# Patient Record
Sex: FEMALE | Race: WHITE | NOT HISPANIC OR LATINO | Employment: FULL TIME | URBAN - METROPOLITAN AREA
[De-identification: names, ages, dates, MRNs, and addresses within clinical notes are randomized per-mention and may not be internally consistent; named-entity substitution may affect disease eponyms.]

---

## 2017-01-25 ENCOUNTER — OFFICE VISIT (OUTPATIENT)
Dept: PEDIATRICS | Facility: CLINIC | Age: 52
End: 2017-01-25
Payer: COMMERCIAL

## 2017-01-25 ENCOUNTER — TELEPHONE (OUTPATIENT)
Dept: PEDIATRICS | Facility: CLINIC | Age: 52
End: 2017-01-25

## 2017-01-25 VITALS
TEMPERATURE: 98.6 F | WEIGHT: 249.9 LBS | OXYGEN SATURATION: 97 % | HEIGHT: 72 IN | DIASTOLIC BLOOD PRESSURE: 82 MMHG | HEART RATE: 82 BPM | BODY MASS INDEX: 33.85 KG/M2 | SYSTOLIC BLOOD PRESSURE: 110 MMHG

## 2017-01-25 DIAGNOSIS — J20.9 BRONCHITIS WITH BRONCHOSPASM: Primary | ICD-10-CM

## 2017-01-25 PROCEDURE — 99203 OFFICE O/P NEW LOW 30 MIN: CPT | Performed by: INTERNAL MEDICINE

## 2017-01-25 RX ORDER — ALBUTEROL SULFATE 90 UG/1
2 AEROSOL, METERED RESPIRATORY (INHALATION) EVERY 4 HOURS PRN
Qty: 1 INHALER | Refills: 0 | Status: SHIPPED | OUTPATIENT
Start: 2017-01-25 | End: 2017-03-01

## 2017-01-25 RX ORDER — FAMOTIDINE 20 MG
1 TABLET ORAL 2 TIMES DAILY
COMMUNITY

## 2017-01-25 RX ORDER — AMOXICILLIN 500 MG
3 CAPSULE ORAL DAILY
COMMUNITY

## 2017-01-25 NOTE — TELEPHONE ENCOUNTER
Type of outreach:  talked with patient  Health Maintenance Due   Topic Date Due     TETANUS IMMUNIZATION (SYSTEM ASSIGNED)  01/26/1983     HEPATITIS C SCREENING  01/26/1983     PAP SCREENING Q3 YR (SYSTEM ASSIGNED)  01/26/1986     MAMMO SCREEN Q2 YR (SYSTEM ASSIGNED)  01/26/2005     LIPID SCREEN Q5 YR FEMALE (SYSTEM ASSIGNED)  01/26/2010     COLON CANCER SCREEN (SYSTEM ASSIGNED)  01/26/2015     INFLUENZA VACCINE (SYSTEM ASSIGNED)  09/01/2016     Pap and mammo done 12/2015  Will schedule another appt to complete other HM    Ale Nguyen LPN

## 2017-01-25 NOTE — MR AVS SNAPSHOT
"              After Visit Summary   1/25/2017    Alison Chandler    MRN: [id number]           Patient Information     Date Of Birth          1965        Visit Information        Provider Department      1/25/2017 11:10 AM Nia Arshad MD [facility]        Care Instructions    Try mucinex 600-1200 mg twice daily.  You can try the albuterol inhaler 2 puffs every 4 hours if needed, or 4 times a day until you are feeling better.  Let me know by Monday if you aren't doing better and we can do an antibiotic.  Try to be off work until Monday and take it easy.  Keep your fluid intake up.  If at any point you are getting worse, fevers, short of breath, call or come in right away.    We should meet for a medical physical.        Follow-ups after your visit        Who to contact     If you have questions or need follow up information about today's clinic visit or your schedule please contact [facility] directly at [phone].  Normal or non-critical lab and imaging results will be communicated to you by [employer], letter or phone within 4 business days after the clinic has received the results. If you do not hear from us within 7 days, please contact the clinic through [employer] or phone. If you have a critical or abnormal lab result, we will notify you by phone as soon as possible.  Submit refill requests through [employer] or call your pharmacy and they will forward the refill request to us. Please allow 3 business days for your refill to be completed.          Additional Information About Your Visit        [employer] Information     [employer] lets you send messages to your doctor, view your test results, renew your prescriptions, schedule appointments and more. To sign up, go to [email or web address] . Click on \"Log in\" on the left side of the screen, which will take you to the Welcome page. Then click on \"Sign up Now\" on the right side of the page.     You will be asked to enter the " access code listed below, as well as some personal information. Please follow the directions to create your username and password.     Your access code is: [email or web address]  Expires: 2017 12:19 PM     Your access code will  in 90 days. If you need help or a new code, please call your [facility] clinic or [phone].        Care EveryWhere ID     This is your Care EveryWhere ID. This could be used by other organizations to access your [facility] medical records  [id number]        Your Vitals Were     Pulse Temperature Height BMI (Body Mass Index) Pulse Oximetry       82 98.6  F (37  C) (Oral) 6' (1.829 m) 33.89 kg/m2 97%        Blood Pressure from Last 3 Encounters:   17 110/82    Weight from Last 3 Encounters:   17 249 lb 14.4 oz (113.354 kg)              Today, you had the following     No orders found for display       Primary Care Provider    None Specified       No primary provider on file.        Thank you!     Thank you for choosing [facility]  for your care. Our goal is always to provide you with excellent care. Hearing back from our patients is one way we can continue to improve our services. Please take a few minutes to complete the written survey that you may receive in the mail after your visit with us. Thank you!             Your Updated Medication List - Protect others around you: Learn how to safely use, store and throw away your medicines at www.disposemymeds.org.          This list is accurate as of: 17 12:19 PM.  Always use your most recent med list.                   Brand Name Dispense Instructions for use    CALCIUM 1200 PO      Take 1 capsule by mouth daily       Fish Oil 1200 MG Caps      Take 1 capsule by mouth daily       FLUoxetine 20 MG capsule    PROzac     Take 40 mg by mouth daily       MULTIVITAMIN ADULT PO      Take by mouth daily       Vitamin D (Cholecalciferol) 1000 UNITS Caps      Take 1 tablet by mouth 2 times daily

## 2017-01-25 NOTE — PATIENT INSTRUCTIONS
Try mucinex 600-1200 mg twice daily.  You can try the albuterol inhaler 2 puffs every 4 hours if needed, or 4 times a day until you are feeling better.  Let me know by Monday if you aren't doing better and we can do an antibiotic.  Try to be off work until Monday and take it easy.  Keep your fluid intake up.  If at any point you are getting worse, fevers, short of breath, call or come in right away.    We should meet for a medical physical.

## 2017-01-25 NOTE — PROGRESS NOTES
SUBJECTIVE:                                                    Alison Chandler is a 51 year old female who presents to clinic today for the following health issues:      Acute Illness   Acute illness concerns: cough  Onset: 5-6 days     Fever: no    Chills/Sweats: YES    Headache (location?): YES    Sinus Pressure:YES    Conjunctivitis:  no    Ear Pain: YES- pressure    Rhinorrhea: YES    Congestion: YES    Sore Throat: YES     Cough: YES-productive of yellow sputum    Wheeze: YES    Decreased Appetite: no    Nausea: no    Vomiting: no    Diarrhea:  no    Dysuria/Freq.: no    Fatigue/Achiness: YES    Sick/Strep Exposure: no     Therapies Tried and outcome: ibuprofen and sudafed    Is worried about pneumonia. Feels normally she wouldn't come in for a cold, but coughing up lots of phlegm that is yellow. This is unusual for her.     Started 5 days ago as just feeling run down. Cough started 2 days ago and is worsening steadily. Slept 6 hours last night, but coughed a lot this morning.     Problem list and histories reviewed & adjusted, as indicated.  Additional history: as documented    Problem list, Medication list, Allergies, and Medical/Social/Surgical histories reviewed in [facility] and updated as appropriate.    ROS:  Constitutional, HEENT, cardiovascular, pulmonary, GI,  systems are negative, except as otherwise noted.    OBJECTIVE:                                                    /82 mmHg  Pulse 82  Temp(Src) 98.6  F (37  C) (Oral)  Ht 6' (1.829 m)  Wt 249 lb 14.4 oz (113.354 kg)  BMI 33.89 kg/m2  SpO2 97%  Body mass index is 33.89 kg/(m^2).  GENERAL: healthy, alert and no distress  EYES: Eyes grossly normal to inspection, PERRL and conjunctivae and sclerae normal  HENT: ear canals and TM's normal, nose and mouth without ulcers or lesions  NECK: no adenopathy, no asymmetry, masses, or scars and thyroid normal to palpation  RESP: bilateral expiratory wheeze with good air entry.  CV: regular  rate and rhythm, normal S1 S2, no S3 or S4, no murmur, click or rub, no peripheral edema and peripheral pulses strong  ABDOMEN: soft, nontender, no hepatosplenomegaly, no masses and bowel sounds normal    Diagnostic Test Results:  none      ASSESSMENT/PLAN:                                                      1. Bronchitis with bronchospasm  Often gets significant cough with URI. Discussed antibiotic use. At this point, is most likely viral. Plan albuterol prn. See patient instructions section.  - albuterol (PROAIR HFA/PROVENTIL HFA/VENTOLIN HFA) 108 (90 BASE) MCG/ACT Inhaler; Inhale 2 puffs into the lungs every 4 hours as needed for shortness of breath / dyspnea or wheezing  Dispense: 1 Inhaler; Refill: 0    Patient Instructions   Try mucinex 600-1200 mg twice daily.  You can try the albuterol inhaler 2 puffs every 4 hours if needed, or 4 times a day until you are feeling better.  Let me know by Monday if you aren't doing better and we can do an antibiotic.  Try to be off work until Monday and take it easy.  Keep your fluid intake up.  If at any point you are getting worse, fevers, short of breath, call or come in right away.    We should meet for a medical physical.        Nia Condon MD  [facility]

## 2017-01-25 NOTE — NURSING NOTE
Chief Complaint   Patient presents with     URI       Initial /82 mmHg  Pulse 82  Temp(Src) 98.6  F (37  C) (Oral)  Ht 6' (1.829 m)  Wt 249 lb 14.4 oz (113.354 kg)  BMI 33.89 kg/m2  SpO2 97% Estimated body mass index is 33.89 kg/(m^2) as calculated from the following:    Height as of this encounter: 6' (1.829 m).    Weight as of this encounter: 249 lb 14.4 oz (113.354 kg).  BP completed using cuff size: benita Nguyen LPN

## 2017-01-29 ENCOUNTER — MYC MEDICAL ADVICE (OUTPATIENT)
Dept: PEDIATRICS | Facility: CLINIC | Age: 52
End: 2017-01-29

## 2017-02-10 ENCOUNTER — TRANSFERRED RECORDS (OUTPATIENT)
Dept: HEALTH INFORMATION MANAGEMENT | Facility: CLINIC | Age: 52
End: 2017-02-10

## 2017-02-28 NOTE — PROGRESS NOTES
SUBJECTIVE:     CC: Alison Chandler is an 52 year old woman who presents for preventive health visit.     Physical   Annual:     Getting at least 3 servings of Calcium per day::  Yes    Bi-annual eye exam::  Yes    Dental care twice a year::  Yes    Sleep apnea or symptoms of sleep apnea::  None    Diet::  Low fat/cholesterol, Diabetic and Carbohydrate counting    Frequency of exercise::  None    Taking medications regularly::  Yes    Medication side effects::  None, No muscle aches and No significant flushing    Additional concerns today::  YES        Vascular vein issue. Was going to the vein center at [facility]. Has all of her records and would like a referral for further sclerotherapy.     Today's PHQ-2 Score:   PHQ-2 ( 1999 [employer]) 2/24/2017   Q1: Little interest or pleasure in doing things -   Q2: Feeling down, depressed or hopeless -   PHQ-2 Score -   Little interest or pleasure in doing things Not at all   Feeling down, depressed or hopeless Not at all   PHQ-2 Score 0       Abuse: Current or Past(Physical, Sexual or Emotional)- No  Do you feel safe in your environment - Yes    Social History   Substance Use Topics     Smoking status: Never Smoker     Smokeless tobacco: Not on file     Alcohol use Yes     The patient does not drink >3 drinks per day nor >7 drinks per week.    No results for input(s): CHOL, HDL, LDL, TRIG, CHOLHDLRATIO, NHDL in the last 79911 hours.    Reviewed orders with patient.  Reviewed health maintenance and updated orders accordingly - Yes    Mammo Decision Support:  Patient over age 50, mutual decision to screen reflected in health maintenance.    Pertinent mammograms are reviewed under the imaging tab.  History of abnormal Pap smear: NO - age 30- 65 PAP every 3 years recommended  Last 3 Pap Results: No results found for: PAP    Reviewed and updated as needed this visit by clinical staff  Tobacco  Allergies  Meds         Reviewed and updated as needed this visit by  "Provider            ROS:  C: NEGATIVE for fever, chills, change in weight  I: NEGATIVE for worrisome rashes, moles or lesions  E: NEGATIVE for vision changes or irritation  ENT: NEGATIVE for ear, mouth and throat problems  R: NEGATIVE for significant cough or SOB  B: NEGATIVE for masses, tenderness or discharge  CV: NEGATIVE for chest pain, palpitations or peripheral edema  GI: NEGATIVE for nausea, abdominal pain, heartburn, or change in bowel habits  : NEGATIVE for unusual urinary or vaginal symptoms. Periods are regular.  M: NEGATIVE for significant arthralgias or myalgia  N: NEGATIVE for weakness, dizziness or paresthesias  P: NEGATIVE for changes in mood or affect    Problem list, Medication list, Allergies, and Medical/Social/Surgical histories reviewed in [facility] and updated as appropriate.  OBJECTIVE:     /70  Pulse 64  Temp 98.4  F (36.9  C) (Oral)  Ht 5' 11.65\" (1.82 m)  Wt 253 lb 6.4 oz (114.9 kg)  LMP 02/17/2017  BMI 34.7 kg/m2  EXAM:  GENERAL: healthy, alert and no distress  EYES: Eyes grossly normal to inspection, PERRL and conjunctivae and sclerae normal  HENT: ear canals and TM's normal, nose and mouth without ulcers or lesions  NECK: no adenopathy, no asymmetry, masses, or scars and thyroid normal to palpation  RESP: lungs clear to auscultation - no rales, rhonchi or wheezes  BREAST: normal without masses, tenderness or nipple discharge and no palpable axillary masses or adenopathy  CV: regular rate and rhythm, normal S1 S2, no S3 or S4, no murmur, click or rub, no peripheral edema and peripheral pulses strong  ABDOMEN: soft, nontender, no hepatosplenomegaly, no masses and bowel sounds normal  MS: no gross musculoskeletal defects noted, no edema  SKIN: no suspicious lesions or rashes  NEURO: Normal strength and tone, mentation intact and speech normal  PSYCH: mentation appears normal, affect normal/bright    ASSESSMENT/PLAN:     1. Routine general medical examination at a [facility]" "facility      2. Symptomatic varicose veins of both lower extremities  Referral done  - VASCULAR SURGERY REFERRAL    3. Bone spur of foot    - PHYSICAL THERAPY REFERRAL    4. CARDIOVASCULAR SCREENING; LDL GOAL LESS THAN 160    - **Lipid panel reflex to direct LDL FUTURE 2mo  - Comprehensive metabolic panel  - TSH with free T4 reflex    COUNSELING:  Reviewed preventive health counseling, as reflected in patient instructions         reports that she has never smoked. She does not have any smokeless tobacco history on file.    Estimated body mass index is 34.7 kg/(m^2) as calculated from the following:    Height as of this encounter: 5' 11.65\" (1.82 m).    Weight as of this encounter: 253 lb 6.4 oz (114.9 kg).   Weight management plan: Discussed healthy diet and exercise guidelines and patient will follow up in 12 months in clinic to re-evaluate.    Counseling Resources:  ATP IV Guidelines  Pooled Cohorts Equation Calculator  Breast Cancer Risk Calculator  FRAX Risk Assessment  ICSI Preventive Guidelines  Dietary Guidelines for Americans, 2010  USDA's MyPlate  ASA Prophylaxis  Lung CA Screening    Nia Condon MD  [facility]  "

## 2017-03-01 ENCOUNTER — OFFICE VISIT (OUTPATIENT)
Dept: PEDIATRICS | Facility: CLINIC | Age: 52
End: 2017-03-01
Payer: COMMERCIAL

## 2017-03-01 VITALS
SYSTOLIC BLOOD PRESSURE: 100 MMHG | HEART RATE: 64 BPM | TEMPERATURE: 98.4 F | HEIGHT: 72 IN | DIASTOLIC BLOOD PRESSURE: 70 MMHG | WEIGHT: 253.4 LBS | BODY MASS INDEX: 34.32 KG/M2

## 2017-03-01 DIAGNOSIS — I83.893 SYMPTOMATIC VARICOSE VEINS OF BOTH LOWER EXTREMITIES: ICD-10-CM

## 2017-03-01 DIAGNOSIS — Z13.6 CARDIOVASCULAR SCREENING; LDL GOAL LESS THAN 160: ICD-10-CM

## 2017-03-01 DIAGNOSIS — M77.50 BONE SPUR OF FOOT: ICD-10-CM

## 2017-03-01 DIAGNOSIS — Z00.00 ROUTINE GENERAL MEDICAL EXAMINATION AT A HEALTH CARE FACILITY: Primary | ICD-10-CM

## 2017-03-01 LAB
ALBUMIN SERPL-MCNC: 3.9 G/DL (ref 3.4–5)
ALP SERPL-CCNC: 58 U/L (ref 40–150)
ALT SERPL W P-5'-P-CCNC: 36 U/L (ref 0–50)
ANION GAP SERPL CALCULATED.3IONS-SCNC: 6 MMOL/L (ref 3–14)
AST SERPL W P-5'-P-CCNC: 17 U/L (ref 0–45)
BILIRUB SERPL-MCNC: 0.5 MG/DL (ref 0.2–1.3)
BUN SERPL-MCNC: 16 MG/DL (ref 7–30)
CALCIUM SERPL-MCNC: 8.6 MG/DL (ref 8.5–10.1)
CHLORIDE SERPL-SCNC: 106 MMOL/L (ref 94–109)
CHOLEST SERPL-MCNC: 155 MG/DL
CO2 SERPL-SCNC: 30 MMOL/L (ref 20–32)
CREAT SERPL-MCNC: 0.75 MG/DL (ref 0.52–1.04)
GFR SERPL CREATININE-BSD FRML MDRD: 81 ML/MIN/1.7M2
GLUCOSE SERPL-MCNC: 99 MG/DL (ref 70–99)
HDLC SERPL-MCNC: 57 MG/DL
LDLC SERPL CALC-MCNC: 79 MG/DL
NONHDLC SERPL-MCNC: 98 MG/DL
POTASSIUM SERPL-SCNC: 4.1 MMOL/L (ref 3.4–5.3)
PROT SERPL-MCNC: 7.1 G/DL (ref 6.8–8.8)
SODIUM SERPL-SCNC: 142 MMOL/L (ref 133–144)
TRIGL SERPL-MCNC: 94 MG/DL
TSH SERPL DL<=0.005 MIU/L-ACNC: 2 MU/L (ref 0.4–4)

## 2017-03-01 PROCEDURE — 80061 LIPID PANEL: CPT | Performed by: INTERNAL MEDICINE

## 2017-03-01 PROCEDURE — 84443 ASSAY THYROID STIM HORMONE: CPT | Performed by: INTERNAL MEDICINE

## 2017-03-01 PROCEDURE — 99396 PREV VISIT EST AGE 40-64: CPT | Performed by: INTERNAL MEDICINE

## 2017-03-01 PROCEDURE — 80053 COMPREHEN METABOLIC PANEL: CPT | Performed by: INTERNAL MEDICINE

## 2017-03-01 PROCEDURE — 36415 COLL VENOUS BLD VENIPUNCTURE: CPT | Performed by: INTERNAL MEDICINE

## 2017-03-01 NOTE — PATIENT INSTRUCTIONS
Preventive Health Recommendations  Female Ages 50 - 64    Yearly exam: See your health care provider every year in order to  o Review health changes.   o Discuss preventive care.    o Review your medicines if your doctor has prescribed any.      Get a Pap test every three years (unless you have an abnormal result and your provider advises testing more often).    If you get Pap tests with HPV test, you only need to test every 5 years, unless you have an abnormal result.     You do not need a Pap test if your uterus was removed (hysterectomy) and you have not had cancer.    You should be tested each year for STDs (sexually transmitted diseases) if you're at risk.     Have a mammogram every 1 to 2 years.    Have a colonoscopy at age 50, or have a yearly FIT test (stool test). These exams screen for colon cancer.      Have a cholesterol test every 5 years, or more often if advised.    Have a diabetes test (fasting glucose) every three years. If you are at risk for diabetes, you should have this test more often.     If you are at risk for osteoporosis (brittle bone disease), think about having a bone density scan (DEXA).    Shots: Get a flu shot each year. Get a tetanus shot every 10 years.    Nutrition:     Eat at least 5 servings of fruits and vegetables each day.    Eat whole-grain bread, whole-wheat pasta and brown rice instead of white grains and rice.    Talk to your provider about Calcium and Vitamin D.     Lifestyle    Exercise at least 150 minutes a week (30 minutes a day, 5 days a week). This will help you control your weight and prevent disease.    Limit alcohol to one drink per day.    No smoking.     Wear sunscreen to prevent skin cancer.     See your dentist every six months for an exam and cleaning.    See your eye doctor every 1 to 2 years.    Check with your insurance to see where you would like to go.    Put some time into thinking about exercise.  Call [facility] [phone] physical  therapy department to set up an appointment for pool therapy.  Come see me im 3-6 months if you feel like you are not moving forward with weight loss goals.

## 2017-03-01 NOTE — MR AVS SNAPSHOT
After Visit Summary   3/1/2017    Alison Chandler    MRN: [id number]           Patient Information     Date Of Birth          1965        Visit Information        Provider Department      3/1/2017 8:10 AM Nia Arshad MD [facility]        Today's Diagnoses     Routine general medical examination at a health care facility    -  1    Symptomatic varicose veins of both lower extremities        Bone spur of foot        CARDIOVASCULAR SCREENING; LDL GOAL LESS THAN 160          Care Instructions      Preventive Health Recommendations  Female Ages 50 - 64    Yearly exam: See your health care provider every year in order to  o Review health changes.   o Discuss preventive care.    o Review your medicines if your doctor has prescribed any.      Get a Pap test every three years (unless you have an abnormal result and your provider advises testing more often).    If you get Pap tests with HPV test, you only need to test every 5 years, unless you have an abnormal result.     You do not need a Pap test if your uterus was removed (hysterectomy) and you have not had cancer.    You should be tested each year for STDs (sexually transmitted diseases) if you're at risk.     Have a mammogram every 1 to 2 years.    Have a colonoscopy at age 50, or have a yearly FIT test (stool test). These exams screen for colon cancer.      Have a cholesterol test every 5 years, or more often if advised.    Have a diabetes test (fasting glucose) every three years. If you are at risk for diabetes, you should have this test more often.     If you are at risk for osteoporosis (brittle bone disease), think about having a bone density scan (DEXA).    Shots: Get a flu shot each year. Get a tetanus shot every 10 years.    Nutrition:     Eat at least 5 servings of fruits and vegetables each day.    Eat whole-grain bread, whole-wheat pasta and brown rice instead of white grains and rice.    Talk to your provider about  Calcium and Vitamin D.     Lifestyle    Exercise at least 150 minutes a week (30 minutes a day, 5 days a week). This will help you control your weight and prevent disease.    Limit alcohol to one drink per day.    No smoking.     Wear sunscreen to prevent skin cancer.     See your dentist every six months for an exam and cleaning.    See your eye doctor every 1 to 2 years.    Check with your insurance to see where you would like to go.    Put some time into thinking about exercise.  Call [facility] [phone] physical therapy department to set up an appointment for pool therapy.  Come see me im 3-6 months if you feel like you are not moving forward with weight loss goals.          Follow-ups after your visit        Additional Services     PHYSICAL THERAPY REFERRAL       *This therapy referral will be filtered to a centralized scheduling office at [facility] and the patient will receive a call to schedule an appointment at a [place] location most convenient for them. *     [facility] provides Physical Therapy evaluation and treatment and many specialty services across the [place] system.  If requesting a specialty program, please choose from the list below.    If you have not heard from the scheduling office within 2 business days, please call [phone] for all locations, with the exception of [place], please call [phone].  Treatment: Evaluation & Treatment  Special Instructions/Modalities:   Special Programs: Aquatic Therapy ([place], [place] and [place] locations only)    Please be aware that coverage of these services is subject to the terms and limitations of your health insurance plan.  Call member services at your health plan with any benefit or coverage questions.      **Note to Provider:  If you are referring outside of [place] for the therapy appointment, please list the name of the location in the  special instructions  above, print the  referral and give to the patient to schedule the appointment.            VASCULAR SURGERY REFERRAL       Your provider has referred you to: [facility] [place], [place]: [facility] - [place] - [phone] or Vascular  Services - Varicose Veins & None - Please Order Appropriate Testing   [email or web address]    Please be aware that coverage of these services is subject to the terms and limitations of your health insurance plan.  Call member services at your health plan with any benefit or coverage questions.      Please bring the following with you to your appointment:    (1) Any X-Rays, CTs or MRIs which have been performed.  Contact the facility where they were done to arrange for  prior to your scheduled appointment.    (2) List of current medications   (3) This referral request   (4) Any documents/labs given to you for this referral                  Who to contact     If you have questions or need follow up information about today's clinic visit or your schedule please contact [facility] directly at [phone].  Normal or non-critical lab and imaging results will be communicated to you by MyChart, letter or phone within 4 business days after the clinic has received the results. If you do not hear from us within 7 days, please contact the clinic through [address] or phone. If you have a critical or abnormal lab result, we will notify you by phone as soon as possible.  Submit refill requests through [employer] or call your pharmacy and they will forward the refill request to us. Please allow 3 business days for your refill to be completed.          Additional Information About Your Visit        [employer] Information     [employer] gives you secure access to your electronic health record. If you see a primary care provider, you can also send messages to your care team and make appointments. If you have questions, please call your primary care clinic.  If you do not have a primary care  "provider, please call [phone] and they will assist you.        Care EveryWhere ID     This is your Care EveryWhere ID. This could be used by other organizations to access your [place] medical records  [id number]        Your Vitals Were     Pulse Temperature Height Last Period BMI (Body Mass Index)       64 98.4  F (36.9  C) (Oral) 5' 11.65\" (1.82 m) 02/17/2017 34.7 kg/m2        Blood Pressure from Last 3 Encounters:   03/01/17 100/70   01/25/17 110/82    Weight from Last 3 Encounters:   03/01/17 253 lb 6.4 oz (114.9 kg)   01/25/17 249 lb 14.4 oz (113.4 kg)              We Performed the Following     **Lipid panel reflex to direct LDL FUTURE 2mo     Comprehensive metabolic panel     PHYSICAL THERAPY REFERRAL     TSH with free T4 reflex     VASCULAR SURGERY REFERRAL          Today's Medication Changes          These changes are accurate as of: 3/1/17  9:13 AM.  If you have any questions, ask your nurse or doctor.               Stop taking these medicines if you haven't already. Please contact your care team if you have questions.     albuterol 108 (90 BASE) MCG/ACT Inhaler   Commonly known as:  PROAIR HFA/PROVENTIL HFA/VENTOLIN HFA   Stopped by:  Nia Arshad MD                    Primary Care Provider Office Phone # Fax #    Nia Arshad [phone]       [address] [place] [address]        Thank you!     Thank you for choosing [facility]  for your care. Our goal is always to provide you with excellent care. Hearing back from our patients is one way we can continue to improve our services. Please take a few minutes to complete the written survey that you may receive in the mail after your visit with us. Thank you!             Your Updated Medication List - Protect others around you: Learn how to safely use, store and throw away your medicines at www.disposemymeds.org.          This list is accurate as of: 3/1/17  9:13 AM.  Always " use your most recent med list.                   Brand Name Dispense Instructions for use    Fish Oil 1200 MG Caps      Take 1 capsule by mouth daily       FLUoxetine 20 MG capsule    PROzac     Take 40 mg by mouth daily       MULTIVITAMIN ADULT PO      Take by mouth daily       UNABLE TO FIND      MEDICATION NAME: calcium 100 mg daily       Vitamin D (Cholecalciferol) 1000 UNITS Caps      Take 1 tablet by mouth 2 times daily

## 2017-03-01 NOTE — NURSING NOTE
"Chief Complaint   Patient presents with     Physical       Initial /70  Pulse 64  Temp 98.4  F (36.9  C) (Oral)  Ht 5' 11.65\" (1.82 m)  Wt 253 lb 6.4 oz (114.9 kg)  LMP 02/17/2017  BMI 34.7 kg/m2 Estimated body mass index is 34.7 kg/(m^2) as calculated from the following:    Height as of this encounter: 5' 11.65\" (1.82 m).    Weight as of this encounter: 253 lb 6.4 oz (114.9 kg).  Medication Reconciliation: complete   Ale Nguyen LPN    "

## 2017-04-07 ENCOUNTER — APPOINTMENT (OUTPATIENT)
Dept: VASCULAR SURGERY | Facility: CLINIC | Age: 52
End: 2017-04-07
Payer: COMMERCIAL

## 2017-04-07 PROCEDURE — 99204 OFFICE O/P NEW MOD 45 MIN: CPT | Performed by: SURGERY

## 2017-04-21 ENCOUNTER — OFFICE VISIT (OUTPATIENT)
Dept: VASCULAR SURGERY | Facility: CLINIC | Age: 52
End: 2017-04-21
Payer: COMMERCIAL

## 2017-04-21 ENCOUNTER — APPOINTMENT (OUTPATIENT)
Dept: VASCULAR SURGERY | Facility: CLINIC | Age: 52
End: 2017-04-21
Payer: COMMERCIAL

## 2017-04-21 PROCEDURE — 99212 OFFICE O/P EST SF 10 MIN: CPT | Performed by: SURGERY

## 2017-04-21 PROCEDURE — 93970 EXTREMITY STUDY: CPT | Performed by: SURGERY

## 2017-08-07 ENCOUNTER — TELEPHONE (OUTPATIENT)
Dept: PEDIATRICS | Facility: CLINIC | Age: 52
End: 2017-08-07

## 2017-08-07 NOTE — TELEPHONE ENCOUNTER
8/7/2017      Patient state that she has completed Colon & Pap and does not need to do them. She had submit an transfer form to have that info sent over to [place], but it seems like it never came through.             Outreach ,  Mitchel Guerrero

## 2017-09-06 ENCOUNTER — MYC MEDICAL ADVICE (OUTPATIENT)
Dept: PEDIATRICS | Facility: CLINIC | Age: 52
End: 2017-09-06

## 2017-09-06 DIAGNOSIS — L98.9 FACIAL LESION: Primary | ICD-10-CM

## 2017-09-06 NOTE — TELEPHONE ENCOUNTER
Call to patient to see if she has a preference in a dermatologist.  Unable to reach. My-chart sent to patient asking if she has a referral preference.  Ilsa Shultz RN  Message handled by Nurse Triage.      .

## 2017-10-13 ENCOUNTER — OFFICE VISIT (OUTPATIENT)
Dept: DERMATOLOGY | Facility: CLINIC | Age: 52
End: 2017-10-13
Payer: COMMERCIAL

## 2017-10-13 VITALS — HEART RATE: 68 BPM | OXYGEN SATURATION: 97 % | SYSTOLIC BLOOD PRESSURE: 123 MMHG | DIASTOLIC BLOOD PRESSURE: 79 MMHG

## 2017-10-13 DIAGNOSIS — D18.00 ANGIOMA: ICD-10-CM

## 2017-10-13 DIAGNOSIS — D22.9 NEVUS: Primary | ICD-10-CM

## 2017-10-13 DIAGNOSIS — L82.1 SEBORRHEIC KERATOSIS: ICD-10-CM

## 2017-10-13 DIAGNOSIS — L81.4 LENTIGO: ICD-10-CM

## 2017-10-13 PROCEDURE — 99203 OFFICE O/P NEW LOW 30 MIN: CPT | Performed by: PHYSICIAN ASSISTANT
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
## 2017-10-13 NOTE — MR AVS SNAPSHOT
After Visit Summary   10/13/2017    Alison Chandler    MRN: [id number]           Patient Information     Date Of Birth          1965        Visit Information        Provider Department      10/13/2017 8:45 AM Daina Quijano PA-C [facility]        Today's Diagnoses     Nevus    -  1    Lentigo        Angioma        Seborrheic keratosis           Follow-ups after your visit        Who to contact     If you have questions or need follow up information about today's clinic visit or your schedule please contact [facility] directly at [phone].  Normal or non-critical lab and imaging results will be communicated to you by MyChart, letter or phone within 4 business days after the clinic has received the results. If you do not hear from us within 7 days, please contact the clinic through [employer] or phone. If you have a critical or abnormal lab result, we will notify you by phone as soon as possible.  Submit refill requests through [employer] or call your pharmacy and they will forward the refill request to us. Please allow 3 business days for your refill to be completed.          Additional Information About Your Visit        MyChart Information     [employer] gives you secure access to your electronic health record. If you see a primary care provider, you can also send messages to your care team and make appointments. If you have questions, please call your primary care clinic.  If you do not have a primary care provider, please call [phone] and they will assist you.        Care EveryWhere ID     This is your Care EveryWhere ID. This could be used by other organizations to access your [place] medical records  [id number]        Your Vitals Were     Pulse Pulse Oximetry                68 97%           Blood Pressure from Last 3 Encounters:   10/13/17 123/79   03/01/17 100/70   01/25/17 110/82    Weight from Last 3 Encounters:   03/01/17  114.9 kg (253 lb 6.4 oz)   01/25/17 113.4 kg (249 lb 14.4 oz)              Today, you had the following     No orders found for display       Primary Care Provider Office Phone # Fax #    Nia Arshad [phone] [address] [place] [address]        Equal Access to Services     : Hadii aad ku hadasho Soomaali, waaxda luqadaha, qaybta kaalmada adeegyada, waxay idiin hayaan adeeg kharash la'aan . So [facility] [phone].    ATENCIÓN: Si habla español, tiene a frias disposición servicios gratuitos de asistencia lingüística. Llame al [phone].    We comply with applicable federal civil rights laws and [place] laws. We do not discriminate on the basis of race, color, national origin, age, disability, sex, sexual orientation, or gender identity.            Thank you!     Thank you for choosing [facility]  for your care. Our goal is always to provide you with excellent care. Hearing back from our patients is one way we can continue to improve our services. Please take a few minutes to complete the written survey that you may receive in the mail after your visit with us. Thank you!             Your Updated Medication List - Protect others around you: Learn how to safely use, store and throw away your medicines at www.disposemymeds.org.          This list is accurate as of: 10/13/17  9:16 AM.  Always use your most recent med list.                   Brand Name Dispense Instructions for use Diagnosis    fish Oil 1200 MG capsule      Take 1 capsule by mouth daily        FLUoxetine 20 MG capsule    PROzac     Take 40 mg by mouth daily        MULTIVITAMIN ADULT PO      Take by mouth daily        UNABLE TO FIND      MEDICATION NAME: calcium 100 mg daily        Vitamin D (Cholecalciferol) 1000 UNITS Caps      Take 1 tablet by mouth 2 times daily

## 2017-10-13 NOTE — NURSING NOTE
"Chief Complaint   Patient presents with     Skin Check       Initial /79  Pulse 68  SpO2 97% Estimated body mass index is 34.7 kg/(m^2) as calculated from the following:    Height as of 3/1/17: 1.82 m (5' 11.65\").    Weight as of 3/1/17: 114.9 kg (253 lb 6.4 oz).      Patient prefers to be contacted via at Home.   Okay to leave detailed message: Yes  Patient uses MyChart: Yes    Yuliya PHILLIPS LPN    "

## 2018-05-06 ASSESSMENT — ENCOUNTER SYMPTOMS
MYALGIAS: 0
CHILLS: 0
PARESTHESIAS: 0
ARTHRALGIAS: 0
WEAKNESS: 0
HEMATURIA: 0
HEMATOCHEZIA: 0
FEVER: 0
EYE PAIN: 0
HEADACHES: 0
HEARTBURN: 0
CONSTIPATION: 0
NAUSEA: 0
ABDOMINAL PAIN: 0
JOINT SWELLING: 0
DYSURIA: 0
COUGH: 0
NERVOUS/ANXIOUS: 0
DIZZINESS: 0
FREQUENCY: 0
SHORTNESS OF BREATH: 0
SORE THROAT: 0
PALPITATIONS: 0
DIARRHEA: 0

## 2018-05-07 ENCOUNTER — OFFICE VISIT (OUTPATIENT)
Dept: PEDIATRICS | Facility: CLINIC | Age: 53
End: 2018-05-07
Payer: COMMERCIAL

## 2018-05-07 VITALS
OXYGEN SATURATION: 98 % | WEIGHT: 245.9 LBS | BODY MASS INDEX: 34.43 KG/M2 | HEART RATE: 64 BPM | TEMPERATURE: 98.7 F | HEIGHT: 71 IN | SYSTOLIC BLOOD PRESSURE: 118 MMHG | DIASTOLIC BLOOD PRESSURE: 76 MMHG

## 2018-05-07 DIAGNOSIS — Z00.00 ENCOUNTER FOR ROUTINE ADULT HEALTH EXAMINATION WITHOUT ABNORMAL FINDINGS: Primary | ICD-10-CM

## 2018-05-07 DIAGNOSIS — Z11.59 NEED FOR HEPATITIS C SCREENING TEST: ICD-10-CM

## 2018-05-07 DIAGNOSIS — F33.41 RECURRENT MAJOR DEPRESSIVE DISORDER, IN PARTIAL REMISSION (H): ICD-10-CM

## 2018-05-07 PROBLEM — F33.42 RECURRENT MAJOR DEPRESSIVE DISORDER, IN FULL REMISSION (H): Status: ACTIVE | Noted: 2018-05-07

## 2018-05-07 LAB — HBA1C MFR BLD: 5.6 % (ref 0–5.6)

## 2018-05-07 PROCEDURE — 83036 HEMOGLOBIN GLYCOSYLATED A1C: CPT | Performed by: INTERNAL MEDICINE

## 2018-05-07 PROCEDURE — 99396 PREV VISIT EST AGE 40-64: CPT | Performed by: INTERNAL MEDICINE

## 2018-05-07 PROCEDURE — 36415 COLL VENOUS BLD VENIPUNCTURE: CPT | Performed by: INTERNAL MEDICINE

## 2018-05-07 PROCEDURE — 86803 HEPATITIS C AB TEST: CPT | Performed by: INTERNAL MEDICINE

## 2018-05-07 RX ORDER — FLUOXETINE 40 MG/1
40 CAPSULE ORAL DAILY
Qty: 90 CAPSULE | Refills: 1 | Status: SHIPPED | OUTPATIENT
Start: 2018-05-07 | End: 2018-11-06 | Stop reason: DRUGHIGH

## 2018-05-07 ASSESSMENT — ANXIETY QUESTIONNAIRES
7. FEELING AFRAID AS IF SOMETHING AWFUL MIGHT HAPPEN: NOT AT ALL
GAD7 TOTAL SCORE: 0
6. BECOMING EASILY ANNOYED OR IRRITABLE: NOT AT ALL
3. WORRYING TOO MUCH ABOUT DIFFERENT THINGS: NOT AT ALL
1. FEELING NERVOUS, ANXIOUS, OR ON EDGE: NOT AT ALL
IF YOU CHECKED OFF ANY PROBLEMS ON THIS QUESTIONNAIRE, HOW DIFFICULT HAVE THESE PROBLEMS MADE IT FOR YOU TO DO YOUR WORK, TAKE CARE OF THINGS AT HOME, OR GET ALONG WITH OTHER PEOPLE: NOT DIFFICULT AT ALL
2. NOT BEING ABLE TO STOP OR CONTROL WORRYING: NOT AT ALL
5. BEING SO RESTLESS THAT IT IS HARD TO SIT STILL: NOT AT ALL

## 2018-05-07 ASSESSMENT — ENCOUNTER SYMPTOMS
CONSTIPATION: 0
FEVER: 0
PARESTHESIAS: 0
NERVOUS/ANXIOUS: 0
WEAKNESS: 0
SHORTNESS OF BREATH: 0
MYALGIAS: 0
DIARRHEA: 0
ARTHRALGIAS: 0
HEMATURIA: 0
DYSURIA: 0
COUGH: 0
HEADACHES: 0
HEMATOCHEZIA: 0
ABDOMINAL PAIN: 0
EYE PAIN: 0
SORE THROAT: 0
HEARTBURN: 0
DIZZINESS: 0
JOINT SWELLING: 0
FREQUENCY: 0
NAUSEA: 0
PALPITATIONS: 0
CHILLS: 0

## 2018-05-07 ASSESSMENT — PATIENT HEALTH QUESTIONNAIRE - PHQ9: 5. POOR APPETITE OR OVEREATING: NOT AT ALL

## 2018-05-07 NOTE — PROGRESS NOTES
SUBJECTIVE:   CC: Alison Chandler is an 53 year old woman who presents for preventive health visit.     Physical   Annual:     Getting at least 3 servings of Calcium per day::  Yes    Bi-annual eye exam::  Yes    Dental care twice a year::  Yes    Sleep apnea or symptoms of sleep apnea::  None    Diet::  Other    Frequency of exercise::  None    Taking medications regularly::  Yes    Medication side effects::  None    Additional concerns today::  No                Depression Followup    Status since last visit: Stable , on fluoxetine since 2014    See PHQ-9 for current symptoms.  Other associated symptoms: None    Complicating factors:   Significant life event:  No   Current substance abuse:  None  Anxiety or Panic symptoms:  No    No flowsheet data found.  In the past two weeks have you had thoughts of suicide or self-harm?  No.    Do you have concerns about your personal safety or the safety of others?   No  PHQ-9  English  PHQ-9   Any Language  Suicide Assessment Five-step Evaluation and Treatment (SAFE-T)    Today's PHQ-2 Score:   PHQ-2 ( 1999 [employer]) 5/6/2018   Q1: Little interest or pleasure in doing things 0   Q2: Feeling down, depressed or hopeless 0   PHQ-2 Score 0   Q1: Little interest or pleasure in doing things Not at all   Q2: Feeling down, depressed or hopeless Not at all   PHQ-2 Score 0       Abuse: Current or Past(Physical, Sexual or Emotional)- No  Do you feel safe in your environment - Yes    Social History   Substance Use Topics     Smoking status: Never Smoker     Smokeless tobacco: Never Used     Alcohol use Yes     Alcohol Use 5/6/2018   If you drink alcohol do you typically have greater than 3 drinks per day OR greater than 7 drinks per week? No       Reviewed orders with patient.  Reviewed health maintenance and updated orders accordingly - Yes  BP Readings from Last 3 Encounters:   05/07/18 118/76   10/13/17 123/79   03/01/17 100/70    Wt Readings from Last 3 Encounters:   05/07/18  245 lb 14.4 oz (111.5 kg)   03/01/17 253 lb 6.4 oz (114.9 kg)   01/25/17 249 lb 14.4 oz (113.4 kg)                  Patient Active Problem List   Diagnosis     Recurrent major depressive disorder, in partial remission (H)     History reviewed. No pertinent surgical history.    Social History   Substance Use Topics     Smoking status: Never Smoker     Smokeless tobacco: Never Used     Alcohol use Yes     Family History   Problem Relation Age of Onset     DIABETES Mother 50     type 2     Hypertension Mother      Hyperlipidemia Father            Patient over age 50, mutual decision to screen reflected in health maintenance.    Pertinent mammograms are reviewed under the imaging tab.  History of abnormal Pap smear: NO - age 30- 65 PAP every 3 years recommended  Last 3 Pap Results: No results found for: PAP    Reviewed and updated as needed this visit by clinical staff  Tobacco  Allergies  Meds  Med Hx  Surg Hx  Fam Hx  Soc Hx        Reviewed and updated as needed this visit by Provider          Review of Systems   Constitutional: Negative for chills and fever.   HENT: Negative for congestion, ear pain, hearing loss and sore throat.    Eyes: Negative for pain and visual disturbance.   Respiratory: Negative for cough and shortness of breath.    Cardiovascular: Negative for chest pain, palpitations and peripheral edema.   Gastrointestinal: Negative for abdominal pain, constipation, diarrhea, heartburn, hematochezia and nausea.   Genitourinary: Negative for dysuria, frequency, genital sores, hematuria, pelvic pain, urgency, vaginal bleeding and vaginal discharge.   Musculoskeletal: Negative for arthralgias, joint swelling and myalgias.   Skin: Negative for rash.   Neurological: Negative for dizziness, weakness, headaches and paresthesias.   Psychiatric/Behavioral: Negative for mood changes. The patient is not nervous/anxious.         OBJECTIVE:   /76 (Cuff Size: Adult Regular)  Pulse 64  Temp 98.7  F (37.1  " C) (Oral)  Ht 5' 11\" (1.803 m)  Wt 245 lb 14.4 oz (111.5 kg)  LMP 01/18/2018  SpO2 98%  BMI 34.3 kg/m2  Physical Exam  GENERAL: healthy, alert and no distress  EYES: Eyes grossly normal to inspection, PERRL and conjunctivae and sclerae normal  HENT: ear canals and TM's normal, nose and mouth without ulcers or lesions  NECK: no adenopathy, no asymmetry, masses, or scars and thyroid normal to palpation  RESP: lungs clear to auscultation - no rales, rhonchi or wheezes  CV: regular rate and rhythm, normal S1 S2, no S3 or S4, no murmur, click or rub, no peripheral edema and peripheral pulses strong  ABDOMEN: soft, nontender, no hepatosplenomegaly, no masses and bowel sounds normal  MS: no gross musculoskeletal defects noted, no edema  SKIN: no suspicious lesions or rashes  NEURO: Normal strength and tone, mentation intact and speech normal  PSYCH: mentation appears normal, affect normal/bright    ASSESSMENT/PLAN:   1. Encounter for routine adult health examination without abnormal findings  Sees obgyn  - Hemoglobin A1c    2. Recurrent major depressive disorder, in partial remission (H)  Doing well.   - FLUoxetine (PROZAC) 40 MG capsule; Take 1 capsule (40 mg) by mouth daily  Dispense: 90 capsule; Refill: 1    3. Need for hepatitis C screening test    - Hepatitis C Screen Reflex to HCV RNA Quant and Genotype    COUNSELING:  Reviewed preventive health counseling, as reflected in patient instructions         reports that she has never smoked. She has never used smokeless tobacco.    Estimated body mass index is 34.3 kg/(m^2) as calculated from the following:    Height as of this encounter: 5' 11\" (1.803 m).    Weight as of this encounter: 245 lb 14.4 oz (111.5 kg).   Weight management plan: Discussed healthy diet and exercise guidelines and patient will follow up in 12 months in clinic to re-evaluate.    Counseling Resources:  ATP IV Guidelines  Pooled Cohorts Equation Calculator  Breast Cancer Risk Calculator  FRAX " Risk Assessment  ICSI Preventive Guidelines  Dietary Guidelines for Americans, 2010  USDA's MyPlate  ASA Prophylaxis  Lung CA Screening    Nia Arshad MD  [facility]

## 2018-05-07 NOTE — PATIENT INSTRUCTIONS
Preventive Health Recommendations  Female Ages 50 - 64    Yearly exam: See your health care provider every year in order to  o Review health changes.   o Discuss preventive care.    o Review your medicines if your doctor has prescribed any.      Get a Pap test every three years (unless you have an abnormal result and your provider advises testing more often).    If you get Pap tests with HPV test, you only need to test every 5 years, unless you have an abnormal result.     You do not need a Pap test if your uterus was removed (hysterectomy) and you have not had cancer.    You should be tested each year for STDs (sexually transmitted diseases) if you're at risk.     Have a mammogram every 1 to 2 years.    Have a colonoscopy at age 50, or have a yearly FIT test (stool test). These exams screen for colon cancer.      Have a cholesterol test every 5 years, or more often if advised.    Have a diabetes test (fasting glucose) every three years. If you are at risk for diabetes, you should have this test more often.     If you are at risk for osteoporosis (brittle bone disease), think about having a bone density scan (DEXA).    Shots: Get a flu shot each year. Get a tetanus shot every 10 years.    Nutrition:     Eat at least 5 servings of fruits and vegetables each day.    Eat whole-grain bread, whole-wheat pasta and brown rice instead of white grains and rice.    Talk to your provider about Calcium and Vitamin D.     Lifestyle    Exercise at least 150 minutes a week (30 minutes a day, 5 days a week). This will help you control your weight and prevent disease.    Limit alcohol to one drink per day.    No smoking.     Wear sunscreen to prevent skin cancer.     See your dentist every six months for an exam and cleaning.    See your eye doctor every 1 to 2 years.    Be as fit as you can at your current weight.  Try not to gain any more weight.  If you can, try to lose weight.    Don't go to bed at night without knowing what  you are going to do for exercise the next day. (or eat the next day)    If you can't make the best choice, make a better choice.

## 2018-05-08 LAB — HCV AB SERPL QL IA: NONREACTIVE

## 2018-05-08 ASSESSMENT — PATIENT HEALTH QUESTIONNAIRE - PHQ9: SUM OF ALL RESPONSES TO PHQ QUESTIONS 1-9: 1

## 2018-05-08 ASSESSMENT — ANXIETY QUESTIONNAIRES: GAD7 TOTAL SCORE: 0

## 2018-11-06 ENCOUNTER — OFFICE VISIT (OUTPATIENT)
Dept: PEDIATRICS | Facility: CLINIC | Age: 53
End: 2018-11-06
Payer: COMMERCIAL

## 2018-11-06 VITALS
WEIGHT: 253.4 LBS | SYSTOLIC BLOOD PRESSURE: 100 MMHG | HEART RATE: 64 BPM | TEMPERATURE: 98.4 F | BODY MASS INDEX: 35.48 KG/M2 | HEIGHT: 71 IN | OXYGEN SATURATION: 95 % | DIASTOLIC BLOOD PRESSURE: 68 MMHG

## 2018-11-06 DIAGNOSIS — F41.9 ANXIETY: ICD-10-CM

## 2018-11-06 DIAGNOSIS — F33.41 RECURRENT MAJOR DEPRESSIVE DISORDER, IN PARTIAL REMISSION (H): Primary | ICD-10-CM

## 2018-11-06 PROCEDURE — 99214 OFFICE O/P EST MOD 30 MIN: CPT | Performed by: INTERNAL MEDICINE

## 2018-11-06 ASSESSMENT — ANXIETY QUESTIONNAIRES
1. FEELING NERVOUS, ANXIOUS, OR ON EDGE: SEVERAL DAYS
2. NOT BEING ABLE TO STOP OR CONTROL WORRYING: NOT AT ALL
GAD7 TOTAL SCORE: 2
IF YOU CHECKED OFF ANY PROBLEMS ON THIS QUESTIONNAIRE, HOW DIFFICULT HAVE THESE PROBLEMS MADE IT FOR YOU TO DO YOUR WORK, TAKE CARE OF THINGS AT HOME, OR GET ALONG WITH OTHER PEOPLE: NOT DIFFICULT AT ALL
7. FEELING AFRAID AS IF SOMETHING AWFUL MIGHT HAPPEN: NOT AT ALL
5. BEING SO RESTLESS THAT IT IS HARD TO SIT STILL: NOT AT ALL
6. BECOMING EASILY ANNOYED OR IRRITABLE: NOT AT ALL
3. WORRYING TOO MUCH ABOUT DIFFERENT THINGS: NOT AT ALL

## 2018-11-06 ASSESSMENT — PATIENT HEALTH QUESTIONNAIRE - PHQ9
5. POOR APPETITE OR OVEREATING: SEVERAL DAYS
SUM OF ALL RESPONSES TO PHQ QUESTIONS 1-9: 0

## 2018-11-06 NOTE — PROGRESS NOTES
SUBJECTIVE:   Alison Chandler is a 53 year old female who presents to clinic today for the following health issues:      Depression and Anxiety Follow-Up    Status since last visit: Improved with depression, anxiety and panic attacks worsened    Other associated symptoms:None    Complicating factors:     Significant life event: No     Current substance abuse: None    Had a severe anxiety episode in August this year. Not sure where it came from. Was very jealous and anxious and knew there was no reason for it. Since then, notices that anxiety is there. Doesn't have sad or depressed feelings, just feels more wishful thinking that things were different. Job is changing and doesn't have a replacement job yet. Feels like she is starting to lean toward panic attacks.     Last period was February.     PHQ 5/7/2018   PHQ-9 Total Score 1   Q9: Suicide Ideation Not at all     ROBI-7 SCORE 5/7/2018   Total Score 0       PHQ-9  English  PHQ-9   Any Language  ROBI-7  Suicide Assessment Five-step Evaluation and Treatment (SAFE-T)    Amount of exercise or physical activity: None    Problems taking medications regularly: No    Medication side effects: none    Diet: regular (no restrictions)    Problem list and histories reviewed & adjusted, as indicated.  Additional history: as documented    Patient Active Problem List   Diagnosis     Recurrent major depressive disorder, in partial remission (H)     History reviewed. No pertinent surgical history.    Social History   Substance Use Topics     Smoking status: Never Smoker     Smokeless tobacco: Never Used     Alcohol use Yes     Family History   Problem Relation Age of Onset     Diabetes Mother 50     type 2     Hypertension Mother      Hyperlipidemia Father            Reviewed and updated as needed this visit by clinical staff       Reviewed and updated as needed this visit by Provider         ROS:  Constitutional, HEENT, cardiovascular, pulmonary, gi and gu systems are  "negative, except as otherwise noted.    OBJECTIVE:     /68 (Cuff Size: Adult Large)  Pulse 64  Temp 98.4  F (36.9  C) (Oral)  Ht 5' 11\" (1.803 m)  Wt 253 lb 6.4 oz (114.9 kg)  LMP 01/18/2018  SpO2 95%  BMI 35.34 kg/m2  Body mass index is 35.34 kg/(m^2).  GENERAL: healthy, alert and no distress  PSYCH: mentation appears normal, affect normal/bright, judgement and insight intact and appearance well groomed    Diagnostic Test Results:  none     ASSESSMENT/PLAN:     1. Recurrent major depressive disorder, in partial remission (H)  Depression overall in good control, but increased anxiety symptoms.  - FLUoxetine (PROZAC) 20 MG capsule; Take 3 capsules (60 mg) by mouth daily  Dispense: 90 capsule; Refill: 1    2. 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 Discussed options. At this point, will increase fluoxetine and add therapy. Encouraged exercise. Discussed option to do prn propranolol or other, but she declines.  - MENTAL HEALTH REFERRAL  - Adult; Outpatient Treatment; Individual/Couples/Family/Group Therapy/Health Psychology; [facility]: [facility] [phone]; We will contact you to schedule the appointment or please call with any questions    Patient Instructions   Consider seeing Jerry Myles, a therapist at our clinic. You can call for an appointment - [phone].      Jv Mandel PsyD  [facility]  [address] [place] [id number] [phone]    Increase fluoxetine to 60 mg daily.    Look into other types of exercise you might like.    I spent 25 minutes with this patient face-to-face, of which 50% or greater was spent in counseling and coordination of care with regards to anxiety and depression. Please see plan above.    Nia Arshad MD  [facility]  "

## 2018-11-06 NOTE — LETTER
My Depression Action Plan  Name: Alison Chandler   Date of Birth 1965  Date: 11/6/2018    My doctor: Nia Arshad   My clinic: [address]  [address]  [facility] [address] [phone]          GREEN    ZONE   Good Control    What it looks like:     Things are going generally well. You have normal up s and down s. You may even feel depressed from time to time, but bad moods usually last less than a day.   What you need to do:  1. Continue to care for yourself (see self care plan)  2. Check your depression survival kit and update it as needed  3. Follow your physician s recommendations including any medication.  4. Do not stop taking medication unless you consult with your physician first.           YELLOW         ZONE Getting Worse    What it looks like:     Depression is starting to interfere with your life.     It may be hard to get out of bed; you may be starting to isolate yourself from others.    Symptoms of depression are starting to last most all day and this has happened for several days.     You may have suicidal thoughts but they are not constant.   What you need to do:     1. Call your care team, your response to treatment will improve if you keep your care team informed of your progress. Yellow periods are signs an adjustment may need to be made.     2. Continue your self-care, even if you have to fake it!    3. Talk to someone in your support network    4. Open up your depression survival kit           RED    ZONE Medical Alert - Get Help    What it looks like:     Depression is seriously interfering with your life.     You may experience these or other symptoms: You can t get out of bed most days, can t work or engage in other necessary activities, you have trouble taking care of basic hygiene, or basic responsibilities, thoughts of suicide or death that will not go away, self-injurious behavior.     What you need to do:  1. Call  your care team and request a same-day appointment. If they are not available (weekends or after hours) call your local crisis line, emergency room or 911.            Depression Self Care Plan / Survival Kit    Self-Care for Depression  Here s the deal. Your body and mind are really not as separate as most people think.  What you do and think affects how you feel and how you feel influences what you do and think. This means if you do things that people who feel good do, it will help you feel better.  Sometimes this is all it takes.  There is also a place for medication and therapy depending on how severe your depression is, so be sure to consult with your medical provider and/ or Behavioral Health Consultant if your symptoms are worsening or not improving.     In order to better manage my stress, I will:    Exercise  Get some form of exercise, every day. This will help reduce pain and release endorphins, the  feel good  chemicals in your brain. This is almost as good as taking antidepressants!  This is not the same as joining a gym and then never going! (they count on that by the way ) It can be as simple as just going for a walk or doing some gardening, anything that will get you moving.      Hygiene   Maintain good hygiene (Get out of bed in the morning, Make your bed, Brush your teeth, Take a shower, and Get dressed like you were going to work, even if you are unemployed).  If your clothes don't fit try to get ones that do.    Diet  I will strive to eat foods that are good for me, drink plenty of water, and avoid excessive sugar, caffeine, alcohol, and other mood-altering substances.  Some foods that are helpful in depression are: complex carbohydrates, B vitamins, flaxseed, fish or fish oil, fresh fruits and vegetables.    Psychotherapy  I agree to participate in Individual Therapy (if recommended).    Medication  If prescribed medications, I agree to take them.  Missing doses can result in serious side effects.   I understand that drinking alcohol, or other illicit drug use, may cause potential side effects.  I will not stop my medication abruptly without first discussing it with my provider.    Staying Connected With Others  I will stay in touch with my friends, family members, and my primary care provider/team.    Use your imagination  Be creative.  We all have a creative side; it doesn t matter if it s oil painting, sand castles, or mud pies! This will also kick up the endorphins.    Witness Beauty  (AKA stop and smell the roses) Take a look outside, even in mid-winter. Notice colors, textures. Watch the squirrels and birds.     Service to others  Be of service to others.  There is always someone else in need.  By helping others we can  get out of ourselves  and remember the really important things.  This also provides opportunities for practicing all the other parts of the program.    Humor  Laugh and be silly!  Adjust your TV habits for less news and crime-drama and more comedy.    Control your stress  Try breathing deep, massage therapy, biofeedback, and meditation. Find time to relax each day.     My support system    Clinic Contact:  Phone number:    Contact 1:  Phone number:    Contact 2:  Phone number:    Faith/:  Phone number:    Therapist:  Phone number:    [facility] crisis center:    Phone number:    Other community support:  Phone number:

## 2018-11-06 NOTE — MR AVS SNAPSHOT
After Visit Summary   11/6/2018    Alison Chandler    MRN: [id number]           Patient Information     Date Of Birth          1965        Visit Information        Provider Department      11/6/2018 8:30 AM [facility]ia [facility]ad [facility]        Today's Diagnoses     Recurrent major depressive disorder, in partial remission (H)    -  1    Anxiety          Care Instructions    Consider seeing Jerry Myles, a therapist at our clinic. You can call for an appointment - [phone].      Jv Mandel PsyD  [facility]  [address] [place] [address] [address]  [place] [address]  [phone]    Increase fluoxetine to 60 mg daily.    Look into other types of exercise you might like.          Follow-ups after your visit        Additional Services     MENTAL HEALTH REFERRAL  - Adult; Outpatient Treatment; Individual/Couples/Family/Group Therapy/Health Psychology; [facility]: [facility] [phone]; We will contact you to schedule the appointment or please call with any questions       All scheduling is subject to the client's specific insurance plan & benefits, provider/location availability, and provider clinical specialities.  Please arrive 15 minutes early for your first appointment and bring your completed paperwork.    Please be aware that coverage of these services is subject to the terms and limitations of your health insurance plan.  Call member services at your health plan with any benefit or coverage questions.                            Who to contact     If you have questions or need follow up information about today's clinic visit or your schedule please contact [facility] directly at [phone].  Normal or non-critical lab and imaging results will be communicated to you by MyChart, letter or phone within 4 business days after the clinic has received the results. If you do not hear from us within 7 days, please  "contact the clinic through [employer] or phone. If you have a critical or abnormal lab result, we will notify you by phone as soon as possible.  Submit refill requests through [employer] or call your pharmacy and they will forward the refill request to us. Please allow 3 business days for your refill to be completed.          Additional Information About Your Visit        [employer] Information     [employer] gives you secure access to your electronic health record. If you see a primary care provider, you can also send messages to your care team and make appointments. If you have questions, please call your primary care clinic.  If you do not have a primary care provider, please call [phone] and they will assist you.        Care EveryWhere ID     This is your Care EveryWhere ID. This could be used by other organizations to access your [place] medical records  [id number]        Your Vitals Were     Pulse Temperature Height Last Period Pulse Oximetry BMI (Body Mass Index)    64 98.4  F (36.9  C) (Oral) 5' 11\" (1.803 m) 01/18/2018 95% 35.34 kg/m2       Blood Pressure from Last 3 Encounters:   11/06/18 100/68   05/07/18 118/76   10/13/17 123/79    Weight from Last 3 Encounters:   11/06/18 253 lb 6.4 oz (114.9 kg)   05/07/18 245 lb 14.4 oz (111.5 kg)   03/01/17 253 lb 6.4 oz (114.9 kg)              We Performed the Following     MENTAL HEALTH REFERRAL  - Adult; Outpatient Treatment; Individual/Couples/Family/Group Therapy/Health Psychology; [facility]: [facility] [phone]; We will contact you to schedule the appointment or please call with any questions          Today's Medication Changes          These changes are accurate as of 11/6/18  9:35 AM.  If you have any questions, ask your nurse or doctor.               These medicines have changed or have updated prescriptions.        Dose/Directions    * FLUoxetine 40 MG capsule   Commonly known as:  PROzac   This may have changed:  Another medication with the " same name was added. Make sure you understand how and when to take each.   Used for:  Recurrent major depressive disorder, in partial remission (H)   Changed by:  Nia Arshad MD        Dose:  40 mg   Take 1 capsule (40 mg) by mouth daily   Quantity:  90 capsule   Refills:  1       * FLUoxetine 20 MG capsule   Commonly known as:  PROzac   This may have changed:  You were already taking a medication with the same name, and this prescription was added. Make sure you understand how and when to take each.   Used for:  Recurrent major depressive disorder, in partial remission (H)   Changed by:  Nia Arshad MD        Dose:  60 mg   Take 3 capsules (60 mg) by mouth daily   Quantity:  90 capsule   Refills:  1       * Notice:  This list has 2 medication(s) that are the same as other medications prescribed for you. Read the directions carefully, and ask your doctor or other care provider to review them with you.         Where to get your medicines      These medications were sent to [facility] [address] [place] [address] [address] [place] [id number] [facility] [place] [address]     Phone:  [phone]     FLUoxetine 20 MG capsule                Primary Care Provider Office Phone # Fax #    Nia Arshad [phone] [address] [place] [address]        Equal Access to Services     [facility] JARETT : Hadii neha ku hadasho Soomaali, waaxda luqadaha, qaybta kaalmada adeegyada, marcella tuttle. So [facility] [phone].    ATENCIÓN: Si habla español, tiene a frias disposición servicios gratuitos de asistencia lingüística. Llame al [phone].    We comply with applicable federal civil rights laws and [place] laws. We do not discriminate on the basis of race, color, national origin, age, disability, sex, sexual orientation, or gender identity.            Thank you!     Thank you for choosing [facility]  for your care. Our goal is always to provide you with excellent care. Hearing back from our patients is one way we can continue to improve our services. Please take a few minutes to complete the written survey that you may receive in the mail after your visit with us. Thank you!             Your Updated Medication List - Protect others around you: Learn how to safely use, store and throw away your medicines at www.disposemymeds.org.          This list is accurate as of 11/6/18  9:35 AM.  Always use your most recent med list.                   Brand Name Dispense Instructions for use Diagnosis    fish Oil 1200 MG capsule      Take 1 capsule by mouth daily        * FLUoxetine 40 MG capsule    PROzac    90 capsule    Take 1 capsule (40 mg) by mouth daily    Recurrent major depressive disorder, in partial remission (H)       * FLUoxetine 20 MG capsule    PROzac    90 capsule    Take 3 capsules (60 mg) by mouth daily    Recurrent major depressive disorder, in partial remission (H)       MULTIVITAMIN ADULT PO      Take by mouth daily        UNABLE TO FIND      MEDICATION NAME: calcium 100 mg daily        Vitamin D (Cholecalciferol) 1000 units Caps      Take 1 tablet by mouth 2 times daily        * Notice:  This list has 2 medication(s) that are the same as other medications prescribed for you. Read the directions carefully, and ask your doctor or other care provider to review them with you.

## 2018-11-07 ASSESSMENT — ANXIETY QUESTIONNAIRES: GAD7 TOTAL SCORE: 2

## 2018-11-15 PROBLEM — F41.9 ANXIETY: Status: ACTIVE | Noted: 2018-11-15

## 2018-12-10 ENCOUNTER — VIRTUAL VISIT (OUTPATIENT)
Dept: PEDIATRICS | Facility: CLINIC | Age: 53
End: 2018-12-10
Payer: COMMERCIAL

## 2018-12-10 DIAGNOSIS — F33.41 RECURRENT MAJOR DEPRESSIVE DISORDER, IN PARTIAL REMISSION (H): Primary | ICD-10-CM

## 2018-12-10 PROCEDURE — 99441 ZZC PHYSICIAN TELEPHONE EVALUATION 5-10 MIN: CPT | Performed by: INTERNAL MEDICINE

## 2018-12-10 RX ORDER — FLUOXETINE 40 MG/1
40 CAPSULE ORAL DAILY
Qty: 90 CAPSULE | Refills: 1 | Status: SHIPPED | OUTPATIENT
Start: 2018-12-10 | End: 2019-05-28 | Stop reason: DRUGHIGH

## 2018-12-10 ASSESSMENT — PATIENT HEALTH QUESTIONNAIRE - PHQ9: SUM OF ALL RESPONSES TO PHQ QUESTIONS 1-9: 0

## 2018-12-10 NOTE — PROGRESS NOTES
"Alison Chandler is a 53 year old female who is being evaluated via a telephone visit.      The patient has been notified of following (by ROSE Nguyen LPN       \"We have found that certain health care needs can be provided without the need for a physical exam.  This service lets us provide the care you need with a short phone conversation.  If a prescription is necessary we can send it directly to your pharmacy.  If lab work is needed we can place an order for that and you can then stop by our lab to have the test done at a later time.    This telephone visit will be conducted via 3 way call with the you (the patient) , the physician/provider, and a me all on the line at the same time.  This allows your physician/provider to have the phone conversation with you while I will be taking notes for your medical record.  We will have full access to your [place] medical record during this entire phone call.    Since this is like an office visit,  will bill your insurance company for this service.  Please check with your medical insurance if this type of telephone/virtual is covered . You may be responsible for the cost of this service if insurance coverage is denied.  The typical cost is $30 (10min), $59(11-20min) and $85 (21-30min)     If during the course of the call the physician/provider feels a telephone visit is not appropriate, you will not be charged for this service\"    Consent has been obtained for this service by care team member: yes.  See the scanned image in the medical record.    S: The history as provided by the patient to the provider during this 3 way call include:    Feels well. No symptoms of depression despite her contract job ending. New contract won't start until January. Has been exercising doing paddleboard yoga at the [facility] and loves it. Wondering about decreasing her dose to 40 mg on fluoxetine.    Pertinent parts of the the patient's medical history reviewed " and confirmed by the provider included :      Total time of call between patient and provider was 9 minutes     PHQ-9 SCORE 5/7/2018 11/6/2018 12/10/2018   PHQ-9 Total Score 1 0 0         Nia Arshad M.D. (MD signature)  ===================================================    I have reviewed the note as documented above.  This accurately captures the substance of my conversation with the patient,    Additional provider notes:    Assessment/Plan:  1. Recurrent major depressive disorder, in partial remission (H)  Doing well. Plan decrease fluoxetine. She will let me know if symptoms worsen.   - FLUoxetine (PROZAC) 40 MG capsule; Take 1 capsule (40 mg) by mouth daily  Dispense: 90 capsule; Refill: 1

## 2019-01-16 DIAGNOSIS — Z12.31 ENCOUNTER FOR SCREENING MAMMOGRAM FOR BREAST CANCER: Primary | ICD-10-CM

## 2019-01-18 ENCOUNTER — OFFICE VISIT (OUTPATIENT)
Dept: PSYCHOLOGY | Facility: CLINIC | Age: 54
End: 2019-01-18
Payer: COMMERCIAL

## 2019-01-18 DIAGNOSIS — F43.23 ADJUSTMENT DISORDER WITH MIXED ANXIETY AND DEPRESSED MOOD: Primary | ICD-10-CM

## 2019-01-18 PROCEDURE — 90791 PSYCH DIAGNOSTIC EVALUATION: CPT | Performed by: SOCIAL WORKER

## 2019-01-18 ASSESSMENT — ANXIETY QUESTIONNAIRES
6. BECOMING EASILY ANNOYED OR IRRITABLE: NOT AT ALL
3. WORRYING TOO MUCH ABOUT DIFFERENT THINGS: SEVERAL DAYS
IF YOU CHECKED OFF ANY PROBLEMS ON THIS QUESTIONNAIRE, HOW DIFFICULT HAVE THESE PROBLEMS MADE IT FOR YOU TO DO YOUR WORK, TAKE CARE OF THINGS AT HOME, OR GET ALONG WITH OTHER PEOPLE: NOT DIFFICULT AT ALL
1. FEELING NERVOUS, ANXIOUS, OR ON EDGE: SEVERAL DAYS
2. NOT BEING ABLE TO STOP OR CONTROL WORRYING: NOT AT ALL
5. BEING SO RESTLESS THAT IT IS HARD TO SIT STILL: NOT AT ALL
7. FEELING AFRAID AS IF SOMETHING AWFUL MIGHT HAPPEN: NOT AT ALL
GAD7 TOTAL SCORE: 2

## 2019-01-18 ASSESSMENT — PATIENT HEALTH QUESTIONNAIRE - PHQ9
5. POOR APPETITE OR OVEREATING: NOT AT ALL
SUM OF ALL RESPONSES TO PHQ QUESTIONS 1-9: 3
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
## 2019-01-19 ASSESSMENT — ANXIETY QUESTIONNAIRES: GAD7 TOTAL SCORE: 2

## 2019-01-21 ENCOUNTER — ANCILLARY PROCEDURE (OUTPATIENT)
Dept: MAMMOGRAPHY | Facility: CLINIC | Age: 54
End: 2019-01-21
Payer: COMMERCIAL

## 2019-01-21 DIAGNOSIS — Z12.31 ENCOUNTER FOR SCREENING MAMMOGRAM FOR BREAST CANCER: ICD-10-CM

## 2019-01-21 PROCEDURE — 77067 SCR MAMMO BI INCL CAD: CPT | Mod: TC

## 2019-01-29 ENCOUNTER — OFFICE VISIT (OUTPATIENT)
Dept: PSYCHOLOGY | Facility: CLINIC | Age: 54
End: 2019-01-29
Payer: COMMERCIAL

## 2019-01-29 DIAGNOSIS — F43.23 ADJUSTMENT DISORDER WITH MIXED ANXIETY AND DEPRESSED MOOD: Primary | ICD-10-CM

## 2019-01-29 PROCEDURE — 90834 PSYTX W PT 45 MINUTES: CPT | Performed by: SOCIAL WORKER

## 2019-01-29 ASSESSMENT — ANXIETY QUESTIONNAIRES
GAD7 TOTAL SCORE: 0
2. NOT BEING ABLE TO STOP OR CONTROL WORRYING: NOT AT ALL
1. FEELING NERVOUS, ANXIOUS, OR ON EDGE: NOT AT ALL
7. FEELING AFRAID AS IF SOMETHING AWFUL MIGHT HAPPEN: NOT AT ALL
6. BECOMING EASILY ANNOYED OR IRRITABLE: NOT AT ALL
5. BEING SO RESTLESS THAT IT IS HARD TO SIT STILL: NOT AT ALL
IF YOU CHECKED OFF ANY PROBLEMS ON THIS QUESTIONNAIRE, HOW DIFFICULT HAVE THESE PROBLEMS MADE IT FOR YOU TO DO YOUR WORK, TAKE CARE OF THINGS AT HOME, OR GET ALONG WITH OTHER PEOPLE: NOT DIFFICULT AT ALL
3. WORRYING TOO MUCH ABOUT DIFFERENT THINGS: NOT AT ALL

## 2019-01-29 ASSESSMENT — PATIENT HEALTH QUESTIONNAIRE - PHQ9
SUM OF ALL RESPONSES TO PHQ QUESTIONS 1-9: 0
5. POOR APPETITE OR OVEREATING: NOT AT ALL

## 2019-01-29 NOTE — PROGRESS NOTES
"                                           Progress Note    Client Name: Alison Chandler  Date: 1/29/19         Service Type: Individual     Session Start Time: 8:00am Session End Time: 8:50am      Session Length: 50 minutes     Session #: 2     Attendees: Client attended alone    Treatment Plan Last Reviewed: Not completed  PHQ-9 / ROBI-7 : Reviewed, pt's score on both assessments decreased     DATA      Progress Since Last Session (Related to Symptoms / Goals / Homework):   Symptoms: Improving Pt states she has been engaging in more tasks and responsibilities over the past week. She also enjoyed herself at several family gatherings.    Homework: homework was not assigned last session      Episode of Care Goals: Minimal progress - ACTION (Actively working towards change); Intervened by reinforcing change plan / affirming steps taken     Current / Ongoing Stressors and Concerns: Pt reports reflecting over the past week how important trust is to her in relationships. She values the amount of trust and openess she has with her son and acknowledges that she did not have the level of trust with her father. She continues to educate herself on her son's polyamorous lifestyle and wants to be open and accepting of the \"new generation.\" She worries about her son's emotional safety as he has been bullied in the past. She shared that she had [place] in [place] which informs her life outlook, including often questioning others intentions. PTSD was ruled out as a diagnosis. We also discussed a period in the client's life where her son did not communicate with her for one year. She explained that she in despair and was grieving the loss of not talking with her son.      Treatment Objective(s) Addressed in This Session:   Participate in 3 activities to improve mood. Pt was able to identify several activities to improve her mood including engaging in tasks related to her.     Intervention:   CBT: Discussed pt's " "core belief of \"I'm a failure.\" Pt states that she \"evaluates everything she did wrong as a parent.\" Helped the pt assess what she did right as a parent. Pt states that as a child, her father rarely complimented her of her achievements and instead pointed out her flaws. Pt discussed an incident last week where her son made an almost perfect meal. Instead of enjoying the meal, she was distracted with her urge to tell her son what was wrong with the meal. She mentioned that her son does not know the reason she is in therapy and she believes he may think it is in part because of him. In session, she developed a self compassionate statement that she would tell him if he asks. She will say, \"therapy is to help me accept myself for who I am.\" She reports it is difficultly for her to accept compliments about herself but \"she is getting better at it.\"         ASSESSMENT: Current Emotional / Mental Status (status of significant symptoms):   Risk status (Self / Other harm or suicidal ideation)   Client denies current fears or concerns for personal safety.   Client denies current or recent suicidal ideation or behaviors.   Client denies current or recent homicidal ideation or behaviors.   Client denies current or recent self injurious behavior or ideation.   Client denies other safety concerns.   Client Client reports there has been no change in risk factors since their last session.     Client Client reports there has been no change in protective factors since their last session.     A safety and risk management plan has not been developed at this time, however client was given the after-hours number / 911 should there be a change in any of these risk factors.     Appearance:   Appropriate    Eye Contact:   Good    Psychomotor Behavior: Normal    Attitude:   Cooperative    Orientation:   All   Speech    Rate / Production: Normal     Volume:  Normal    Mood:    Normal   Affect:    Appropriate  Worrisome    Thought " Content:  Clear    Thought Form:  Coherent  Logical    Insight:    Fair      Medication Review:   No changes to current psychiatric medication(s)     Medication Compliance:   Yes     Changes in Health Issues:   None reported     Chemical Use Review:   Substance Use: Chemical use reviewed, no active concerns identified      Tobacco Use: No current tobacco use.       Collateral Reports Completed:   Not Applicable    PLAN: (Client Tasks / Therapist Tasks / Other) Pt will start to identify her perspective as herself and her perspective as a parent.                            ANTONI Quinones, LGSW  January 29, 2019  Note reviewed and clinical supervision by ANTONI Acosta [facility] 2/14/2019

## 2019-01-30 ASSESSMENT — ANXIETY QUESTIONNAIRES: GAD7 TOTAL SCORE: 0

## 2019-02-15 ENCOUNTER — OFFICE VISIT (OUTPATIENT)
Dept: PSYCHOLOGY | Facility: CLINIC | Age: 54
End: 2019-02-15
Payer: COMMERCIAL

## 2019-02-15 DIAGNOSIS — F43.23 ADJUSTMENT DISORDER WITH MIXED ANXIETY AND DEPRESSED MOOD: Primary | ICD-10-CM

## 2019-02-15 PROCEDURE — 90834 PSYTX W PT 45 MINUTES: CPT | Performed by: SOCIAL WORKER

## 2019-02-15 ASSESSMENT — PATIENT HEALTH QUESTIONNAIRE - PHQ9
5. POOR APPETITE OR OVEREATING: NOT AT ALL
SUM OF ALL RESPONSES TO PHQ QUESTIONS 1-9: 0

## 2019-02-15 ASSESSMENT — ANXIETY QUESTIONNAIRES
1. FEELING NERVOUS, ANXIOUS, OR ON EDGE: NOT AT ALL
2. NOT BEING ABLE TO STOP OR CONTROL WORRYING: NOT AT ALL
5. BEING SO RESTLESS THAT IT IS HARD TO SIT STILL: NOT AT ALL
GAD7 TOTAL SCORE: 0
3. WORRYING TOO MUCH ABOUT DIFFERENT THINGS: NOT AT ALL
7. FEELING AFRAID AS IF SOMETHING AWFUL MIGHT HAPPEN: NOT AT ALL
6. BECOMING EASILY ANNOYED OR IRRITABLE: NOT AT ALL

## 2019-02-15 NOTE — Clinical Note
Jose J afternoon Zhane,Can you please review and suggestion changes to this note and treatment plan? Thanks!Bradley
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
## 2019-02-16 ASSESSMENT — ANXIETY QUESTIONNAIRES: GAD7 TOTAL SCORE: 0

## 2019-02-22 ENCOUNTER — OFFICE VISIT (OUTPATIENT)
Dept: PSYCHOLOGY | Facility: CLINIC | Age: 54
End: 2019-02-22
Payer: COMMERCIAL

## 2019-02-22 DIAGNOSIS — F43.23 ADJUSTMENT DISORDER WITH MIXED ANXIETY AND DEPRESSED MOOD: Primary | ICD-10-CM

## 2019-02-22 PROCEDURE — 90834 PSYTX W PT 45 MINUTES: CPT | Performed by: SOCIAL WORKER
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
## 2019-02-22 NOTE — Clinical Note
Good morning Zhane,Can you please review and recommend changes/ suggestions to this note. Thanks!Bradley

## 2019-02-24 ASSESSMENT — ANXIETY QUESTIONNAIRES
1. FEELING NERVOUS, ANXIOUS, OR ON EDGE: NOT AT ALL
2. NOT BEING ABLE TO STOP OR CONTROL WORRYING: NOT AT ALL
3. WORRYING TOO MUCH ABOUT DIFFERENT THINGS: NOT AT ALL
6. BECOMING EASILY ANNOYED OR IRRITABLE: NOT AT ALL
7. FEELING AFRAID AS IF SOMETHING AWFUL MIGHT HAPPEN: NOT AT ALL
GAD7 TOTAL SCORE: 0
5. BEING SO RESTLESS THAT IT IS HARD TO SIT STILL: NOT AT ALL

## 2019-02-24 ASSESSMENT — PATIENT HEALTH QUESTIONNAIRE - PHQ9
5. POOR APPETITE OR OVEREATING: NOT AT ALL
SUM OF ALL RESPONSES TO PHQ QUESTIONS 1-9: 1

## 2019-02-25 ASSESSMENT — ANXIETY QUESTIONNAIRES: GAD7 TOTAL SCORE: 0

## 2019-03-04 ENCOUNTER — OFFICE VISIT (OUTPATIENT)
Dept: PSYCHOLOGY | Facility: CLINIC | Age: 54
End: 2019-03-04
Payer: COMMERCIAL

## 2019-03-04 DIAGNOSIS — F43.23 ADJUSTMENT DISORDER WITH MIXED ANXIETY AND DEPRESSED MOOD: Primary | ICD-10-CM

## 2019-03-04 PROCEDURE — 90832 PSYTX W PT 30 MINUTES: CPT | Performed by: SOCIAL WORKER
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
## 2019-05-26 ASSESSMENT — ENCOUNTER SYMPTOMS
HEMATURIA: 0
JOINT SWELLING: 0
HEARTBURN: 0
CHILLS: 0
BREAST MASS: 0
DIZZINESS: 0
FEVER: 0
PARESTHESIAS: 0
WEAKNESS: 0
SHORTNESS OF BREATH: 0
NERVOUS/ANXIOUS: 0
HEADACHES: 0
DIARRHEA: 0
SORE THROAT: 0
PALPITATIONS: 0
DYSURIA: 0
CONSTIPATION: 0
ARTHRALGIAS: 0
ABDOMINAL PAIN: 0
NAUSEA: 0
EYE PAIN: 0
MYALGIAS: 0
COUGH: 0
FREQUENCY: 0
HEMATOCHEZIA: 0

## 2019-05-28 ENCOUNTER — OFFICE VISIT (OUTPATIENT)
Dept: PEDIATRICS | Facility: CLINIC | Age: 54
End: 2019-05-28
Payer: COMMERCIAL

## 2019-05-28 VITALS
HEART RATE: 59 BPM | BODY MASS INDEX: 34.8 KG/M2 | TEMPERATURE: 97.6 F | DIASTOLIC BLOOD PRESSURE: 74 MMHG | SYSTOLIC BLOOD PRESSURE: 114 MMHG | HEIGHT: 71 IN | WEIGHT: 248.6 LBS | OXYGEN SATURATION: 98 %

## 2019-05-28 DIAGNOSIS — Z12.4 CERVICAL CANCER SCREENING: ICD-10-CM

## 2019-05-28 DIAGNOSIS — Z13.6 CARDIOVASCULAR SCREENING; LDL GOAL LESS THAN 160: ICD-10-CM

## 2019-05-28 DIAGNOSIS — F33.41 RECURRENT MAJOR DEPRESSIVE DISORDER, IN PARTIAL REMISSION (H): ICD-10-CM

## 2019-05-28 DIAGNOSIS — Z00.00 ROUTINE GENERAL MEDICAL EXAMINATION AT A HEALTH CARE FACILITY: Primary | ICD-10-CM

## 2019-05-28 LAB
CHOLEST SERPL-MCNC: 153 MG/DL
GLUCOSE SERPL-MCNC: 97 MG/DL (ref 70–99)
HBA1C MFR BLD: 5.6 % (ref 0–5.6)
HDLC SERPL-MCNC: 61 MG/DL
LDLC SERPL CALC-MCNC: 72 MG/DL
NONHDLC SERPL-MCNC: 92 MG/DL
TRIGL SERPL-MCNC: 98 MG/DL

## 2019-05-28 PROCEDURE — 82947 ASSAY GLUCOSE BLOOD QUANT: CPT | Performed by: INTERNAL MEDICINE

## 2019-05-28 PROCEDURE — 87624 HPV HI-RISK TYP POOLED RSLT: CPT | Performed by: INTERNAL MEDICINE

## 2019-05-28 PROCEDURE — 87389 HIV-1 AG W/HIV-1&-2 AB AG IA: CPT | Performed by: INTERNAL MEDICINE

## 2019-05-28 PROCEDURE — 83036 HEMOGLOBIN GLYCOSYLATED A1C: CPT | Performed by: INTERNAL MEDICINE

## 2019-05-28 PROCEDURE — G0145 SCR C/V CYTO,THINLAYER,RESCR: HCPCS | Performed by: INTERNAL MEDICINE

## 2019-05-28 PROCEDURE — 99396 PREV VISIT EST AGE 40-64: CPT | Performed by: INTERNAL MEDICINE

## 2019-05-28 PROCEDURE — 80061 LIPID PANEL: CPT | Performed by: INTERNAL MEDICINE

## 2019-05-28 PROCEDURE — 36415 COLL VENOUS BLD VENIPUNCTURE: CPT | Performed by: INTERNAL MEDICINE

## 2019-05-28 PROCEDURE — 99213 OFFICE O/P EST LOW 20 MIN: CPT | Mod: 25 | Performed by: INTERNAL MEDICINE

## 2019-05-28 ASSESSMENT — ENCOUNTER SYMPTOMS
NAUSEA: 0
HEMATOCHEZIA: 0
DIZZINESS: 0
EYE PAIN: 0
ARTHRALGIAS: 0
WEAKNESS: 0
PARESTHESIAS: 0
FREQUENCY: 0
ABDOMINAL PAIN: 0
HEADACHES: 0
SHORTNESS OF BREATH: 0
SORE THROAT: 0
BREAST MASS: 0
JOINT SWELLING: 0
COUGH: 0
MYALGIAS: 0
NERVOUS/ANXIOUS: 0
DYSURIA: 0
HEMATURIA: 0
CONSTIPATION: 0
DIARRHEA: 0
HEARTBURN: 0
CHILLS: 0
FEVER: 0
PALPITATIONS: 0

## 2019-05-28 ASSESSMENT — PATIENT HEALTH QUESTIONNAIRE - PHQ9: SUM OF ALL RESPONSES TO PHQ QUESTIONS 1-9: 0

## 2019-05-28 ASSESSMENT — MIFFLIN-ST. JEOR: SCORE: 1827.89

## 2019-05-28 NOTE — PATIENT INSTRUCTIONS

## 2019-05-28 NOTE — PROGRESS NOTES
SUBJECTIVE:   CC: Alison Chandler is an 54 year old woman who presents for preventive health visit.     Healthy Habits:     Getting at least 3 servings of Calcium per day:  Yes    Bi-annual eye exam:  Yes    Dental care twice a year:  Yes    Sleep apnea or symptoms of sleep apnea:  None    Diet:  Regular (no restrictions)    Frequency of exercise:  1 day/week    Duration of exercise:  15-30 minutes    Taking medications regularly:  Yes    Medication side effects:  Not applicable    PHQ-2 Total Score: 0    Additional concerns today:  No      Depression Followup    How are you doing with your depression since your last visit? No change    Are you having other symptoms that might be associated with depression? No    Have you had a significant life event?  No     Are you feeling anxious or having panic attacks?   Yes:  maybe    Do you have any concerns with your use of alcohol or other drugs? No     Contract ended in November and didn't get rehired until March and still did OK with mood. No anxiety, knew it would come through, but just had to wait. She is wondering if she can decrease her fluoxetine dose. Did see a therapist and it helped a lot.    Social History     Tobacco Use     Smoking status: Never Smoker     Smokeless tobacco: Never Used   Substance Use Topics     Alcohol use: Yes     Frequency: 2-4 times a month     Drinks per session: 1 or 2     Binge frequency: Never     Drug use: No     PHQ 1/29/2019 2/15/2019 2/24/2019   PHQ-9 Total Score 0 0 1   Q9: Thoughts of better off dead/self-harm past 2 weeks Not at all Not at all Not at all     ROBI-7 SCORE 1/29/2019 2/15/2019 2/24/2019   Total Score 0 0 0       Today's PHQ-2 Score:   PHQ-2 ( 1999 Pfizer) 5/26/2019   Q1: Little interest or pleasure in doing things 0   Q2: Feeling down, depressed or hopeless 0   PHQ-2 Score 0   Q1: Little interest or pleasure in doing things Not at all   Q2: Feeling down, depressed or hopeless Not at all   PHQ-2 Score 0        Abuse: Current or Past(Physical, Sexual or Emotional)- No  Do you feel safe in your environment? Yes    Social History     Tobacco Use     Smoking status: Never Smoker     Smokeless tobacco: Never Used   Substance Use Topics     Alcohol use: Yes     Frequency: 2-4 times a month     Drinks per session: 1 or 2     Binge frequency: Never         Alcohol Use 5/26/2019   Prescreen: >3 drinks/day or >7 drinks/week? No   Prescreen: >3 drinks/day or >7 drinks/week? -       Reviewed orders with patient.  Reviewed health maintenance and updated orders accordingly - Yes  Lab work is in process  Labs reviewed in EPIC  BP Readings from Last 3 Encounters:   05/28/19 114/74   11/06/18 100/68   05/07/18 118/76    Wt Readings from Last 3 Encounters:   05/28/19 112.8 kg (248 lb 9.6 oz)   11/06/18 114.9 kg (253 lb 6.4 oz)   05/07/18 111.5 kg (245 lb 14.4 oz)           Mammogram Screening: Patient over age 50, mutual decision to screen reflected in health maintenance.    Pertinent mammograms are reviewed under the imaging tab.  History of abnormal Pap smear: NO - age 30- 65 PAP every 3 years recommended  Last 3 Pap Results: No results found for: PAP     Reviewed and updated as needed this visit by clinical staff  Tobacco  Allergies  Med Hx  Surg Hx  Fam Hx  Soc Hx        Reviewed and updated as needed this visit by Provider            Review of Systems   Constitutional: Negative for chills and fever.   HENT: Negative for congestion, ear pain, hearing loss and sore throat.    Eyes: Negative for pain and visual disturbance.   Respiratory: Negative for cough and shortness of breath.    Cardiovascular: Negative for chest pain, palpitations and peripheral edema.   Gastrointestinal: Negative for abdominal pain, constipation, diarrhea, heartburn, hematochezia and nausea.   Breasts:  Negative for tenderness, breast mass and discharge.   Genitourinary: Negative for dysuria, frequency, genital sores, hematuria, pelvic pain, urgency,  "vaginal bleeding and vaginal discharge.   Musculoskeletal: Negative for arthralgias, joint swelling and myalgias.   Skin: Negative for rash.   Neurological: Negative for dizziness, weakness, headaches and paresthesias.   Psychiatric/Behavioral: Negative for mood changes. The patient is not nervous/anxious.           OBJECTIVE:   /74 (BP Location: Right arm, Patient Position: Sitting, Cuff Size: Adult Large)   Pulse 59   Temp 97.6  F (36.4  C) (Tympanic)   Ht 1.81 m (5' 11.26\")   Wt 112.8 kg (248 lb 9.6 oz)   LMP 01/18/2018   SpO2 98%   BMI 34.42 kg/m    Physical Exam  GENERAL: healthy, alert and no distress  EYES: Eyes grossly normal to inspection, PERRL and conjunctivae and sclerae normal  HENT: ear canals and TM's normal, nose and mouth without ulcers or lesions  NECK: no adenopathy, no asymmetry, masses, or scars and thyroid normal to palpation  RESP: lungs clear to auscultation - no rales, rhonchi or wheezes  BREAST: normal without masses, tenderness or nipple discharge and no palpable axillary masses or adenopathy  CV: regular rate and rhythm, normal S1 S2, no S3 or S4, no murmur, click or rub, no peripheral edema and peripheral pulses strong  ABDOMEN: soft, nontender, no hepatosplenomegaly, no masses and bowel sounds normal   (female): normal female external genitalia, normal urethral meatus, vaginal mucosa pink, moist, well rugated, and normal cervix/adnexa/uterus without masses or discharge  MS: no gross musculoskeletal defects noted, no edema  SKIN: no suspicious lesions or rashes  NEURO: Normal strength and tone, mentation intact and speech normal  PSYCH: mentation appears normal, affect normal/bright    Diagnostic Test Results:  Labs reviewed in Epic    ASSESSMENT/PLAN:   1. Routine general medical examination at a health care facility    - HIV Antigen Antibody Combo    2. Recurrent major depressive disorder, in partial remission (H)  Trial on lower dose fluoxetine. She will let me know if " "symptoms increase.   - FLUoxetine (PROZAC) 20 MG capsule; Take 1 capsule (20 mg) by mouth daily  Dispense: 90 capsule; Refill: 1    3. CARDIOVASCULAR SCREENING; LDL GOAL LESS THAN 160    - Lipid panel reflex to direct LDL Fasting  - Hemoglobin A1c  - Glucose    COUNSELING:  Reviewed preventive health counseling, as reflected in patient instructions    Estimated body mass index is 34.42 kg/m  as calculated from the following:    Height as of this encounter: 1.81 m (5' 11.26\").    Weight as of this encounter: 112.8 kg (248 lb 9.6 oz).    Weight management plan: Discussed healthy diet and exercise guidelines     reports that she has never smoked. She has never used smokeless tobacco.      Counseling Resources:  ATP IV Guidelines  Pooled Cohorts Equation Calculator  Breast Cancer Risk Calculator  FRAX Risk Assessment  ICSI Preventive Guidelines  Dietary Guidelines for Americans, 2010  USDA's MyPlate  ASA Prophylaxis  Lung CA Screening    Nia Arshad MD  [facility]  "

## 2019-05-29 LAB — HIV 1+2 AB+HIV1 P24 AG SERPL QL IA: NONREACTIVE

## 2019-05-30 LAB
COPATH REPORT: NORMAL
PAP: NORMAL

## 2019-05-31 LAB
FINAL DIAGNOSIS: NORMAL
HPV HR 12 DNA CVX QL NAA+PROBE: NEGATIVE
HPV16 DNA SPEC QL NAA+PROBE: NEGATIVE
HPV18 DNA SPEC QL NAA+PROBE: NEGATIVE
SPECIMEN DESCRIPTION: NORMAL
SPECIMEN SOURCE CVX/VAG CYTO: NORMAL

## 2019-06-10 ENCOUNTER — TELEPHONE (OUTPATIENT)
Dept: PEDIATRICS | Facility: CLINIC | Age: 54
End: 2019-06-10

## 2019-06-10 DIAGNOSIS — D22.9 CHANGE IN MOLE: Primary | ICD-10-CM

## 2019-06-10 NOTE — TELEPHONE ENCOUNTER
Patient called requesting a referral for dermatology. Patient was advised to check w/ insurance to see if she needs one, patient would like a referral placed just in case. Patient said she was seen in [facility] for dermatology. Patient has a mole on the back of her left arm that was changing texture. Please call [phone] with questions, okay to leave .

## 2019-07-17 ENCOUNTER — VIRTUAL VISIT (OUTPATIENT)
Dept: PEDIATRICS | Facility: CLINIC | Age: 54
End: 2019-07-17
Payer: COMMERCIAL

## 2019-07-17 DIAGNOSIS — F41.9 ANXIETY: ICD-10-CM

## 2019-07-17 DIAGNOSIS — F33.41 RECURRENT MAJOR DEPRESSIVE DISORDER, IN PARTIAL REMISSION (H): Primary | ICD-10-CM

## 2019-07-17 PROCEDURE — 99442 ZZC PHYSICIAN TELEPHONE EVALUATION 11-20 MIN: CPT | Performed by: INTERNAL MEDICINE

## 2019-07-17 RX ORDER — FLUOXETINE 40 MG/1
40 CAPSULE ORAL DAILY
Qty: 90 CAPSULE | Refills: 3 | Status: SHIPPED | OUTPATIENT
Start: 2019-07-17 | End: 2020-09-11

## 2019-07-17 NOTE — PROGRESS NOTES
"Alison Chandler is a 54 year old female who is being evaluated via a telephone visit.      The patient has been notified of following (by ROSE Campo     \"We have found that certain health care needs can be provided without the need for a physical exam.  This service lets us provide the care you need with a short phone conversation.  If a prescription is necessary we can send it directly to your pharmacy.  If lab work is needed we can place an order for that and you can then stop by our lab to have the test done at a later time.    This telephone visit will be conducted via 3 way call with the you (the patient) , the physician/provider, and a me all on the line at the same time.  This allows your physician/provider to have the phone conversation with you while I will be taking notes for your medical record.  We will have full access to your [facility] medical record during this entire phone call.    Since this is like an office visit,  will bill your insurance company for this service.  Please check with your medical insurance if this type of telephone/virtual is covered . You may be responsible for the cost of this service if insurance coverage is denied.  The typical cost is $30 (10min), $59(11-20min) and $85 (21-30min)     If during the course of the call the physician/provider feels a telephone visit is not appropriate, you will not be charged for this service\"    Consent has been obtained for this service by care team member: yes.  See the scanned image in the medical record.    S: The history as provided by the patient to the provider during this 3 way call include     Pertinent parts of the the patient's medical history reviewed and confirmed by the provider included :      Total time of call between patient and provider was 13 minutes     Nia Arshad M.D.   (MD signature)  ===================================================    I have reviewed the note as documented above.  This accurately captures " the substance of my conversation with the patient,    Additional provider notes: since decreasing her fluoxetine to 20 mg. Now has started to have active nightmares. These happened when she was a kid as well. These are a bit distressing to her. To her, seems like this is a sign of less control. This was happening before when her depression was worse. Also notes more self-doubt creeping in for no reason, when she is doing things well. Thinks she is catching this anxiety early.    Assessment/Plan:  1. Recurrent major depressive disorder, in partial remission (H)  Increase dose again to 40 mg. Discussed option to switch to wellbutrin, but since her primary symptoms now is more anxiety, and she didn't have weight gain with fluoxetine increase in dose before, is reasonable to stick with the fluoxetine.     2. Anxiety  As above.

## 2019-07-31 ENCOUNTER — TRANSFERRED RECORDS (OUTPATIENT)
Dept: HEALTH INFORMATION MANAGEMENT | Facility: CLINIC | Age: 54
End: 2019-07-31

## 2019-07-31 ENCOUNTER — OFFICE VISIT (OUTPATIENT)
Dept: DERMATOLOGY | Facility: CLINIC | Age: 54
End: 2019-07-31
Payer: COMMERCIAL

## 2019-07-31 VITALS — DIASTOLIC BLOOD PRESSURE: 78 MMHG | HEART RATE: 61 BPM | SYSTOLIC BLOOD PRESSURE: 129 MMHG

## 2019-07-31 DIAGNOSIS — L57.0 ACTINIC KERATOSES: ICD-10-CM

## 2019-07-31 DIAGNOSIS — D48.5 NEOPLASM OF UNCERTAIN BEHAVIOR OF SKIN: ICD-10-CM

## 2019-07-31 DIAGNOSIS — D18.01 CHERRY ANGIOMA: Primary | ICD-10-CM

## 2019-07-31 PROCEDURE — 17000 DESTRUCT PREMALG LESION: CPT | Mod: 59 | Performed by: PHYSICIAN ASSISTANT

## 2019-07-31 PROCEDURE — 99213 OFFICE O/P EST LOW 20 MIN: CPT | Mod: 25 | Performed by: PHYSICIAN ASSISTANT

## 2019-07-31 PROCEDURE — 11102 TANGNTL BX SKIN SINGLE LES: CPT | Performed by: PHYSICIAN ASSISTANT

## 2019-07-31 PROCEDURE — 88305 TISSUE EXAM BY PATHOLOGIST: CPT | Mod: TC | Performed by: PHYSICIAN ASSISTANT

## 2019-07-31 NOTE — LETTER
7/31/2019         RE: Alison Chandler  [address] [facility] [address]        Dear Colleague,    Thank you for referring your patient, Alison Chandler, to the [facility]. Please see a copy of my visit note below.    HPI:  Alison Chandler is a 54 year old female patient here today for growth on left arm .  Patient states this has been present for a while.  Patient reports the following symptoms: growing .  Patient reports the following previous treatments: none.  Patient reports the following modifying factors: none.  Associated symptoms: none.  Patient has no other skin complaints today.  Remainder of the HPI, Meds, PMH, Allergies, FH, and SH was reviewed in chart.    Pertinent Hx:   No personal or family history of skin cancer    History reviewed. No pertinent past medical history.    History reviewed. No pertinent surgical history.     Family History   Problem Relation Age of Onset     Diabetes Mother 50        type 2     Hypertension Mother      Hyperlipidemia Father        Social History     Socioeconomic History     Marital status:      Spouse name: Not on file     Number of children: 1     Years of education: Not on file     Highest education level: Bachelor's degree (e.g., BA, AB, BS)   Occupational History     Occupation:      Employer: OTHER     Comment: [employer]   Social Needs     Financial resource strain: Not hard at all     Food insecurity:     Worry: Never true     Inability: Never true     Transportation needs:     Medical: No     Non-medical: No   Tobacco Use     Smoking status: Never Smoker     Smokeless tobacco: Never Used   Substance and Sexual Activity     Alcohol use: Yes     Frequency: 2-4 times a month     Drinks per session: 1 or 2     Binge frequency: Never     Drug use: No     Sexual activity: Yes   Lifestyle     Physical activity:     Days per week: 1 day     Minutes per session: 30 min      Stress: Only a little   Relationships     Social connections:     Talks on phone: More than three times a week     Gets together: Twice a week     Attends Spiritism service: Never     Active member of club or organization: No     Attends meetings of clubs or organizations: Patient refused     Relationship status:      Intimate partner violence:     Fear of current or ex partner: Not on file     Emotionally abused: Not on file     Physically abused: Not on file     Forced sexual activity: Not on file   Other Topics Concern     Parent/sibling w/ CABG, MI or angioplasty before 65F 55M? Not Asked   Social History Narrative     Not on file       Outpatient Encounter Medications as of 7/31/2019   Medication Sig Dispense Refill     FLUoxetine (PROZAC) 40 MG capsule Take 1 capsule (40 mg) by mouth daily 90 capsule 3     Multiple Vitamins-Minerals (MULTIVITAMIN ADULT PO) Take by mouth daily       Omega-3 Fatty Acids (FISH OIL) 1200 MG CAPS Take 1 capsule by mouth daily       UNABLE TO FIND MEDICATION NAME: calcium 1000 mg daily        Vitamin D, Cholecalciferol, 1000 UNITS CAPS Take 1 tablet by mouth 2 times daily       FLUoxetine (PROZAC) 20 MG capsule Take 1 capsule (20 mg) by mouth daily (Patient not taking: Reported on 7/31/2019) 90 capsule 1     No facility-administered encounter medications on file as of 7/31/2019.        Review Of Systems:  Skin: As above  Eyes: negative  Ears/Nose/Throat: negative  Respiratory: No shortness of breath, dyspnea on exertion, cough, or hemoptysis  Cardiovascular: negative  Gastrointestinal: negative  Genitourinary: negative  Musculoskeletal: negative  Neurologic: negative  Psychiatric: negative  Hematologic/Lymphatic/Immunologic: negative  Endocrine: negative      Objective:     /78   Pulse 61   LMP 01/18/2018   Eyes: Conjunctivae/lids: Normal   ENT: Lips:  Normal  MSK: Normal  Cardiovascular: Peripheral edema none  Pulm: Breathing Normal  Neuro/Psych: Orientation:  Normal; Mood/Affect: Normal, NAD, WDWN  Pt accompanied by: self  Following areas examined: face, left neck, left forearm, distal arm  Lepe skin type:ii   Findings:  Red smooth well-defined macules and papule on left lateral neck  Medium brown irregularly shaped macule with dark brown papule on left posterior arm 0.5cm  Pink scaly papule on right forearm x 1  Assessment and Plan:  1) Neoplasm of uncertain behavior on left posterior arm 0.5cm  ISK rule out MM  TANGENTIAL BIOPSY:  After consent, anesthesia with LEC and prep, tangential biopsy performed.  No complications and routine wound care.  May grow back and will get a scar. Based on lesion type may need to completely remove lesion. Patient will be notified in 7-10 days of results. Wound care directions given.    2) ak vs isk x 1 on right forearm  Disc possible etiologies  LN2: Treated with LN2 for 5s for 1-2 cycles. Warned risks of blistering, pain, pigment change, scarring, and incomplete resolution.  Advised patient to return if lesions do not completely resolve within 2-3 months.  Wound care sheet given.    3) cherry angiomas    Treatment of these lesions would be purely cosmetic and not medically neccessary.  Lesion may recur and/or may not completely resolve. May need additional treatment.  Different removal options including excision, cryotherapy, cautery and /or laser.      Signs and Symptoms of non-melanoma skin cancer and ABCDEs of melanoma reviewed with patient. Patient encouraged to perform monthly self skin exams and educated on how to perform them. UV precautions reviewed with patient. Patient was asked about new or changing moles/lesions on body.   Wear a sunscreen with at least SPF 30 on your face, ears, neck and V of the chest daily. Wear sunscreen on other areas of the body if those areas are exposed to the sun throughout the day. Sunscreens can contain physical and/or chemical blockers. Physical blockers are less likely to clog pores,  these include zinc oxide and titanium dioxide. Reapply every two hour and after swimming. Sunscreen examples include Neutrogena, CeraVe, Blue Lizard, Elta MD and many others.          Follow up in 2 months      Again, thank you for allowing me to participate in the care of your patient.        Sincerely,        Cate Gregg PA-C

## 2019-07-31 NOTE — PROGRESS NOTES
HPI:  Alison Chandler is a 54 year old female patient here today for growth on left arm .  Patient states this has been present for a while.  Patient reports the following symptoms: growing .  Patient reports the following previous treatments: none.  Patient reports the following modifying factors: none.  Associated symptoms: none.  Patient has no other skin complaints today.  Remainder of the HPI, Meds, PMH, Allergies, FH, and SH was reviewed in chart.    Pertinent Hx:   No personal or family history of skin cancer    History reviewed. No pertinent past medical history.    History reviewed. No pertinent surgical history.     Family History   Problem Relation Age of Onset     Diabetes Mother 50        type 2     Hypertension Mother      Hyperlipidemia Father        Social History     Socioeconomic History     Marital status:      Spouse name: Not on file     Number of children: 1     Years of education: Not on file     Highest education level: Bachelor's degree (e.g., BA, AB, BS)   Occupational History     Occupation:      Employer: OTHER     Comment: [employer]   Social Needs     Financial resource strain: Not hard at all     Food insecurity:     Worry: Never true     Inability: Never true     Transportation needs:     Medical: No     Non-medical: No   Tobacco Use     Smoking status: Never Smoker     Smokeless tobacco: Never Used   Substance and Sexual Activity     Alcohol use: Yes     Frequency: 2-4 times a month     Drinks per session: 1 or 2     Binge frequency: Never     Drug use: No     Sexual activity: Yes   Lifestyle     Physical activity:     Days per week: 1 day     Minutes per session: 30 min     Stress: Only a little   Relationships     Social connections:     Talks on phone: More than three times a week     Gets together: Twice a week     Attends Episcopalian service: Never     Active member of club or organization: No     Attends meetings of clubs or  organizations: Patient refused     Relationship status:      Intimate partner violence:     Fear of current or ex partner: Not on file     Emotionally abused: Not on file     Physically abused: Not on file     Forced sexual activity: Not on file   Other Topics Concern     Parent/sibling w/ CABG, MI or angioplasty before 65F 55M? Not Asked   Social History Narrative     Not on file       Outpatient Encounter Medications as of 7/31/2019   Medication Sig Dispense Refill     FLUoxetine (PROZAC) 40 MG capsule Take 1 capsule (40 mg) by mouth daily 90 capsule 3     Multiple Vitamins-Minerals (MULTIVITAMIN ADULT PO) Take by mouth daily       Omega-3 Fatty Acids (FISH OIL) 1200 MG CAPS Take 1 capsule by mouth daily       UNABLE TO FIND MEDICATION NAME: calcium 1000 mg daily        Vitamin D, Cholecalciferol, 1000 UNITS CAPS Take 1 tablet by mouth 2 times daily       FLUoxetine (PROZAC) 20 MG capsule Take 1 capsule (20 mg) by mouth daily (Patient not taking: Reported on 7/31/2019) 90 capsule 1     No facility-administered encounter medications on file as of 7/31/2019.        Review Of Systems:  Skin: As above  Eyes: negative  Ears/Nose/Throat: negative  Respiratory: No shortness of breath, dyspnea on exertion, cough, or hemoptysis  Cardiovascular: negative  Gastrointestinal: negative  Genitourinary: negative  Musculoskeletal: negative  Neurologic: negative  Psychiatric: negative  Hematologic/Lymphatic/Immunologic: negative  Endocrine: negative      Objective:     /78   Pulse 61   LMP 01/18/2018   Eyes: Conjunctivae/lids: Normal   ENT: Lips:  Normal  MSK: Normal  Cardiovascular: Peripheral edema none  Pulm: Breathing Normal  Neuro/Psych: Orientation: Normal; Mood/Affect: Normal, NAD, WDWN  Pt accompanied by: self  Following areas examined: face, left neck, left forearm, distal arm  Lepe skin type:ii   Findings:  Red smooth well-defined macules and papule on left lateral neck  Medium brown irregularly  shaped macule with dark brown papule on left posterior arm 0.5cm  Pink scaly papule on right forearm x 1  Assessment and Plan:  1) Neoplasm of uncertain behavior on left posterior arm 0.5cm  ISK rule out MM  TANGENTIAL BIOPSY:  After consent, anesthesia with LEC and prep, tangential biopsy performed.  No complications and routine wound care.  May grow back and will get a scar. Based on lesion type may need to completely remove lesion. Patient will be notified in 7-10 days of results. Wound care directions given.    2) ak vs isk x 1 on right forearm  Disc possible etiologies  LN2: Treated with LN2 for 5s for 1-2 cycles. Warned risks of blistering, pain, pigment change, scarring, and incomplete resolution.  Advised patient to return if lesions do not completely resolve within 2-3 months.  Wound care sheet given.    3) cherry angiomas    Treatment of these lesions would be purely cosmetic and not medically neccessary.  Lesion may recur and/or may not completely resolve. May need additional treatment.  Different removal options including excision, cryotherapy, cautery and /or laser.      Signs and Symptoms of non-melanoma skin cancer and ABCDEs of melanoma reviewed with patient. Patient encouraged to perform monthly self skin exams and educated on how to perform them. UV precautions reviewed with patient. Patient was asked about new or changing moles/lesions on body.   Wear a sunscreen with at least SPF 30 on your face, ears, neck and V of the chest daily. Wear sunscreen on other areas of the body if those areas are exposed to the sun throughout the day. Sunscreens can contain physical and/or chemical blockers. Physical blockers are less likely to clog pores, these include zinc oxide and titanium dioxide. Reapply every two hour and after swimming. Sunscreen examples include Neutrogena, CeraVe, Blue Lizard, Elta MD and many others.          Follow up in 2 months

## 2019-07-31 NOTE — PATIENT INSTRUCTIONS
Wound Care Instructions     FOR SUPERFICIAL WOUNDS     [facility] [phone]    [facility] [phone]          AFTER 24 HOURS YOU SHOULD REMOVE THE BANDAGE AND BEGIN DAILY DRESSING CHANGES AS FOLLOWS:     1) Remove Dressing.     2) Clean and dry the area with tap water using a Q-tip or sterile gauze pad.     3) Apply Vaseline, Aquaphor, Polysporin ointment or Bacitracin ointment over entire wound.  Do NOT use Neosporin ointment.     4) Cover the wound with a band-aid, or a sterile non-stick gauze pad and micropore paper tape      REPEAT THESE INSTRUCTIONS AT LEAST ONCE A DAY UNTIL THE WOUND HAS COMPLETELY HEALED.    It is an old wives tale that a wound heals better when it is exposed to air and allowed to dry out. The wound will heal faster with a better cosmetic result if it is kept moist with ointment and covered with a bandage.    **Do not let the wound dry out.**      Supplies Needed:      *Cotton tipped applicators (Q-tips)    *Polysporin Ointment or Bacitracin Ointment (NOT NEOSPORIN)    *Band-aids or non-stick gauze pads and micropore paper tape.      PATIENT INFORMATION:    During the healing process you will notice a number of changes. All wounds develop a small halo of redness surrounding the wound.  This means healing is occurring. Severe itching with extensive redness usually indicates sensitivity to the ointment or bandage tape used to dress the wound.  You should call our office if this develops.      Swelling  and/or discoloration around your surgical site is common, particularly when performed around the eye.    All wounds normally drain.  The larger the wound the more drainage there will be.  After 7-10 days, you will notice the wound beginning to shrink and new skin will begin to grow.  The wound is healed when you can see skin has formed over the entire area.  A healed wound has a healthy, shiny look to the surface and is red to dark pink in color to  normalize.  Wounds may take approximately 4-6 weeks to heal.  Larger wounds may take 6-8 weeks.  After the wound is healed you may discontinue dressing changes.    You may experience a sensation of tightness as your wound heals. This is normal and will gradually subside.    Your healed wound may be sensitive to temperature changes. This sensitivity improves with time, but if you re having a lot of discomfort, try to avoid temperature extremes.    Patients frequently experience itching after their wound appears to have healed because of the continue healing under the skin.  Plain Vaseline will help relieve the itching.        POSSIBLE COMPLICATIONS    BLEEDIN. Leave the bandage in place.  2. Use tightly rolled up gauze or a cloth to apply direct pressure over the bandage for 30  minutes.  3. Reapply pressure for an additional 30 minutes if necessary  4. Use additional gauze and tape to maintain pressure once the bleeding has stopped.      WOUND CARE INSTRUCTIONS  FOR CRYOSURGERY        This area treated with liquid nitrogen will form a blister. You do not need to bandage the area until after the blister forms and breaks (which may be a few days).  When the blister breaks, begin daily dressing changes as follows:    1) Clean and dry the area with tap water using clean Q-tip or sterile gauze pad.    2) Apply Polysporin ointment or Bacitracin ointment over entire wound.  Do NOT use Neosporin ointment.    3) Cover the wound with a band-aid or sterile non-stick gauze pad and micropore paper tape.      REPEAT THESE INSTRUCTIONS AT LEAST ONCE A DAY UNTIL THE WOUND HAS COMPLETELY HEALED.        It is an old wives tale that a wound heals better when it is exposed to air and allowed to dry out. The wound will heal faster with a better cosmetic result if it is kept moist with ointment and covered with a bandage.  Do not let the wound dry out.      Supplies Needed:     *Cotton tipped applicators (Q-tips)   *Polysporin  ointment or Bacitracin ointment (NOT NEOSPORIN)   *Band-aids, or non stick gauze pads and micropore paper tape    PATIENT INFORMATION    During the healing process you will notice a number of changes. All wounds develop a small halo of redness surrounding the wound.  This means healing is occurring. Severe itching with extensive redness usually indicates sensitivity to the ointment or bandage tape used to dress the wound.  You should call our office if this develops.      Swelling and/or discoloration around your surgical site is common, particularly when performed around the eye.    All wounds normally drain.  The larger the wound the more drainage there will be.  After 7-10 days, you will notice the wound beginning to shrink and new skin will begin to grow.  The wound is healed when you can see skin has formed over the entire area.  A healed wound has a healthy, shiny look to the surface and is red to dark pink in color to normalize.  Wounds may take approximately 4-6 weeks to heal.  Larger wounds may take 6-8 weeks.  After the wound is healed you may discontinue dressing changes.    You may experience a sensation of tightness as your wound heals. This is normal and will gradually subside.    Your healed wound may be sensitive to temperature changes. This sensitivity improves with time, but if you re having a lot of discomfort, try to avoid temperature extremes.    Patients frequently experience itching after their wound appears to have healed because of the continue healing under the skin.  Plain Vaseline will help relieve the itching.             Proper skin care from [facility]:    -Eliminate harsh soaps as they strip the natural oils from the skin, often resulting in dry itchy skin ( i.e. Dial, Zest, Guerline Spring)  -Use mild soaps such as Cetaphil or Dove Sensitive Skin in the shower. You do not need to use soap on arms, legs, and trunk every time you shower unless visibly soiled.   -Avoid hot or  cold showers.  -After showering, lightly dry off and apply moisturizing within 2-3 minutes. This will help trap moisture in the skin.   -Aggressive use of a moisturizer at least 1-2 times a day to the entire body (including -Vanicream, Cetaphil, Aquaphor or Cerave) and moisturize hands after every washing.  -We recommend using moisturizers that come in a tub that needs to be scooped out, not a pump. This has more of an oil base. It will hold moisture in your skin much better than a water base moisturizer. The above recommended are non-pore clogging.      Wear a sunscreen with at least SPF 30 on your face, ears, neck and V of the chest daily. Wear sunscreen on other areas of the body if those areas are exposed to the sun throughout the day. Sunscreens can contain physical and/or chemical blockers. Physical blockers are less likely to clog pores, these include zinc oxide and titanium dioxide. Reapply every two hour and after swimming. Sunscreen examples include Neutrogena, CeraVe, Blue Lizard, Elta MD and many others.    UV radiation  UVA radiation remains constant throughout the day and throughout the year. It is a longer wavelength than UVB and therefore penetrates deeper into the skin leading to immediate and delayed tanning, photoaging, and skin cancer. 70-80% of UVA and UVB radiation occurs between the hours of 10am-2pm.  UVB radiation  UVB radiation causes the most harmful effects and is more significant during the summer months. However, snow and ice can reflect UVB radiation leading to skin damage during the winter months as well. UVB radiation is responsible for tanning, burning, inflammation, delayed erythema (pinkness), pigmentation (brown spots), and skin cancer.

## 2019-08-02 LAB — COPATH REPORT: NORMAL

## 2019-08-05 ENCOUNTER — TELEPHONE (OUTPATIENT)
Dept: DERMATOLOGY | Facility: CLINIC | Age: 54
End: 2019-08-05

## 2019-08-05 NOTE — TELEPHONE ENCOUNTER
----- Message from Cate Gregg PA-C sent at 8/5/2019  8:51 AM CDT -----  Shave, left posterior arm:   - Malignant melanoma     Spoke to pt and gave results  Please schedule MM mohs

## 2019-08-05 NOTE — TELEPHONE ENCOUNTER
Called and spoke to patient. Patient notified of Dx by provider- malignant melanoma. Scheduled following day mohs appointment in [place]. Educated patient on mohs procedure/process. Patient voiced understanding.    Marin RN-BSN  [facility]  [phone]

## 2019-08-06 ENCOUNTER — OFFICE VISIT (OUTPATIENT)
Dept: DERMATOLOGY | Facility: CLINIC | Age: 54
End: 2019-08-06
Payer: COMMERCIAL

## 2019-08-06 VITALS — OXYGEN SATURATION: 96 % | SYSTOLIC BLOOD PRESSURE: 135 MMHG | HEART RATE: 72 BPM | DIASTOLIC BLOOD PRESSURE: 83 MMHG

## 2019-08-06 DIAGNOSIS — D03.62 MELANOMA IN SITU OF LEFT UPPER ARM (H): Primary | ICD-10-CM

## 2019-08-06 PROCEDURE — 13121 CMPLX RPR S/A/L 2.6-7.5 CM: CPT | Performed by: DERMATOLOGY

## 2019-08-06 PROCEDURE — 88342 IMHCHEM/IMCYTCHM 1ST ANTB: CPT | Mod: 59 | Performed by: DERMATOLOGY

## 2019-08-06 PROCEDURE — 17313 MOHS 1 STAGE T/A/L: CPT | Performed by: DERMATOLOGY

## 2019-08-06 PROCEDURE — 88341 IMHCHEM/IMCYTCHM EA ADD ANTB: CPT | Performed by: DERMATOLOGY

## 2019-08-06 NOTE — PROGRESS NOTES
Alison Chandler is a 54 year old year old female patient here today for evaluation and managment of melanoma 0.,7mm non uklcerate don left post arm.   .  Patient states this has been present for one year.  Patient reports the following symptoms:  chanbging.  Patient reports the following previous treatments none.  Patient reports the following modifying factors none.  Associated symptoms: none.  Patient has no other skin complaints today.  Remainder of the HPI, Meds, PMH, Allergies, FH, and SH was reviewed in chart.      Past Medical History:   Diagnosis Date     Malignant melanoma (H)        History reviewed. No pertinent surgical history.     Family History   Problem Relation Age of Onset     Diabetes Mother 50        type 2     Hypertension Mother      Hyperlipidemia Father      Melanoma No family hx of        Social History     Socioeconomic History     Marital status:      Spouse name: Not on file     Number of children: 1     Years of education: Not on file     Highest education level: Bachelor's degree (e.g., BA, AB, BS)   Occupational History     Occupation:      Employer: OTHER     Comment: [employer]   Social Needs     Financial resource strain: Not hard at all     Food insecurity:     Worry: Never true     Inability: Never true     Transportation needs:     Medical: No     Non-medical: No   Tobacco Use     Smoking status: Never Smoker     Smokeless tobacco: Never Used   Substance and Sexual Activity     Alcohol use: Yes     Frequency: 2-4 times a month     Drinks per session: 1 or 2     Binge frequency: Never     Drug use: No     Sexual activity: Yes   Lifestyle     Physical activity:     Days per week: 1 day     Minutes per session: 30 min     Stress: Only a little   Relationships     Social connections:     Talks on phone: More than three times a week     Gets together: Twice a week     Attends Jain service: Never     Active member of club or organization:  No     Attends meetings of clubs or organizations: Patient refused     Relationship status:      Intimate partner violence:     Fear of current or ex partner: Not on file     Emotionally abused: Not on file     Physically abused: Not on file     Forced sexual activity: Not on file   Other Topics Concern     Parent/sibling w/ CABG, MI or angioplasty before 65F 55M? Not Asked   Social History Narrative     Not on file       Outpatient Encounter Medications as of 8/6/2019   Medication Sig Dispense Refill     FLUoxetine (PROZAC) 40 MG capsule Take 1 capsule (40 mg) by mouth daily 90 capsule 3     Multiple Vitamins-Minerals (MULTIVITAMIN ADULT PO) Take by mouth daily       Omega-3 Fatty Acids (FISH OIL) 1200 MG CAPS Take 1 capsule by mouth daily       UNABLE TO FIND MEDICATION NAME: calcium 1000 mg daily        Vitamin D, Cholecalciferol, 1000 UNITS CAPS Take 1 tablet by mouth 2 times daily       FLUoxetine (PROZAC) 20 MG capsule Take 1 capsule (20 mg) by mouth daily (Patient not taking: Reported on 7/31/2019) 90 capsule 1     No facility-administered encounter medications on file as of 8/6/2019.              Review Of Systems  Skin: As above  Eyes: negative  Ears/Nose/Throat: negative  Respiratory: No shortness of breath, dyspnea on exertion, cough, or hemoptysis  Cardiovascular: negative  Gastrointestinal: negative  Genitourinary: negative  Musculoskeletal: negative  Neurologic: negative  Psychiatric: negative  Hematologic/Lymphatic/Immunologic: negative  Endocrine: negative      O:   NAD, WDWN, Alert & Oriented, Mood & Affect wnl, Vitals stable   Here today with usband    /83   Pulse 72   LMP 01/18/2018   SpO2 96%    General appearance normal   Vitals stable   Alert, oriented and in no acute distress      Following lymph nodes palpated: Occipital, Cervical, Supraclavicular , axilla no lad     L post arm 1cm ulcerated bx site      Eyes: Conjunctivae/lids:Normal     ENT: Lips, buccal mucosa, tongue:  normal    MSK:Normal    Cardiovascular: peripheral edema none    Pulm: Breathing Normal    Lymph Nodes: No Head and Neck Lymphadenopathy     Neuro/Psych: Orientation:Normal; Mood/Affect:Normal      A/P:  1. L post arm melanoma 0.7mm  [place] discussed with patient   [place] sent out today  MELANOMA DISCUSSED WITH PATIENT:  I discussed the specifics of tumor, prognosis, metachronous melanoma, self exam, and genetics with the patient. I explained the need for monthly skin exams including and taught the patient how to do this. Patient was asked about new or changing moles . I discussed with patient signs and symptoms that could arise in the setting of recurrent locoregional or metastatic disease. In addition, the need to undergo every 4 month dermatologic full skin survey and evaluation given that patients with a diagnosis of melanoma are at risk of recurrence (local and distant) and of subsequent de kenney melanoma.  . I reviewed treatment options, including a discussion of wide excision (the gold standard) versus Mohs surgery with MART-1 immunostains.     Note: MART-1 (Melanoma Antigen Recognized by T-cells) antibody immunostaining was used during Mohs surgery as per standard protocol, in addition to routine processing of all specimens with hematoxylin and eosin. The peripheral margins/edges were processed with the MART-1 stain (4 specimens total). The center was examined with hematoxylin & eosin and MART-1 immunostains. The patient was informed of the procedure and its risk/benefits during the consent for the procedure.    One or more of the reagents used in immunohistochemical testing in this case may not have been cleared or approved by the U.S. Food and Drug Administration (FDA). The FDA has determined that such clearance or approval is not necessary. These tests are used for clinical purposes. They should not be regarded as investigational or for research. These reagents  performance characteristics have been  determined by [facility]. This laboratory is certified under the Clinical Laboratory Improvement Amendments of 1988 (CLIA-88) as qualified to perform high complexity clinical laboratory testing.    After [facility] discussed with patient, decision for Mohs surgery was made. Indication for Mohs was aggressive histology. Patient confirmed the site with Dr. Swartz. After anesthesia with LEC, the tumor was excised using standard Mohs technique in 1 stages(s).  MART 1 stains were performed on 4 specimens. CLEAR MARGINS OBTAINED and Final defect size was 1.7 x 2.1 cm.     REPAIR COMPLEX: Because of the tightness of the surrounding skin and Because of the size and full thickness nature of the defect, a complex closure was planned. After LEC anesthesia and prep, Burow's triangles were excised in the relaxed skin tension lines. The wound edges were widely undermined by dissection in the subcutaneous plane until adequate tissue mobility was obtained. Hemostasis was obtained. The wound edges were closed in a layered fashion using Vicryl and Fast Absorbing Plain Gut sutures. Postoperative length was 4.8 cm.   EBL minimal; complications none; wound care routine.  The patient was discharged in good condition and will return in one week for wound evaluation.  BENIGN LESIONS DISCUSSED WITH PATIENT:  I discussed the specifics of tumor, prognosis, and genetics of benign lesions.  I explained that treatment of these lesions would be purely cosmetic and not medically neccessary.  I discussed with patient different removal options including excision, cautery and /or laser.      Nature and genetics of benign skin lesions dicussed with patient.  Signs and Symptoms of skin cancer discussed with patient.  ABCDEs of melanoma reviewed with patient.  Patient encouraged to perform monthly skin exams.  UV precautions reviewed with patient.  Skin care regimen reviewed with patient: Eliminate harsh soaps, i.e. Dial, zest, irsih  spring; Mild soaps such as Cetaphil or Dove sensitive skin, avoid hot or cold showers, aggressive use of emollients including vanicream, cetaphil or cerave discussed with patient.    Risks of non-melanoma skin cancer discussed with patient   Return to clinic 4 months

## 2019-08-06 NOTE — PATIENT INSTRUCTIONS
Sutured Wound Care     [facility]: [phone]    [facility]: [phone]          ? No strenuous activity for 48 hours. Resume moderate activity in 48 hours. No heavy exercising until you are seen for follow up in one week.     ? Take Tylenol as needed for discomfort.                         ? Do not drink alcoholic beverages for 48 hours.     ? Keep the pressure bandage in place for 24 hours. If the bandage becomes blood tinged or loose, reinforce it with gauze and tape.        (Refer to the reverse side of this page for management of bleeding).    ? Remove pressure bandage in 24 hours     ? Leave the flat bandage in place until your follow up appointment.    ? Keep the bandage dry. Wash around it carefully.    ? If the tape becomes soiled or starts to come off, reinforce it with additional paper tape.    ? Do not smoke for 3 weeks; smoking is detrimental to wound healing.    ? It is normal to have swelling and bruising around the surgical site. The bruising will fade in approximately 10-14 days. Elevate the area to reduce swelling.    ? Numbness, itchiness and sensitivity to temperature changes can occur after surgery and may take up to 18 months to normalize.      POSSIBLE COMPLICATIONS    BLEEDIN. Leave the bandage in place.  2. Use tightly rolled up gauze or a cloth to apply direct pressure over the bandage for 20   minutes.  3. Reapply pressure for an additional 20 minutes if necessary  4. Call the office or go to the nearest emergency room if pressure fails to stop the bleeding.  5. Use additional gauze and tape to maintain pressure once the bleeding has stopped.        PAIN:    1. Post operative pain should slowly get better, never worse.  2. A severe increase in pain may indicate a problem. Call the office if this occurs.    In case of emergency phone:Dr Swartz [phone]

## 2019-08-06 NOTE — PROGRESS NOTES
Surgical Office Location :   [facility]  [address] [place] [address]

## 2019-08-06 NOTE — NURSING NOTE
"Initial /83   Pulse 72   LMP 01/18/2018   SpO2 96%  Estimated body mass index is 34.42 kg/m  as calculated from the following:    Height as of 5/28/19: 1.81 m (5' 11.26\").    Weight as of 5/28/19: 112.8 kg (248 lb 9.6 oz). .    Janette Echeverria LPN    "

## 2019-08-06 NOTE — LETTER
8/6/2019         RE: Alison Chandler  [address] [facility] [address]        Dear Colleague,    Thank you for referring your patient, Alison Chandler, to the [facility]. Please see a copy of my visit note below.    Surgical Office Location :   [facility]  [address] [place] [address]      Alison Chandler is a 54 year old year old female patient here today for evaluation and managment of melanoma 0.,7mm non uklcerate don left post arm.   .  Patient states this has been present for one year.  Patient reports the following symptoms:  chanbging.  Patient reports the following previous treatments none.  Patient reports the following modifying factors none.  Associated symptoms: none.  Patient has no other skin complaints today.  Remainder of the HPI, Meds, PMH, Allergies, FH, and SH was reviewed in chart.      Past Medical History:   Diagnosis Date     Malignant melanoma (H)        History reviewed. No pertinent surgical history.     Family History   Problem Relation Age of Onset     Diabetes Mother 50        type 2     Hypertension Mother      Hyperlipidemia Father      Melanoma No family hx of        Social History     Socioeconomic History     Marital status:      Spouse name: Not on file     Number of children: 1     Years of education: Not on file     Highest education level: Bachelor's degree (e.g., BA, AB, BS)   Occupational History     Occupation:      Employer: OTHER     Comment: [employer]   Social Needs     Financial resource strain: Not hard at all     Food insecurity:     Worry: Never true     Inability: Never true     Transportation needs:     Medical: No     Non-medical: No   Tobacco Use     Smoking status: Never Smoker     Smokeless tobacco: Never Used   Substance and Sexual Activity     Alcohol use: Yes     Frequency: 2-4 times a month     Drinks per session: 1 or 2     Binge frequency:  Never     Drug use: No     Sexual activity: Yes   Lifestyle     Physical activity:     Days per week: 1 day     Minutes per session: 30 min     Stress: Only a little   Relationships     Social connections:     Talks on phone: More than three times a week     Gets together: Twice a week     Attends Sabianist service: Never     Active member of club or organization: No     Attends meetings of clubs or organizations: Patient refused     Relationship status:      Intimate partner violence:     Fear of current or ex partner: Not on file     Emotionally abused: Not on file     Physically abused: Not on file     Forced sexual activity: Not on file   Other Topics Concern     Parent/sibling w/ CABG, MI or angioplasty before 65F 55M? Not Asked   Social History Narrative     Not on file       Outpatient Encounter Medications as of 8/6/2019   Medication Sig Dispense Refill     FLUoxetine (PROZAC) 40 MG capsule Take 1 capsule (40 mg) by mouth daily 90 capsule 3     Multiple Vitamins-Minerals (MULTIVITAMIN ADULT PO) Take by mouth daily       Omega-3 Fatty Acids (FISH OIL) 1200 MG CAPS Take 1 capsule by mouth daily       UNABLE TO FIND MEDICATION NAME: calcium 1000 mg daily        Vitamin D, Cholecalciferol, 1000 UNITS CAPS Take 1 tablet by mouth 2 times daily       FLUoxetine (PROZAC) 20 MG capsule Take 1 capsule (20 mg) by mouth daily (Patient not taking: Reported on 7/31/2019) 90 capsule 1     No facility-administered encounter medications on file as of 8/6/2019.              Review Of Systems  Skin: As above  Eyes: negative  Ears/Nose/Throat: negative  Respiratory: No shortness of breath, dyspnea on exertion, cough, or hemoptysis  Cardiovascular: negative  Gastrointestinal: negative  Genitourinary: negative  Musculoskeletal: negative  Neurologic: negative  Psychiatric: negative  Hematologic/Lymphatic/Immunologic: negative  Endocrine: negative      O:   NAD, WDWN, Alert & Oriented, Mood & Affect wnl, Vitals stable   Here  today with usband    /83   Pulse 72   LMP 01/18/2018   SpO2 96%    General appearance normal   Vitals stable   Alert, oriented and in no acute distress      Following lymph nodes palpated: Occipital, Cervical, Supraclavicular , axilla no lad     L post arm 1cm ulcerated bx site      Eyes: Conjunctivae/lids:Normal     ENT: Lips, buccal mucosa, tongue: normal    MSK:Normal    Cardiovascular: peripheral edema none    Pulm: Breathing Normal    Lymph Nodes: No Head and Neck Lymphadenopathy     Neuro/Psych: Orientation:Normal; Mood/Affect:Normal      A/P:  1. L post arm melanoma 0.7mm  [place] discussed with patient   [place] sent out today  MELANOMA DISCUSSED WITH PATIENT:  I discussed the specifics of tumor, prognosis, metachronous melanoma, self exam, and genetics with the patient. I explained the need for monthly skin exams including and taught the patient how to do this. Patient was asked about new or changing moles . I discussed with patient signs and symptoms that could arise in the setting of recurrent locoregional or metastatic disease. In addition, the need to undergo every 4 month dermatologic full skin survey and evaluation given that patients with a diagnosis of melanoma are at risk of recurrence (local and distant) and of subsequent de kenney melanoma.  . I reviewed treatment options, including a discussion of wide excision (the gold standard) versus Mohs surgery with MART-1 immunostains.     Note: MART-1 (Melanoma Antigen Recognized by T-cells) antibody immunostaining was used during Mohs surgery as per standard protocol, in addition to routine processing of all specimens with hematoxylin and eosin. The peripheral margins/edges were processed with the MART-1 stain (4 specimens total). The center was examined with hematoxylin & eosin and MART-1 immunostains. The patient was informed of the procedure and its risk/benefits during the consent for the procedure.    One or more of the reagents used in  immunohistochemical testing in this case may not have been cleared or approved by the U.S. Food and Drug Administration (FDA). The FDA has determined that such clearance or approval is not necessary. These tests are used for clinical purposes. They should not be regarded as investigational or for research. These reagents  performance characteristics have been determined by [facility]. This laboratory is certified under the Clinical Laboratory Improvement Amendments of 1988 (CLIA-88) as qualified to perform high complexity clinical laboratory testing.    [facility], decision for Mohs surgery was made. Indication for Mohs was aggressive histology. Patient confirmed the site with Dr. Swartz. After anesthesia with LEC, the tumor was excised using standard Mohs technique in 1 stages(s).  MART 1 stains were performed on 4 specimens. CLEAR MARGINS OBTAINED and Final defect size was 1.7 x 2.1 cm.     REPAIR COMPLEX: Because of the tightness of the surrounding skin and Because of the size and full thickness nature of the defect, a complex closure was planned. After LEC anesthesia and prep, Burow's triangles were excised in the relaxed skin tension lines. The wound edges were widely undermined by dissection in the subcutaneous plane until adequate tissue mobility was obtained. Hemostasis was obtained. The wound edges were closed in a layered fashion using Vicryl and Fast Absorbing Plain Gut sutures. Postoperative length was 4.8 cm.   EBL minimal; complications none; wound care routine.  The patient was discharged in good condition and will return in one week for wound evaluation.  BENIGN LESIONS DISCUSSED WITH PATIENT:  I discussed the specifics of tumor, prognosis, and genetics of benign lesions.  I explained that treatment of these lesions would be purely cosmetic and not medically neccessary.  I discussed with patient different removal options including excision, cautery and /or  laser.      Nature and genetics of benign skin lesions dicussed with patient.  Signs and Symptoms of skin cancer discussed with patient.  ABCDEs of melanoma reviewed with patient.  Patient encouraged to perform monthly skin exams.  UV precautions reviewed with patient.  Skin care regimen reviewed with patient: Eliminate harsh soaps, i.e. Dial, zest, irsih spring; Mild soaps such as Cetaphil or Dove sensitive skin, avoid hot or cold showers, aggressive use of emollients including vanicream, cetaphil or cerave discussed with patient.    Risks of non-melanoma skin cancer discussed with patient   Return to clinic 4 months      Again, thank you for allowing me to participate in the care of your patient.        Sincerely,        Jaleel Swartz MD

## 2019-08-14 ENCOUNTER — ALLIED HEALTH/NURSE VISIT (OUTPATIENT)
Dept: DERMATOLOGY | Facility: CLINIC | Age: 54
End: 2019-08-14
Payer: COMMERCIAL

## 2019-08-14 DIAGNOSIS — Z48.01 ENCOUNTER FOR CHANGE OR REMOVAL OF SURGICAL WOUND DRESSING: Primary | ICD-10-CM

## 2019-08-14 PROCEDURE — 99207 ZZC NO CHARGE NURSE ONLY: CPT

## 2019-08-14 NOTE — NURSING NOTE
Pt returned to clinic for post surgery 1 week follow up bandage change. Pt has no complaints, denies pain. Bandage removed from left posterior arm, area cleansed with normal saline. Site is healing and wound edges approximating well. Reapplied new steri strips and paper tape.    Advised to watch for signs/sx of infection; spreading redness, drainage, odor, fever. Call or report promptly to clinic. Pt given written instructions and informed to rtc as needed. Patient verbalized understanding.     LYNDSEY Funes-BSN-PHN  [facility]  [phone]

## 2019-08-14 NOTE — PATIENT INSTRUCTIONS

## 2019-08-15 LAB — LAB SCANNED RESULT: NORMAL

## 2019-08-19 ENCOUNTER — TELEPHONE (OUTPATIENT)
Dept: DERMATOLOGY | Facility: CLINIC | Age: 54
End: 2019-08-19

## 2019-08-19 NOTE — TELEPHONE ENCOUNTER
----- Message from Jaleel Swartz MD sent at 8/19/2019  7:21 AM CDT -----  Your melanoma test showed that your melanoma was a low risk Class 1 score in which the 5-year metastatic free survival rate was determined to be 97%.

## 2019-08-20 LAB — LAB SCANNED RESULT: NORMAL

## 2019-08-23 ENCOUNTER — TELEPHONE (OUTPATIENT)
Dept: DERMATOLOGY | Facility: CLINIC | Age: 54
End: 2019-08-23

## 2019-08-23 NOTE — TELEPHONE ENCOUNTER
Reason for Call:  Form, our goal is to have forms completed with 72 hours, however, some forms may require a visit or additional information.    Type of letter, form or note:  medical    Who is the form from?: Insurance comp    Where did the form come from: form was faxed in    What clinic location was the form placed at?: [facility]    Where the form was placed: Given to MA/RN    What number is listed as a contact on the form?: [phone]       Additional comments: Fax information to: [phone]    Call taken on 8/23/2019 at 1:43 PM by Denise Behrendt

## 2019-09-05 ENCOUNTER — OFFICE VISIT (OUTPATIENT)
Dept: DERMATOLOGY | Facility: CLINIC | Age: 54
End: 2019-09-05
Payer: COMMERCIAL

## 2019-09-05 VITALS — SYSTOLIC BLOOD PRESSURE: 129 MMHG | HEIGHT: 72 IN | DIASTOLIC BLOOD PRESSURE: 82 MMHG | BODY MASS INDEX: 33.72 KG/M2

## 2019-09-05 DIAGNOSIS — L81.4 LENTIGO: ICD-10-CM

## 2019-09-05 DIAGNOSIS — D18.01 ANGIOMA OF SKIN: ICD-10-CM

## 2019-09-05 DIAGNOSIS — L82.1 SEBORRHEIC KERATOSIS: ICD-10-CM

## 2019-09-05 DIAGNOSIS — Z85.820 HISTORY OF MELANOMA: Primary | ICD-10-CM

## 2019-09-05 DIAGNOSIS — D23.9 DERMAL NEVUS: ICD-10-CM

## 2019-09-05 PROCEDURE — 99213 OFFICE O/P EST LOW 20 MIN: CPT | Performed by: DERMATOLOGY

## 2019-09-05 NOTE — LETTER
9/5/2019         RE: Alison Chandler  [address] [facility] [address]        Dear Colleague,    Thank you for referring your patient, Alison Chandler, to the [facility]. Please see a copy of my visit note below.    Alison Chandler is a 54 year old year old female patient here today for hx of 0.7mm melanoma on left arm.  She notes spot on rbeast and left arm and r calf.   .  Patient states this has been present for ?.  Patient reports the following symptoms:  Brown .  Patient reports the following previous treatments none.  Patient reports the following modifying factors none.  Associated symptoms: none.  Patient has no other skin complaints today.  Remainder of the HPI, Meds, PMH, Allergies, FH, and SH was reviewed in chart.      Past Medical History:   Diagnosis Date     Malignant melanoma (H)        No past surgical history on file.     Family History   Problem Relation Age of Onset     Diabetes Mother 50        type 2     Hypertension Mother      Hyperlipidemia Father      Melanoma No family hx of        Social History     Socioeconomic History     Marital status:      Spouse name: Not on file     Number of children: 1     Years of education: Not on file     Highest education level: Bachelor's degree (e.g., BA, AB, BS)   Occupational History     Occupation:      Employer: OTHER     Comment: [employer]   Social Needs     Financial resource strain: Not hard at all     Food insecurity:     Worry: Never true     Inability: Never true     Transportation needs:     Medical: No     Non-medical: No   Tobacco Use     Smoking status: Never Smoker     Smokeless tobacco: Never Used   Substance and Sexual Activity     Alcohol use: Yes     Frequency: 2-4 times a month     Drinks per session: 1 or 2     Binge frequency: Never     Drug use: No     Sexual activity: Yes   Lifestyle     Physical activity:     Days per week: 1 day      Minutes per session: 30 min     Stress: Only a little   Relationships     Social connections:     Talks on phone: More than three times a week     Gets together: Twice a week     Attends Uatsdin service: Never     Active member of club or organization: No     Attends meetings of clubs or organizations: Patient refused     Relationship status:      Intimate partner violence:     Fear of current or ex partner: Not on file     Emotionally abused: Not on file     Physically abused: Not on file     Forced sexual activity: Not on file   Other Topics Concern     Parent/sibling w/ CABG, MI or angioplasty before 65F 55M? Not Asked   Social History Narrative     Not on file       Outpatient Encounter Medications as of 9/5/2019   Medication Sig Dispense Refill     FLUoxetine (PROZAC) 20 MG capsule Take 1 capsule (20 mg) by mouth daily (Patient not taking: Reported on 7/31/2019) 90 capsule 1     FLUoxetine (PROZAC) 40 MG capsule Take 1 capsule (40 mg) by mouth daily 90 capsule 3     Multiple Vitamins-Minerals (MULTIVITAMIN ADULT PO) Take by mouth daily       Omega-3 Fatty Acids (FISH OIL) 1200 MG CAPS Take 1 capsule by mouth daily       UNABLE TO FIND MEDICATION NAME: calcium 1000 mg daily        Vitamin D, Cholecalciferol, 1000 UNITS CAPS Take 1 tablet by mouth 2 times daily       No facility-administered encounter medications on file as of 9/5/2019.              Review Of Systems  Skin: As above  Eyes: negative  Ears/Nose/Throat: negative  Respiratory: No shortness of breath, dyspnea on exertion, cough, or hemoptysis  Cardiovascular: negative  Gastrointestinal: negative  Genitourinary: negative  Musculoskeletal: negative  Neurologic: negative  Psychiatric: negative  Hematologic/Lymphatic/Immunologic: negative  Endocrine: negative      O:   NAD, WDWN, Alert & Oriented, Mood & Affect wnl, Vitals stable   Here today alone   /82   Ht 1.829 m (6')   LMP 01/18/2018   BMI 33.72 kg/m      General appearance  normal   Vitals stable   Alert, oriented and in no acute distress      Following lymph nodes palpated: Occipital, Cervical, Supraclavicular , axilla no lad   L arm well healed   Breast, r calf, l arm stuck on papule      Stuck on papules and brown macules on trunk and ext   Red papules on trunk  Flesh colored papules on trunk     The remainder of the full exam was unremarkable; the following areas were examined:  conjunctiva/lids, oral mucosa, neck, peripheral vascular system, abdomen, lymph nodes, digits/nails, eccrine and apocrine glands, scalp/hair, face, neck, chest, abdomen, buttocks, back, RUE, LUE, RLE, LLE       Eyes: Conjunctivae/lids:Normal     ENT: Lips, buccal mucosa, tongue: normal    MSK:Normal    Cardiovascular: peripheral edema none    Pulm: Breathing Normal    Lymph Nodes: No Head and Neck Lymphadenopathy     Neuro/Psych: Orientation:Normal; Mood/Affect:Normal      A/P:  1. Seborrheic keratosis, lentigo, angioma, dermal nevus, hx of melanoma  MELANOMA DISCUSSED WITH PATIENT:  I discussed the specifics of tumor, prognosis, metachronous melanoma, self exam, and genetics with the patient. I explained the need for monthly skin exams including and taught the patient how to do this. Patient was asked about new or changing moles . I discussed with patient signs and symptoms that could arise in the setting of recurrent locoregional or metastatic disease. In addition, the need to undergo every 4 month dermatologic full skin survey and evaluation given that patients with a diagnosis of melanoma are at risk of recurrence (local and distant) and of subsequent de kenney melanoma.      BENIGN LESIONS DISCUSSED WITH PATIENT:  I discussed the specifics of tumor, prognosis, and genetics of benign lesions.  I explained that treatment of these lesions would be purely cosmetic and not medically neccessary.  I discussed with patient different removal options including excision, cautery and /or laser.      Nature and  genetics of benign skin lesions dicussed with patient.  Signs and Symptoms of skin cancer discussed with patient.  ABCDEs of melanoma reviewed with patient.  Patient encouraged to perform monthly skin exams.  UV precautions reviewed with patient.  Skin care regimen reviewed with patient: Eliminate harsh soaps, i.e. Dial, zest, irsih spring; Mild soaps such as Cetaphil or Dove sensitive skin, avoid hot or cold showers, aggressive use of emollients including vanicream, cetaphil or cerave discussed with patient.    Risks of non-melanoma skin cancer discussed with patient   Return to clinic 4 months      Again, thank you for allowing me to participate in the care of your patient.        Sincerely,        Jaleel Swartz MD

## 2019-09-05 NOTE — PROGRESS NOTES
Alison Chandler is a 54 year old year old female patient here today for hx of 0.7mm melanoma on left arm.  She notes spot on rbeast and left arm and r calf.   .  Patient states this has been present for ?.  Patient reports the following symptoms:  Brown .  Patient reports the following previous treatments none.  Patient reports the following modifying factors none.  Associated symptoms: none.  Patient has no other skin complaints today.  Remainder of the HPI, Meds, PMH, Allergies, FH, and SH was reviewed in chart.      Past Medical History:   Diagnosis Date     Malignant melanoma (H)        No past surgical history on file.     Family History   Problem Relation Age of Onset     Diabetes Mother 50        type 2     Hypertension Mother      Hyperlipidemia Father      Melanoma No family hx of        Social History     Socioeconomic History     Marital status:      Spouse name: Not on file     Number of children: 1     Years of education: Not on file     Highest education level: Bachelor's degree (e.g., BA, AB, BS)   Occupational History     Occupation:      Employer: OTHER     Comment: [employer]   Social Needs     Financial resource strain: Not hard at all     Food insecurity:     Worry: Never true     Inability: Never true     Transportation needs:     Medical: No     Non-medical: No   Tobacco Use     Smoking status: Never Smoker     Smokeless tobacco: Never Used   Substance and Sexual Activity     Alcohol use: Yes     Frequency: 2-4 times a month     Drinks per session: 1 or 2     Binge frequency: Never     Drug use: No     Sexual activity: Yes   Lifestyle     Physical activity:     Days per week: 1 day     Minutes per session: 30 min     Stress: Only a little   Relationships     Social connections:     Talks on phone: More than three times a week     Gets together: Twice a week     Attends Hoahaoism service: Never     Active member of club or organization: No     Attends  meetings of clubs or organizations: Patient refused     Relationship status:      Intimate partner violence:     Fear of current or ex partner: Not on file     Emotionally abused: Not on file     Physically abused: Not on file     Forced sexual activity: Not on file   Other Topics Concern     Parent/sibling w/ CABG, MI or angioplasty before 65F 55M? Not Asked   Social History Narrative     Not on file       Outpatient Encounter Medications as of 9/5/2019   Medication Sig Dispense Refill     FLUoxetine (PROZAC) 20 MG capsule Take 1 capsule (20 mg) by mouth daily (Patient not taking: Reported on 7/31/2019) 90 capsule 1     FLUoxetine (PROZAC) 40 MG capsule Take 1 capsule (40 mg) by mouth daily 90 capsule 3     Multiple Vitamins-Minerals (MULTIVITAMIN ADULT PO) Take by mouth daily       Omega-3 Fatty Acids (FISH OIL) 1200 MG CAPS Take 1 capsule by mouth daily       UNABLE TO FIND MEDICATION NAME: calcium 1000 mg daily        Vitamin D, Cholecalciferol, 1000 UNITS CAPS Take 1 tablet by mouth 2 times daily       No facility-administered encounter medications on file as of 9/5/2019.              Review Of Systems  Skin: As above  Eyes: negative  Ears/Nose/Throat: negative  Respiratory: No shortness of breath, dyspnea on exertion, cough, or hemoptysis  Cardiovascular: negative  Gastrointestinal: negative  Genitourinary: negative  Musculoskeletal: negative  Neurologic: negative  Psychiatric: negative  Hematologic/Lymphatic/Immunologic: negative  Endocrine: negative      O:   NAD, WDWN, Alert & Oriented, Mood & Affect wnl, Vitals stable   Here today alone   /82   Ht 1.829 m (6')   LMP 01/18/2018   BMI 33.72 kg/m     General appearance normal   Vitals stable   Alert, oriented and in no acute distress      Following lymph nodes palpated: Occipital, Cervical, Supraclavicular , axilla no lad   L arm well healed   Breast, r calf, l arm stuck on papule      Stuck on papules and brown macules on trunk and ext   Red  papules on trunk  Flesh colored papules on trunk     The remainder of the full exam was unremarkable; the following areas were examined:  conjunctiva/lids, oral mucosa, neck, peripheral vascular system, abdomen, lymph nodes, digits/nails, eccrine and apocrine glands, scalp/hair, face, neck, chest, abdomen, buttocks, back, RUE, LUE, RLE, LLE       Eyes: Conjunctivae/lids:Normal     ENT: Lips, buccal mucosa, tongue: normal    MSK:Normal    Cardiovascular: peripheral edema none    Pulm: Breathing Normal    Lymph Nodes: No Head and Neck Lymphadenopathy     Neuro/Psych: Orientation:Normal; Mood/Affect:Normal      A/P:  1. Seborrheic keratosis, lentigo, angioma, dermal nevus, hx of melanoma  MELANOMA DISCUSSED WITH PATIENT:  I discussed the specifics of tumor, prognosis, metachronous melanoma, self exam, and genetics with the patient. I explained the need for monthly skin exams including and taught the patient how to do this. Patient was asked about new or changing moles . I discussed with patient signs and symptoms that could arise in the setting of recurrent locoregional or metastatic disease. In addition, the need to undergo every 4 month dermatologic full skin survey and evaluation given that patients with a diagnosis of melanoma are at risk of recurrence (local and distant) and of subsequent de kenney melanoma.      BENIGN LESIONS DISCUSSED WITH PATIENT:  I discussed the specifics of tumor, prognosis, and genetics of benign lesions.  I explained that treatment of these lesions would be purely cosmetic and not medically neccessary.  I discussed with patient different removal options including excision, cautery and /or laser.      Nature and genetics of benign skin lesions dicussed with patient.  Signs and Symptoms of skin cancer discussed with patient.  ABCDEs of melanoma reviewed with patient.  Patient encouraged to perform monthly skin exams.  UV precautions reviewed with patient.  Skin care regimen reviewed with  patient: Eliminate harsh soaps, i.e. Dial, zest, irsih spring; Mild soaps such as Cetaphil or Dove sensitive skin, avoid hot or cold showers, aggressive use of emollients including vanicream, cetaphil or cerave discussed with patient.    Risks of non-melanoma skin cancer discussed with patient   Return to clinic 4 months

## 2019-09-05 NOTE — NURSING NOTE
Initial /82   Ht 1.829 m (6')   LMP 01/18/2018   BMI 33.72 kg/m   Estimated body mass index is 33.72 kg/m  as calculated from the following:    Height as of this encounter: 1.829 m (6').    Weight as of 5/28/19: 112.8 kg (248 lb 9.6 oz). .

## 2020-01-08 ENCOUNTER — OFFICE VISIT (OUTPATIENT)
Dept: DERMATOLOGY | Facility: CLINIC | Age: 55
End: 2020-01-08
Payer: COMMERCIAL

## 2020-01-08 VITALS — DIASTOLIC BLOOD PRESSURE: 72 MMHG | SYSTOLIC BLOOD PRESSURE: 120 MMHG

## 2020-01-08 DIAGNOSIS — D18.01 CHERRY ANGIOMA: ICD-10-CM

## 2020-01-08 DIAGNOSIS — Z85.820 HISTORY OF MELANOMA: Primary | ICD-10-CM

## 2020-01-08 DIAGNOSIS — D48.5 NEOPLASM OF UNCERTAIN BEHAVIOR OF SKIN: ICD-10-CM

## 2020-01-08 DIAGNOSIS — L82.1 SEBORRHEIC KERATOSES: ICD-10-CM

## 2020-01-08 DIAGNOSIS — L81.4 LENTIGINES: ICD-10-CM

## 2020-01-08 DIAGNOSIS — D22.9 MULTIPLE BENIGN NEVI: ICD-10-CM

## 2020-01-08 PROCEDURE — 11103 TANGNTL BX SKIN EA SEP/ADDL: CPT | Performed by: PHYSICIAN ASSISTANT

## 2020-01-08 PROCEDURE — 11102 TANGNTL BX SKIN SINGLE LES: CPT | Performed by: PHYSICIAN ASSISTANT

## 2020-01-08 PROCEDURE — 99214 OFFICE O/P EST MOD 30 MIN: CPT | Mod: 25 | Performed by: PHYSICIAN ASSISTANT

## 2020-01-08 PROCEDURE — 88305 TISSUE EXAM BY PATHOLOGIST: CPT | Mod: TC | Performed by: PHYSICIAN ASSISTANT

## 2020-01-08 NOTE — PATIENT INSTRUCTIONS
Wound Care Instructions     FOR SUPERFICIAL WOUNDS     [facility] [phone]    [facility] [phone]          AFTER 24 HOURS YOU SHOULD REMOVE THE BANDAGE AND BEGIN DAILY DRESSING CHANGES AS FOLLOWS:     1) Remove Dressing.     2) Clean and dry the area with tap water using a Q-tip or sterile gauze pad.     3) Apply Vaseline, Aquaphor, Polysporin ointment or Bacitracin ointment over entire wound.  Do NOT use Neosporin ointment.     4) Cover the wound with a band-aid, or a sterile non-stick gauze pad and micropore paper tape      REPEAT THESE INSTRUCTIONS AT LEAST ONCE A DAY UNTIL THE WOUND HAS COMPLETELY HEALED.    It is an old wives tale that a wound heals better when it is exposed to air and allowed to dry out. The wound will heal faster with a better cosmetic result if it is kept moist with ointment and covered with a bandage.    **Do not let the wound dry out.**      Supplies Needed:      *Cotton tipped applicators (Q-tips)    *Polysporin Ointment or Bacitracin Ointment (NOT NEOSPORIN)    *Band-aids or non-stick gauze pads and micropore paper tape.      PATIENT INFORMATION:    During the healing process you will notice a number of changes. All wounds develop a small halo of redness surrounding the wound.  This means healing is occurring. Severe itching with extensive redness usually indicates sensitivity to the ointment or bandage tape used to dress the wound.  You should call our office if this develops.      Swelling  and/or discoloration around your surgical site is common, particularly when performed around the eye.    All wounds normally drain.  The larger the wound the more drainage there will be.  After 7-10 days, you will notice the wound beginning to shrink and new skin will begin to grow.  The wound is healed when you can see skin has formed over the entire area.  A healed wound has a healthy, shiny look to the surface and is red to dark pink in color to  normalize.  Wounds may take approximately 4-6 weeks to heal.  Larger wounds may take 6-8 weeks.  After the wound is healed you may discontinue dressing changes.    You may experience a sensation of tightness as your wound heals. This is normal and will gradually subside.    Your healed wound may be sensitive to temperature changes. This sensitivity improves with time, but if you re having a lot of discomfort, try to avoid temperature extremes.    Patients frequently experience itching after their wound appears to have healed because of the continue healing under the skin.  Plain Vaseline will help relieve the itching.        POSSIBLE COMPLICATIONS    BLEEDIN. Leave the bandage in place.  2. Use tightly rolled up gauze or a cloth to apply direct pressure over the bandage for 30  minutes.  3. Reapply pressure for an additional 30 minutes if necessary  4. Use additional gauze and tape to maintain pressure once the bleeding has stopped.        Proper skin care from [facility]:    -Eliminate harsh soaps as they strip the natural oils from the skin, often resulting in dry itchy skin ( i.e. Dial, Zest, [place] Spring)  -Use mild soaps such as Cetaphil or Dove Sensitive Skin in the shower. You do not need to use soap on arms, legs, and trunk every time you shower unless visibly soiled.   -Avoid hot or cold showers.  -After showering, lightly dry off and apply moisturizing within 2-3 minutes. This will help trap moisture in the skin.   -Aggressive use of a moisturizer at least 1-2 times a day to the entire body (including -Vanicream, Cetaphil, Aquaphor or Cerave) and moisturize hands after every washing.  -We recommend using moisturizers that come in a tub that needs to be scooped out, not a pump. This has more of an oil base. It will hold moisture in your skin much better than a water base moisturizer. The above recommended are non-pore clogging.      Wear a sunscreen with at least SPF 30 on your face, ears,  neck and V of the chest daily. Wear sunscreen on other areas of the body if those areas are exposed to the sun throughout the day. Sunscreens can contain physical and/or chemical blockers. Physical blockers are less likely to clog pores, these include zinc oxide and titanium dioxide. Reapply every two hour and after swimming. Sunscreen examples include Neutrogena, CeraVe, Blue Lizard, Elta MD and many others.    UV radiation  UVA radiation remains constant throughout the day and throughout the year. It is a longer wavelength than UVB and therefore penetrates deeper into the skin leading to immediate and delayed tanning, photoaging, and skin cancer. 70-80% of UVA and UVB radiation occurs between the hours of 10am-2pm.  UVB radiation  UVB radiation causes the most harmful effects and is more significant during the summer months. However, snow and ice can reflect UVB radiation leading to skin damage during the winter months as well. UVB radiation is responsible for tanning, burning, inflammation, delayed erythema (pinkness), pigmentation (brown spots), and skin cancer.

## 2020-01-08 NOTE — LETTER
1/8/2020         RE: Alison Chandler  [address] [facility] [address]        Dear Colleague,    Thank you for referring your patient, Alison Chandler, to the [facility]. Please see a copy of my visit note below.    HPI:  Alison Chandler is a 54 year old female patient here today for hx of MM. Has a spot on right leg .  Patient states this has been present for a while.  Patient reports the following symptoms: scaly .  Patient reports the following previous treatments: none.  Patient reports the following modifying factors: none.  Associated symptoms: none.  Patient has no other skin complaints today.  Remainder of the HPI, Meds, PMH, Allergies, FH, and SH was reviewed in chart.    Pertinent Hx:   hx of 0.7mm melanoma on left posterior arm tx by mohs 8/6/19  Past Medical History:   Diagnosis Date     Malignant melanoma (H)        History reviewed. No pertinent surgical history.     Family History   Problem Relation Age of Onset     Diabetes Mother 50        type 2     Hypertension Mother      Hyperlipidemia Father      Melanoma No family hx of        Social History     Socioeconomic History     Marital status:      Spouse name: Not on file     Number of children: 1     Years of education: Not on file     Highest education level: Bachelor's degree (e.g., BA, AB, BS)   Occupational History     Occupation:      Employer: OTHER     Comment: [employer]   Social Needs     Financial resource strain: Not hard at all     Food insecurity:     Worry: Never true     Inability: Never true     Transportation needs:     Medical: No     Non-medical: No   Tobacco Use     Smoking status: Never Smoker     Smokeless tobacco: Never Used   Substance and Sexual Activity     Alcohol use: Yes     Frequency: 2-4 times a month     Drinks per session: 1 or 2     Binge frequency: Never     Drug use: No     Sexual activity: Yes   Lifestyle      Physical activity:     Days per week: 1 day     Minutes per session: 30 min     Stress: Only a little   Relationships     Social connections:     Talks on phone: More than three times a week     Gets together: Twice a week     Attends Temple service: Never     Active member of club or organization: No     Attends meetings of clubs or organizations: Patient refused     Relationship status:      Intimate partner violence:     Fear of current or ex partner: Not on file     Emotionally abused: Not on file     Physically abused: Not on file     Forced sexual activity: Not on file   Other Topics Concern     Parent/sibling w/ CABG, MI or angioplasty before 65F 55M? Not Asked   Social History Narrative     Not on file       Outpatient Encounter Medications as of 1/8/2020   Medication Sig Dispense Refill     FLUoxetine (PROZAC) 40 MG capsule Take 1 capsule (40 mg) by mouth daily 90 capsule 3     Multiple Vitamins-Minerals (MULTIVITAMIN ADULT PO) Take by mouth daily       Omega-3 Fatty Acids (FISH OIL) 1200 MG CAPS Take 1 capsule by mouth daily       UNABLE TO FIND MEDICATION NAME: calcium 1000 mg daily        Vitamin D, Cholecalciferol, 1000 UNITS CAPS Take 1 tablet by mouth 2 times daily       FLUoxetine (PROZAC) 20 MG capsule Take 1 capsule (20 mg) by mouth daily (Patient not taking: Reported on 7/31/2019) 90 capsule 1     No facility-administered encounter medications on file as of 1/8/2020.        Review Of Systems:  Skin: spot on right leg  Eyes: negative  Ears/Nose/Throat: negative  Respiratory: No shortness of breath, dyspnea on exertion, cough, or hemoptysis  Cardiovascular: negative  Gastrointestinal: negative  Genitourinary: negative  Musculoskeletal: negative  Neurologic: negative  Psychiatric: negative  Hematologic/Lymphatic/Immunologic: negative  Endocrine: negative      Objective:     /72   LMP 01/18/2018   Eyes: Conjunctivae/lids: Normal   ENT: Lips:  Normal  MSK: Normal  Cardiovascular:  Peripheral edema none  Pulm: Breathing Normal  Neuro/Psych: Orientation: A/O x 3. Normal; Mood/Affect: Normal, NAD, WDWN  Pt accompanied by: self  Following areas examined: Scalp, face, eyelids, lips, neck, chest, abdomen, back,  and R&L upper and lower extremities. Pt defers exam of buttock, hips, groin and genitals.  Nodes: left axillary and antecubital fossa NLAD  Lepe skin type:ii   Findings:  Red smooth well-defined macules on trunk and extremities.  Brown, stuck-on scaly appearing papules on right leg, left cheek,  trunk and extremities.  Well circumscribed macules with symmetric color distribution on trunk and extremities.  Tan WD smooth macules on face, neck, trunk, and extremities.  Smooth pink patch on left posterior arm  Pink pearly scaly papule on left lateral distal arm 0.3cm  Brown smooth macule on right medial heel 0.1cm    Assessment and Plan:     1) Cherry angiomas, Seborrheic keratoses, Benign nevi, Lentigines     I discussed the specifics of tumor, prognosis, and genetics of benign lesions.  I explained that treatment of these lesions would be purely cosmetic and not medically neccessary.  I discussed with patient different removal options including excision, cryotherapy, cautery and /or laser.  Lesion may recur and/or may not completely resolve. May need additional treatment.     2) Neoplasm of uncertain behavior left lateral distal arm 0.3cm  BCC vs ISK vs LK vs SCC   TANGENTIAL BIOPSY:  After consent, anesthesia with LEC and prep, tangential biopsy performed.  No complications and routine wound care.  May grow back and will get a scar. Based on lesion type may need to completely remove lesion. Patient will be notified in 7-10 days of results. Wound care directions given.    3) Neoplasm of uncertain behavior right medial heel 0.1cm  Atypical nevus vs MM  TANGENTIAL BIOPSY:  After consent, anesthesia with LEC and prep, tangential biopsy performed.  No complications and routine wound care.   May grow back and will get a scar. Based on lesion type may need to completely remove lesion. Patient will be notified in 7-10 days of results. Wound care directions given.    4) hx of 0.7mm melanoma on left posterior arm tx by mohs 8/6/19  No evidence of recurrence  Educated and explained how to palpate lymph nodes monthly.   Signs and Symptoms of non-melanoma skin cancer and ABCDEs of melanoma reviewed with patient. Patient encouraged to perform monthly self skin exams and educated on how to perform them. UV precautions reviewed with patient. Patient was asked about new or changing moles/lesions on body.   Wear a sunscreen with at least SPF 30 on your face, ears, neck and V of the chest daily. Wear sunscreen on other areas of the body if those areas are exposed to the sun throughout the day. Sunscreens can contain physical and/or chemical blockers. Physical blockers are less likely to clog pores, these include zinc oxide and titanium dioxide. Reapply every two hour and after swimming. Sunscreen examples include Neutrogena, CeraVe, Blue Lizard, Elta MD and many others.    Proper skin care from [facility]:    -Eliminate harsh soaps as they strip the natural oils from the skin, often resulting in dry itchy skin ( i.e. Dial, Zest, [place] Spring)  -Use mild soaps such as Cetaphil or Dove Sensitive Skin in the shower. You do not need to use soap on arms, legs, and trunk every time you shower unless visibly soiled.   -Avoid hot or cold showers.  -After showering, lightly dry off and apply moisturizing within 2-3 minutes. This will help trap moisture in the skin.   -Aggressive use of a moisturizer at least 1-2 times a day to the entire body (including -Vanicream, Cetaphil, Aquaphor or Cerave) and moisturize hands after every washing.  -We recommend using moisturizers that come in a tub that needs to be scooped out, not a pump. This has more of an oil base. It will hold moisture in your skin much better than a  water base moisturizer. The above recommended are non-pore clogging.               Follow up in 4 months for FBE      Again, thank you for allowing me to participate in the care of your patient.        Sincerely,        Cate Gregg PA-C

## 2020-01-08 NOTE — PROGRESS NOTES
HPI:  Alison Chandler is a 54 year old female patient here today for hx of MM. Has a spot on right leg .  Patient states this has been present for a while.  Patient reports the following symptoms: scaly .  Patient reports the following previous treatments: none.  Patient reports the following modifying factors: none.  Associated symptoms: none.  Patient has no other skin complaints today.  Remainder of the HPI, Meds, PMH, Allergies, FH, and SH was reviewed in chart.    Pertinent Hx:   hx of 0.7mm melanoma on left posterior arm tx by mohs 8/6/19  Past Medical History:   Diagnosis Date     Malignant melanoma (H)        History reviewed. No pertinent surgical history.     Family History   Problem Relation Age of Onset     Diabetes Mother 50        type 2     Hypertension Mother      Hyperlipidemia Father      Melanoma No family hx of        Social History     Socioeconomic History     Marital status:      Spouse name: Not on file     Number of children: 1     Years of education: Not on file     Highest education level: Bachelor's degree (e.g., BA, AB, BS)   Occupational History     Occupation:      Employer: OTHER     Comment: [employer]   Social Needs     Financial resource strain: Not hard at all     Food insecurity:     Worry: Never true     Inability: Never true     Transportation needs:     Medical: No     Non-medical: No   Tobacco Use     Smoking status: Never Smoker     Smokeless tobacco: Never Used   Substance and Sexual Activity     Alcohol use: Yes     Frequency: 2-4 times a month     Drinks per session: 1 or 2     Binge frequency: Never     Drug use: No     Sexual activity: Yes   Lifestyle     Physical activity:     Days per week: 1 day     Minutes per session: 30 min     Stress: Only a little   Relationships     Social connections:     Talks on phone: More than three times a week     Gets together: Twice a week     Attends Buddhist service: Never     Active member  of club or organization: No     Attends meetings of clubs or organizations: Patient refused     Relationship status:      Intimate partner violence:     Fear of current or ex partner: Not on file     Emotionally abused: Not on file     Physically abused: Not on file     Forced sexual activity: Not on file   Other Topics Concern     Parent/sibling w/ CABG, MI or angioplasty before 65F 55M? Not Asked   Social History Narrative     Not on file       Outpatient Encounter Medications as of 1/8/2020   Medication Sig Dispense Refill     FLUoxetine (PROZAC) 40 MG capsule Take 1 capsule (40 mg) by mouth daily 90 capsule 3     Multiple Vitamins-Minerals (MULTIVITAMIN ADULT PO) Take by mouth daily       Omega-3 Fatty Acids (FISH OIL) 1200 MG CAPS Take 1 capsule by mouth daily       UNABLE TO FIND MEDICATION NAME: calcium 1000 mg daily        Vitamin D, Cholecalciferol, 1000 UNITS CAPS Take 1 tablet by mouth 2 times daily       FLUoxetine (PROZAC) 20 MG capsule Take 1 capsule (20 mg) by mouth daily (Patient not taking: Reported on 7/31/2019) 90 capsule 1     No facility-administered encounter medications on file as of 1/8/2020.        Review Of Systems:  Skin: spot on right leg  Eyes: negative  Ears/Nose/Throat: negative  Respiratory: No shortness of breath, dyspnea on exertion, cough, or hemoptysis  Cardiovascular: negative  Gastrointestinal: negative  Genitourinary: negative  Musculoskeletal: negative  Neurologic: negative  Psychiatric: negative  Hematologic/Lymphatic/Immunologic: negative  Endocrine: negative      Objective:     /72   LMP 01/18/2018   Eyes: Conjunctivae/lids: Normal   ENT: Lips:  Normal  MSK: Normal  Cardiovascular: Peripheral edema none  Pulm: Breathing Normal  Neuro/Psych: Orientation: A/O x 3. Normal; Mood/Affect: Normal, NAD, WDWN  Pt accompanied by: self  Following areas examined: Scalp, face, eyelids, lips, neck, chest, abdomen, back,  and R&L upper and lower extremities. Pt defers exam  of buttock, hips, groin and genitals.  Nodes: left axillary and antecubital fossa NLAD  Lepe skin type:ii   Findings:  Red smooth well-defined macules on trunk and extremities.  Brown, stuck-on scaly appearing papules on right leg, left cheek,  trunk and extremities.  Well circumscribed macules with symmetric color distribution on trunk and extremities.  Tan WD smooth macules on face, neck, trunk, and extremities.  Smooth pink patch on left posterior arm  Pink pearly scaly papule on left lateral distal arm 0.3cm  Brown smooth macule on right medial heel 0.1cm    Assessment and Plan:     1) Cherry angiomas, Seborrheic keratoses, Benign nevi, Lentigines     I discussed the specifics of tumor, prognosis, and genetics of benign lesions.  I explained that treatment of these lesions would be purely cosmetic and not medically neccessary.  I discussed with patient different removal options including excision, cryotherapy, cautery and /or laser.  Lesion may recur and/or may not completely resolve. May need additional treatment.     2) Neoplasm of uncertain behavior left lateral distal arm 0.3cm  BCC vs ISK vs LK vs SCC   TANGENTIAL BIOPSY:  After consent, anesthesia with LEC and prep, tangential biopsy performed.  No complications and routine wound care.  May grow back and will get a scar. Based on lesion type may need to completely remove lesion. Patient will be notified in 7-10 days of results. Wound care directions given.    3) Neoplasm of uncertain behavior right medial heel 0.1cm  Atypical nevus vs MM  TANGENTIAL BIOPSY:  After consent, anesthesia with LEC and prep, tangential biopsy performed.  No complications and routine wound care.  May grow back and will get a scar. Based on lesion type may need to completely remove lesion. Patient will be notified in 7-10 days of results. Wound care directions given.    4) hx of 0.7mm melanoma on left posterior arm tx by mohs 8/6/19  No evidence of recurrence  Educated and  explained how to palpate lymph nodes monthly.   Signs and Symptoms of non-melanoma skin cancer and ABCDEs of melanoma reviewed with patient. Patient encouraged to perform monthly self skin exams and educated on how to perform them. UV precautions reviewed with patient. Patient was asked about new or changing moles/lesions on body.   Wear a sunscreen with at least SPF 30 on your face, ears, neck and V of the chest daily. Wear sunscreen on other areas of the body if those areas are exposed to the sun throughout the day. Sunscreens can contain physical and/or chemical blockers. Physical blockers are less likely to clog pores, these include zinc oxide and titanium dioxide. Reapply every two hour and after swimming. Sunscreen examples include Neutrogena, CeraVe, Blue Lizard, Elta MD and many others.    Proper skin care from [facility]:    -Eliminate harsh soaps as they strip the natural oils from the skin, often resulting in dry itchy skin ( i.e. Dial, Zest, [place] Spring)  -Use mild soaps such as Cetaphil or Dove Sensitive Skin in the shower. You do not need to use soap on arms, legs, and trunk every time you shower unless visibly soiled.   -Avoid hot or cold showers.  -After showering, lightly dry off and apply moisturizing within 2-3 minutes. This will help trap moisture in the skin.   -Aggressive use of a moisturizer at least 1-2 times a day to the entire body (including -Vanicream, Cetaphil, Aquaphor or Cerave) and moisturize hands after every washing.  -We recommend using moisturizers that come in a tub that needs to be scooped out, not a pump. This has more of an oil base. It will hold moisture in your skin much better than a water base moisturizer. The above recommended are non-pore clogging.               Follow up in 4 months for FBE

## 2020-01-13 LAB — COPATH REPORT: NORMAL

## 2020-01-28 DIAGNOSIS — Z12.31 VISIT FOR SCREENING MAMMOGRAM: ICD-10-CM

## 2020-01-28 PROCEDURE — 77067 SCR MAMMO BI INCL CAD: CPT | Mod: TC

## 2020-02-25 ENCOUNTER — TELEPHONE (OUTPATIENT)
Dept: DERMATOLOGY | Facility: CLINIC | Age: 55
End: 2020-02-25

## 2020-02-25 NOTE — TELEPHONE ENCOUNTER
Patient is having a lot of itching near her wound area. She would like to speak to a nurse. She is in a meeting from 3:00pm to 3:30pm today.

## 2020-02-25 NOTE — TELEPHONE ENCOUNTER
Called and LM for patient to call back.    Marin RN-BSN-N  [facility]  [phone]    Patient called returning call to rachel please call her back at [phone]

## 2020-02-25 NOTE — TELEPHONE ENCOUNTER
Called and spoke to patient.    Patient had a melanoma on her left arm removed on 9/5/19.    Patients skin around the perimeter of the affected area is very itchy. Patient doesn't see anything visibly concerning- just feels itchy.    Offered patient a same week appointment w/ Dr. Swartz to have the area assessed- patient declined.    Advised patient to apply liberal amounts of ointment to the area that is itchy- and call back if symptoms persist/worsen.    Patient voiced understanding.    LYNDSEY Funes-BSN-PHN  [facility]  [phone]

## 2020-03-10 ENCOUNTER — HEALTH MAINTENANCE LETTER (OUTPATIENT)
Age: 55
End: 2020-03-10

## 2020-05-06 ENCOUNTER — OFFICE VISIT (OUTPATIENT)
Dept: DERMATOLOGY | Facility: CLINIC | Age: 55
End: 2020-05-06
Payer: COMMERCIAL

## 2020-05-06 VITALS — DIASTOLIC BLOOD PRESSURE: 83 MMHG | OXYGEN SATURATION: 98 % | HEART RATE: 51 BPM | SYSTOLIC BLOOD PRESSURE: 133 MMHG

## 2020-05-06 DIAGNOSIS — L81.4 LENTIGINES: ICD-10-CM

## 2020-05-06 DIAGNOSIS — D22.9 MULTIPLE BENIGN NEVI: Primary | ICD-10-CM

## 2020-05-06 DIAGNOSIS — Z85.820 HISTORY OF MELANOMA: ICD-10-CM

## 2020-05-06 DIAGNOSIS — L82.1 SEBORRHEIC KERATOSES: ICD-10-CM

## 2020-05-06 DIAGNOSIS — D18.01 CHERRY ANGIOMA: ICD-10-CM

## 2020-05-06 DIAGNOSIS — L82.0 BENIGN LICHENOID KERATOSIS: ICD-10-CM

## 2020-05-06 PROCEDURE — 99214 OFFICE O/P EST MOD 30 MIN: CPT | Performed by: PHYSICIAN ASSISTANT

## 2020-05-06 NOTE — PROGRESS NOTES
HPI:  Alison Chandler is a 55 year old female patient here today for FBE hx of 0.7mm MM. Pt had a bx on left arm LOV benign lichenoid keratosis. States it never went away. Not bothersome .  Patient states this has been present for none.  Patient reports the following symptoms: none .  Patient reports the following previous treatments: none.  Patient reports the following modifying factors: none.  Associated symptoms: none.  Patient has no other skin complaints today.  Remainder of the HPI, Meds, PMH, Allergies, FH, and SH was reviewed in chart.    Pertinent Hx:   hx of 0.7mm melanoma on left posterior arm tx by mohs 8/6/19  Past Medical History:   Diagnosis Date     Malignant melanoma (H)        History reviewed. No pertinent surgical history.     Family History   Problem Relation Age of Onset     Diabetes Mother 50        type 2     Hypertension Mother      Hyperlipidemia Father      Melanoma No family hx of        Social History     Socioeconomic History     Marital status:      Spouse name: Not on file     Number of children: 1     Years of education: Not on file     Highest education level: Bachelor's degree (e.g., BA, AB, BS)   Occupational History     Occupation:      Employer: OTHER     Comment: [employer]   Social Needs     Financial resource strain: Not hard at all     Food insecurity     Worry: Never true     Inability: Never true     Transportation needs     Medical: No     Non-medical: No   Tobacco Use     Smoking status: Never Smoker     Smokeless tobacco: Never Used   Substance and Sexual Activity     Alcohol use: Yes     Frequency: 2-4 times a month     Drinks per session: 1 or 2     Binge frequency: Never     Drug use: No     Sexual activity: Yes   Lifestyle     Physical activity     Days per week: 1 day     Minutes per session: 30 min     Stress: Only a little   Relationships     Social connections     Talks on phone: More than three times a week     Gets  together: Twice a week     Attends Denominational service: Never     Active member of club or organization: No     Attends meetings of clubs or organizations: Patient refused     Relationship status:      Intimate partner violence     Fear of current or ex partner: Not on file     Emotionally abused: Not on file     Physically abused: Not on file     Forced sexual activity: Not on file   Other Topics Concern     Parent/sibling w/ CABG, MI or angioplasty before 65F 55M? Not Asked   Social History Narrative     Not on file       Outpatient Encounter Medications as of 5/6/2020   Medication Sig Dispense Refill     FLUoxetine (PROZAC) 40 MG capsule Take 1 capsule (40 mg) by mouth daily 90 capsule 3     Multiple Vitamins-Minerals (MULTIVITAMIN ADULT PO) Take by mouth daily       Omega-3 Fatty Acids (FISH OIL) 1200 MG CAPS Take 1 capsule by mouth daily       UNABLE TO FIND MEDICATION NAME: calcium 1000 mg daily        Vitamin D, Cholecalciferol, 1000 UNITS CAPS Take 1 tablet by mouth 2 times daily       FLUoxetine (PROZAC) 20 MG capsule Take 1 capsule (20 mg) by mouth daily (Patient not taking: Reported on 7/31/2019) 90 capsule 1     No facility-administered encounter medications on file as of 5/6/2020.        Review Of Systems:  Skin: spot on left arm  Eyes: negative  Ears/Nose/Throat: negative  Respiratory: No shortness of breath, dyspnea on exertion, cough, or hemoptysis  Cardiovascular: negative  Gastrointestinal: negative  Genitourinary: negative  Musculoskeletal: negative  Neurologic: negative  Psychiatric: negative  Hematologic/Lymphatic/Immunologic: negative  Endocrine: negative      Objective:     /83   Pulse 51   LMP 01/18/2018   SpO2 98%   Breastfeeding No   Eyes: Conjunctivae/lids: Normal   ENT: Lips:  Normal  MSK: Normal  Cardiovascular: Peripheral edema none  Pulm: Breathing Normal  Neuro/Psych: Orientation: A/O x 3. Normal; Mood/Affect: Normal, NAD, WDWN  Pt accompanied by: self  Following areas  examined: Scalp, face, eyelids, lips, neck, chest, abdomen, back, buttocks, and R&L upper and lower extremities. Pt defers exam of buttock, hips, groin and genitals.   Nodes: left axilla, left antecubital NLAD  Lepe skin type:i   Findings:  Red smooth well-defined macules on trunk and extremities.  Brown, stuck-on scaly appearing papules on trunk and extremities.  Well circumscribed macules with symmetric color distribution on trunk and extremities.  Tan WD smooth macules on face, neck, trunk, and extremities.  Smooth pink/purple linear patch on left posterior arm  Brown/purples scaly macule on left lateral distal arm    Assessment and Plan:     1) Cherry angiomas, Seborrheic keratoses, Benign nevi, Lentigines     I discussed the specifics of tumor, prognosis, and genetics of benign lesions.  I explained that treatment of these lesions would be purely cosmetic and not medically neccessary.  I discussed with patient different removal options including excision, cryotherapy, cautery and /or laser.  Lesion may recur and/or may not completely resolve. May need additional treatment.     2) nevus and keratosis  reviewed pts path  A. Shave, right medial heel:   - Lentiginous junctional melanocytic nevus - (see description)     B. Shave, left lateral distal arm:   Benign lichenoid keratosis  3) hx of 0.7mm melanoma on left posterior arm tx by mohs 8/6/19  Signs and Symptoms of non-melanoma skin cancer and ABCDEs of melanoma reviewed with patient. Patient encouraged to perform monthly self skin exams and educated on how to perform them. UV precautions reviewed with patient. Patient was asked about new or changing moles/lesions on body.   Wear a sunscreen with at least SPF 30 on your face, ears, neck and V of the chest daily. Wear sunscreen on other areas of the body if those areas are exposed to the sun throughout the day. Sunscreens can contain physical and/or chemical blockers. Physical blockers are less likely to  clog pores, these include zinc oxide and titanium dioxide. Reapply every two hour and after swimming. Sunscreen examples include Neutrogena, CeraVe, Blue Lizard, Elta MD and many others.    Proper skin care from [facility]:    -Eliminate harsh soaps as they strip the natural oils from the skin, often resulting in dry itchy skin ( i.e. Dial, Zest, [place] Spring)  -Use mild soaps such as Cetaphil or Dove Sensitive Skin in the shower. You do not need to use soap on arms, legs, and trunk every time you shower unless visibly soiled.   -Avoid hot or cold showers.  -After showering, lightly dry off and apply moisturizing within 2-3 minutes. This will help trap moisture in the skin.   -Aggressive use of a moisturizer at least 1-2 times a day to the entire body (including -Vanicream, Cetaphil, Aquaphor or Cerave) and moisturize hands after every washing.  -We recommend using moisturizers that come in a tub that needs to be scooped out, not a pump. This has more of an oil base. It will hold moisture in your skin much better than a water base moisturizer. The above recommended are non-pore clogging.               Follow up in 4 months for FBE.

## 2020-05-06 NOTE — PATIENT INSTRUCTIONS
Proper skin care from [facility]:    -Eliminate harsh soaps as they strip the natural oils from the skin, often resulting in dry itchy skin ( i.e. Dial, Zest, Guerline Spring)  -Use mild soaps such as Cetaphil or Dove Sensitive Skin in the shower. You do not need to use soap on arms, legs, and trunk every time you shower unless visibly soiled.   -Avoid hot or cold showers.  -After showering, lightly dry off and apply moisturizing within 2-3 minutes. This will help trap moisture in the skin.   -Aggressive use of a moisturizer at least 1-2 times a day to the entire body (including -Vanicream, Cetaphil, Aquaphor or Cerave) and moisturize hands after every washing.  -We recommend using moisturizers that come in a tub that needs to be scooped out, not a pump. This has more of an oil base. It will hold moisture in your skin much better than a water base moisturizer. The above recommended are non-pore clogging.      Wear a sunscreen with at least SPF 30 on your face, ears, neck and V of the chest daily. Wear sunscreen on other areas of the body if those areas are exposed to the sun throughout the day. Sunscreens can contain physical and/or chemical blockers. Physical blockers are less likely to clog pores, these include zinc oxide and titanium dioxide. Reapply every two hour and after swimming. Sunscreen examples include Neutrogena, CeraVe, Blue Lizard, Elta MD and many others.    UV radiation  UVA radiation remains constant throughout the day and throughout the year. It is a longer wavelength than UVB and therefore penetrates deeper into the skin leading to immediate and delayed tanning, photoaging, and skin cancer. 70-80% of UVA and UVB radiation occurs between the hours of 10am-2pm.  UVB radiation  UVB radiation causes the most harmful effects and is more significant during the summer months. However, snow and ice can reflect UVB radiation leading to skin damage during the winter months as well. UVB radiation is  responsible for tanning, burning, inflammation, delayed erythema (pinkness), pigmentation (brown spots), and skin cancer.

## 2020-06-14 ENCOUNTER — MYC REFILL (OUTPATIENT)
Dept: PEDIATRICS | Facility: CLINIC | Age: 55
End: 2020-06-14

## 2020-06-14 DIAGNOSIS — F33.41 RECURRENT MAJOR DEPRESSIVE DISORDER, IN PARTIAL REMISSION (H): ICD-10-CM

## 2020-06-14 DIAGNOSIS — F41.9 ANXIETY: ICD-10-CM

## 2020-06-16 NOTE — TELEPHONE ENCOUNTER
Patient has an appointment with Dr. Arshad on 9/15/20. Please call patient and see if she has enough medication to get through until scheduled appointment. If not please schedule patient for a sooner appointment. Please assist patient in completing a PHQ9.    Love Warner RN on 6/16/2020 at 8:51 AM

## 2020-06-19 NOTE — TELEPHONE ENCOUNTER
[facility] outreach to assess if patient has enough medication to make it to scheduled appointment with PCP on 09/15/2020.     No answer, LVM asking to call back on [facility] direct extension.    Varun Marquez at 9:47 AM on 6/19/2020  [facility]  Phone [phone]

## 2020-06-22 RX ORDER — FLUOXETINE 40 MG/1
40 CAPSULE ORAL DAILY
Qty: 90 CAPSULE | Refills: 3 | OUTPATIENT
Start: 2020-06-22

## 2020-06-22 ASSESSMENT — PATIENT HEALTH QUESTIONNAIRE - PHQ9: SUM OF ALL RESPONSES TO PHQ QUESTIONS 1-9: 3

## 2020-06-22 NOTE — TELEPHONE ENCOUNTER
Patient says they should have enough meds to make it to scheduled appointment with PCP, can you remove med orders so I can close encounter. Thank you.    Routing comment

## 2020-06-22 NOTE — TELEPHONE ENCOUNTER
[facility] called patient to assess if they have enough medications to make it to appointment with PCP and to assist with PAH-9 Assessment. Patient answered, said they just got a refill and should have enough to make it to their appointment. [facility] assisted with PHQ-9 assessment.    Varun Marquez at 11:08 AM on 6/22/2020  [facility]  Phone [phone]

## 2020-07-17 ENCOUNTER — NURSE TRIAGE (OUTPATIENT)
Dept: NURSING | Facility: CLINIC | Age: 55
End: 2020-07-17

## 2020-07-17 DIAGNOSIS — Z11.59 SCREENING FOR VIRAL DISEASE: Primary | ICD-10-CM

## 2020-07-17 NOTE — TELEPHONE ENCOUNTER
"Patient is calling requesting COVID serologic antibody testing.  NOTE: Serologic testing is a blood test for 'antibodies' which are made at 10-14 days after you have had symptoms of COVID or were exposed and had an asymptomatic infection.  This does NOT test you for 'active' infection or tell you if you are contagious.    Are you a healthcare worker?  No  Do you currently have a cough, fever, body aches, shortness of breath, or difficulty breathing?  No  Did you previously have cough, fever, body aches, shortness of breath, or difficulty breathing that have now resolved? Has had previous covid symptoms.     Symptoms started > 14 days ago. Lab order placed per SARS-CoV-2 Serology test Standing Order using indication \"Previously symptomatic >14d since onset, currently asymptomatic\" and diagnosis code \"Screening for viral disease\" (Z11.59)      The patient was informed: \"Testing is limited each day and it may take time for testing to be available to everyone who has called. You will receive a call within 48-72 hours to schedule the serology testing. Please confirm the best number to reach you is [phone]. If you have any questions about scheduling, call [phone].\"     "

## 2020-08-05 DIAGNOSIS — Z11.59 SCREENING FOR VIRAL DISEASE: ICD-10-CM

## 2020-08-05 PROCEDURE — 36415 COLL VENOUS BLD VENIPUNCTURE: CPT | Performed by: EMERGENCY MEDICINE

## 2020-08-05 PROCEDURE — 86769 SARS-COV-2 COVID-19 ANTIBODY: CPT | Performed by: EMERGENCY MEDICINE

## 2020-08-06 LAB
COVID-19 SPIKE RBD ABY TITER: NORMAL
COVID-19 SPIKE RBD ABY: NEGATIVE

## 2020-09-09 ENCOUNTER — OFFICE VISIT (OUTPATIENT)
Dept: DERMATOLOGY | Facility: CLINIC | Age: 55
End: 2020-09-09
Payer: COMMERCIAL

## 2020-09-09 VITALS — DIASTOLIC BLOOD PRESSURE: 76 MMHG | SYSTOLIC BLOOD PRESSURE: 121 MMHG

## 2020-09-09 DIAGNOSIS — L82.1 SEBORRHEIC KERATOSES: ICD-10-CM

## 2020-09-09 DIAGNOSIS — D22.9 MULTIPLE BENIGN NEVI: ICD-10-CM

## 2020-09-09 DIAGNOSIS — Z85.820 HISTORY OF MELANOMA: ICD-10-CM

## 2020-09-09 DIAGNOSIS — D18.01 CHERRY ANGIOMA: Primary | ICD-10-CM

## 2020-09-09 DIAGNOSIS — L81.4 LENTIGINES: ICD-10-CM

## 2020-09-09 PROCEDURE — 99214 OFFICE O/P EST MOD 30 MIN: CPT | Performed by: PHYSICIAN ASSISTANT

## 2020-09-09 NOTE — PROGRESS NOTES
HPI:  Alison Chandler is a 55 year old female patient here today for here today for FBE. hx of 0.7mm MM. Has an itchy spot on back .  Patient states this has been present for years?.  Patient reports the following symptoms: has been itchy a few times .  Patient reports the following previous treatments: none.  Patient reports the following modifying factors: none.  Associated symptoms: none.  Patient has no other skin complaints today.  Remainder of the HPI, Meds, PMH, Allergies, FH, and SH was reviewed in chart.    Pertinent Hx:   Hx of 0.7mm melanoma on left posterior arm tx by mohs 8/6/19  Past Medical History:   Diagnosis Date     Malignant melanoma (H)        No past surgical history on file.     Family History   Problem Relation Age of Onset     Diabetes Mother 50        type 2     Hypertension Mother      Hyperlipidemia Father      Melanoma No family hx of        Social History     Socioeconomic History     Marital status:      Spouse name: Not on file     Number of children: 1     Years of education: Not on file     Highest education level: Bachelor's degree (e.g., BA, AB, BS)   Occupational History     Occupation:      Employer: OTHER     Comment: [employer]   Social Needs     Financial resource strain: Not hard at all     Food insecurity     Worry: Never true     Inability: Never true     Transportation needs     Medical: No     Non-medical: No   Tobacco Use     Smoking status: Never Smoker     Smokeless tobacco: Never Used   Substance and Sexual Activity     Alcohol use: Yes     Frequency: 2-4 times a month     Drinks per session: 1 or 2     Binge frequency: Never     Drug use: No     Sexual activity: Yes   Lifestyle     Physical activity     Days per week: 1 day     Minutes per session: 30 min     Stress: Only a little   Relationships     Social connections     Talks on phone: More than three times a week     Gets together: Twice a week     Attends Yazdanism  service: Never     Active member of club or organization: No     Attends meetings of clubs or organizations: Patient refused     Relationship status:      Intimate partner violence     Fear of current or ex partner: Not on file     Emotionally abused: Not on file     Physically abused: Not on file     Forced sexual activity: Not on file   Other Topics Concern     Parent/sibling w/ CABG, MI or angioplasty before 65F 55M? Not Asked   Social History Narrative     Not on file       Outpatient Encounter Medications as of 9/9/2020   Medication Sig Dispense Refill     FLUoxetine (PROZAC) 40 MG capsule Take 1 capsule (40 mg) by mouth daily 90 capsule 3     Multiple Vitamins-Minerals (MULTIVITAMIN ADULT PO) Take by mouth daily       Omega-3 Fatty Acids (FISH OIL) 1200 MG CAPS Take 1 capsule by mouth daily       UNABLE TO FIND MEDICATION NAME: calcium 1000 mg daily        Vitamin D, Cholecalciferol, 1000 UNITS CAPS Take 1 tablet by mouth 2 times daily       FLUoxetine (PROZAC) 20 MG capsule Take 1 capsule (20 mg) by mouth daily (Patient not taking: Reported on 7/31/2019) 90 capsule 1     No facility-administered encounter medications on file as of 9/9/2020.        Review Of Systems:  Skin: spot on back  Eyes: negative  Ears/Nose/Throat: negative  Respiratory: No shortness of breath, dyspnea on exertion, cough, or hemoptysis  Cardiovascular: negative  Gastrointestinal: negative  Genitourinary: negative  Musculoskeletal: negative  Neurologic: negative  Psychiatric: negative  Hematologic/Lymphatic/Immunologic: negative  Endocrine: negative      Objective:     /76   LMP 01/18/2018   Eyes: Conjunctivae/lids: Normal   ENT: Lips:  Normal  MSK: Normal  Cardiovascular: Peripheral edema none  Pulm: Breathing Normal  Neuro/Psych: Orientation: A/O x 3. Normal; Mood/Affect: Normal, NAD, WDWN  Pt accompanied by: self  Following areas examined: Scalp, face, eyelids, lips, neck, chest, abdomen, back, and R&L upper and lower  extremities. Pt defers exam of buttock, hips, groin and genitals.   Lepe skin type:ii   Nodes: left axilla, left antecubital NLAD  Findings:  Red smooth well-defined macules and papule on back and on trunk and extremities.  Brown, stuck-on scaly appearing papules on trunk and extremities.  Well circumscribed macules with symmetric color distribution on trunk and extremities.  Tan WD smooth macules on face, neck, trunk, and extremities.  Smooth linear patch on left posterior arm    Assessment and Plan:     1) Cherry angiomas, Seborrheic keratoses, Benign nevi, Lentigines     I discussed the specifics of tumor, prognosis, and genetics of benign lesions.  I explained that treatment of these lesions would be purely cosmetic and not medically neccessary.  I discussed with patient different removal options including excision, cryotherapy, cautery and /or laser.  Lesion may recur and/or may not completely resolve. May need additional treatment.   Cherry angioma/hemangioma on back. Appears benign. Disc removal. Pt defers. Continue to watch and monitor.     2) History of MM  Hx of 0.7mm melanoma on left posterior arm tx by mohs 8/6/19  No evidence of recurrence  Educated and explained how to palpate lymph nodes monthly.   Signs and Symptoms of non-melanoma skin cancer and ABCDEs of melanoma reviewed with patient. Patient encouraged to perform monthly self skin exams and educated on how to perform them. UV precautions reviewed with patient. Patient was asked about new or changing moles/lesions on body.   Wear a sunscreen with at least SPF 30 on your face, ears, neck and V of the chest daily. Wear sunscreen on other areas of the body if those areas are exposed to the sun throughout the day. Sunscreens can contain physical and/or chemical blockers. Physical blockers are less likely to clog pores, these include zinc oxide and titanium dioxide. Reapply every two hour and after swimming. Sunscreen examples include  Neutrogena, CeraVe, Blue Lizard, Elta MD and many others.    Proper skin care from [facility]:    -Eliminate harsh soaps as they strip the natural oils from the skin, often resulting in dry itchy skin ( i.e. Dial, Zest, Guerline Spring)  -Use mild soaps such as Cetaphil or Dove Sensitive Skin in the shower. You do not need to use soap on arms, legs, and trunk every time you shower unless visibly soiled.   -Avoid hot or cold showers.  -After showering, lightly dry off and apply moisturizing within 2-3 minutes. This will help trap moisture in the skin.   -Aggressive use of a moisturizer at least 1-2 times a day to the entire body (including -Vanicream, Cetaphil, Aquaphor or Cerave) and moisturize hands after every washing.  -We recommend using moisturizers that come in a tub that needs to be scooped out, not a pump. This has more of an oil base. It will hold moisture in your skin much better than a water base moisturizer. The above recommended are non-pore clogging.               Follow up in 6 month FBE

## 2020-09-09 NOTE — PATIENT INSTRUCTIONS
Proper skin care from [facility]:    -Eliminate harsh soaps as they strip the natural oils from the skin, often resulting in dry itchy skin ( i.e. Dial, Zest, Guerline Spring)  -Use mild soaps such as Cetaphil or Dove Sensitive Skin in the shower. You do not need to use soap on arms, legs, and trunk every time you shower unless visibly soiled.   -Avoid hot or cold showers.  -After showering, lightly dry off and apply moisturizing within 2-3 minutes. This will help trap moisture in the skin.   -Aggressive use of a moisturizer at least 1-2 times a day to the entire body (including -Vanicream, Cetaphil, Aquaphor or Cerave) and moisturize hands after every washing.  -We recommend using moisturizers that come in a tub that needs to be scooped out, not a pump. This has more of an oil base. It will hold moisture in your skin much better than a water base moisturizer. The above recommended are non-pore clogging.      Wear a sunscreen with at least SPF 30 on your face, ears, neck and V of the chest daily. Wear sunscreen on other areas of the body if those areas are exposed to the sun throughout the day. Sunscreens can contain physical and/or chemical blockers. Physical blockers are less likely to clog pores, these include zinc oxide and titanium dioxide. Reapply every two hour and after swimming. Sunscreen examples include Neutrogena, CeraVe, Blue Lizard, Elta MD and many others.    UV radiation  UVA radiation remains constant throughout the day and throughout the year. It is a longer wavelength than UVB and therefore penetrates deeper into the skin leading to immediate and delayed tanning, photoaging, and skin cancer. 70-80% of UVA and UVB radiation occurs between the hours of 10am-2pm.  UVB radiation  UVB radiation causes the most harmful effects and is more significant during the summer months. However, snow and ice can reflect UVB radiation leading to skin damage during the winter months as well. UVB radiation is  responsible for tanning, burning, inflammation, delayed erythema (pinkness), pigmentation (brown spots), and skin cancer.

## 2020-09-09 NOTE — LETTER
9/9/2020         RE: Alison Chandler  [address]  [facility] [address]        Dear Colleague,    Thank you for referring your patient, Alison Chandler, to [facility]. Please see a copy of my visit note below.    HPI:  Alison Chandler is a 55 year old female patient here today for here today for FBE. hx of 0.7mm MM. Has an itchy spot on back .  Patient states this has been present for years?.  Patient reports the following symptoms: has been itchy a few times .  Patient reports the following previous treatments: none.  Patient reports the following modifying factors: none.  Associated symptoms: none.  Patient has no other skin complaints today.  Remainder of the HPI, Meds, PMH, Allergies, FH, and SH was reviewed in chart.    Pertinent Hx:   Hx of 0.7mm melanoma on left posterior arm tx by mohs 8/6/19  Past Medical History:   Diagnosis Date     Malignant melanoma (H)        No past surgical history on file.     Family History   Problem Relation Age of Onset     Diabetes Mother 50        type 2     Hypertension Mother      Hyperlipidemia Father      Melanoma No family hx of        Social History     Socioeconomic History     Marital status:      Spouse name: Not on file     Number of children: 1     Years of education: Not on file     Highest education level: Bachelor's degree (e.g., BA, AB, BS)   Occupational History     Occupation:      Employer: OTHER     Comment: [employer]   Social Needs     Financial resource strain: Not hard at all     Food insecurity     Worry: Never true     Inability: Never true     Transportation needs     Medical: No     Non-medical: No   Tobacco Use     Smoking status: Never Smoker     Smokeless tobacco: Never Used   Substance and Sexual Activity     Alcohol use: Yes     Frequency: 2-4 times a month     Drinks per session: 1 or 2     Binge frequency: Never     Drug use: No     Sexual  activity: Yes   Lifestyle     Physical activity     Days per week: 1 day     Minutes per session: 30 min     Stress: Only a little   Relationships     Social connections     Talks on phone: More than three times a week     Gets together: Twice a week     Attends Faith service: Never     Active member of club or organization: No     Attends meetings of clubs or organizations: Patient refused     Relationship status:      Intimate partner violence     Fear of current or ex partner: Not on file     Emotionally abused: Not on file     Physically abused: Not on file     Forced sexual activity: Not on file   Other Topics Concern     Parent/sibling w/ CABG, MI or angioplasty before 65F 55M? Not Asked   Social History Narrative     Not on file       Outpatient Encounter Medications as of 9/9/2020   Medication Sig Dispense Refill     FLUoxetine (PROZAC) 40 MG capsule Take 1 capsule (40 mg) by mouth daily 90 capsule 3     Multiple Vitamins-Minerals (MULTIVITAMIN ADULT PO) Take by mouth daily       Omega-3 Fatty Acids (FISH OIL) 1200 MG CAPS Take 1 capsule by mouth daily       UNABLE TO FIND MEDICATION NAME: calcium 1000 mg daily        Vitamin D, Cholecalciferol, 1000 UNITS CAPS Take 1 tablet by mouth 2 times daily       FLUoxetine (PROZAC) 20 MG capsule Take 1 capsule (20 mg) by mouth daily (Patient not taking: Reported on 7/31/2019) 90 capsule 1     No facility-administered encounter medications on file as of 9/9/2020.        Review Of Systems:  Skin: spot on back  Eyes: negative  Ears/Nose/Throat: negative  Respiratory: No shortness of breath, dyspnea on exertion, cough, or hemoptysis  Cardiovascular: negative  Gastrointestinal: negative  Genitourinary: negative  Musculoskeletal: negative  Neurologic: negative  Psychiatric: negative  Hematologic/Lymphatic/Immunologic: negative  Endocrine: negative      Objective:     /76   LMP 01/18/2018   Eyes: Conjunctivae/lids: Normal   ENT: Lips:  Normal  MSK:  Normal  Cardiovascular: Peripheral edema none  Pulm: Breathing Normal  Neuro/Psych: Orientation: A/O x 3. Normal; Mood/Affect: Normal, NAD, WDWN  Pt accompanied by: self  Following areas examined: Scalp, face, eyelids, lips, neck, chest, abdomen, back, and R&L upper and lower extremities. Pt defers exam of buttock, hips, groin and genitals.   Lepe skin type:ii   Nodes: left axilla, left antecubital NLAD  Findings:  Red smooth well-defined macules and papule on back and on trunk and extremities.  Brown, stuck-on scaly appearing papules on trunk and extremities.  Well circumscribed macules with symmetric color distribution on trunk and extremities.  Tan WD smooth macules on face, neck, trunk, and extremities.  Smooth linear patch on left posterior arm    Assessment and Plan:     1) Cherry angiomas, Seborrheic keratoses, Benign nevi, Lentigines     I discussed the specifics of tumor, prognosis, and genetics of benign lesions.  I explained that treatment of these lesions would be purely cosmetic and not medically neccessary.  I discussed with patient different removal options including excision, cryotherapy, cautery and /or laser.  Lesion may recur and/or may not completely resolve. May need additional treatment.   Cherry angioma/hemangioma on back. Appears benign. Disc removal. Pt defers. Continue to watch and monitor.     2) History of MM  Hx of 0.7mm melanoma on left posterior arm tx by mohs 8/6/19  No evidence of recurrence  Educated and explained how to palpate lymph nodes monthly.   Signs and Symptoms of non-melanoma skin cancer and ABCDEs of melanoma reviewed with patient. Patient encouraged to perform monthly self skin exams and educated on how to perform them. UV precautions reviewed with patient. Patient was asked about new or changing moles/lesions on body.   Wear a sunscreen with at least SPF 30 on your face, ears, neck and V of the chest daily. Wear sunscreen on other areas of the body if those areas  are exposed to the sun throughout the day. Sunscreens can contain physical and/or chemical blockers. Physical blockers are less likely to clog pores, these include zinc oxide and titanium dioxide. Reapply every two hour and after swimming. Sunscreen examples include Neutrogena, CeraVe, Blue Lizard, Elta MD and many others.    Proper skin care from [facility]:    -Eliminate harsh soaps as they strip the natural oils from the skin, often resulting in dry itchy skin ( i.e. Dial, Zest, [place] Spring)  -Use mild soaps such as Cetaphil or Dove Sensitive Skin in the shower. You do not need to use soap on arms, legs, and trunk every time you shower unless visibly soiled.   -Avoid hot or cold showers.  -After showering, lightly dry off and apply moisturizing within 2-3 minutes. This will help trap moisture in the skin.   -Aggressive use of a moisturizer at least 1-2 times a day to the entire body (including -Vanicream, Cetaphil, Aquaphor or Cerave) and moisturize hands after every washing.  -We recommend using moisturizers that come in a tub that needs to be scooped out, not a pump. This has more of an oil base. It will hold moisture in your skin much better than a water base moisturizer. The above recommended are non-pore clogging.               Follow up in 6 month FBE      Again, thank you for allowing me to participate in the care of your patient.        Sincerely,        Cate Gregg PA-C

## 2020-09-11 DIAGNOSIS — F33.41 RECURRENT MAJOR DEPRESSIVE DISORDER, IN PARTIAL REMISSION (H): ICD-10-CM

## 2020-09-11 DIAGNOSIS — F41.9 ANXIETY: ICD-10-CM

## 2020-09-11 RX ORDER — FLUOXETINE 40 MG/1
CAPSULE ORAL
Qty: 90 CAPSULE | Refills: 0 | Status: SHIPPED | OUTPATIENT
Start: 2020-09-11 | End: 2020-09-15

## 2020-09-11 NOTE — TELEPHONE ENCOUNTER
Prescription approved per [facility] Refill Protocol.  Kenney Connell RN, BSN

## 2020-09-14 ASSESSMENT — ENCOUNTER SYMPTOMS
SORE THROAT: 0
JOINT SWELLING: 0
NAUSEA: 0
SHORTNESS OF BREATH: 0
PARESTHESIAS: 0
HEARTBURN: 0
HEMATURIA: 0
COUGH: 0
FEVER: 0
NERVOUS/ANXIOUS: 1
DYSURIA: 0
CHILLS: 0
PALPITATIONS: 0
DIARRHEA: 0
EYE PAIN: 0
HEADACHES: 0
HEMATOCHEZIA: 0
DIZZINESS: 0
BREAST MASS: 0
WEAKNESS: 0
CONSTIPATION: 0
ARTHRALGIAS: 0
ABDOMINAL PAIN: 0
MYALGIAS: 0
FREQUENCY: 0

## 2020-09-15 ENCOUNTER — OFFICE VISIT (OUTPATIENT)
Dept: PEDIATRICS | Facility: CLINIC | Age: 55
End: 2020-09-15
Payer: COMMERCIAL

## 2020-09-15 VITALS
BODY MASS INDEX: 35.49 KG/M2 | HEART RATE: 59 BPM | OXYGEN SATURATION: 98 % | RESPIRATION RATE: 20 BRPM | WEIGHT: 253.5 LBS | DIASTOLIC BLOOD PRESSURE: 70 MMHG | TEMPERATURE: 97 F | SYSTOLIC BLOOD PRESSURE: 118 MMHG | HEIGHT: 71 IN

## 2020-09-15 DIAGNOSIS — D03.62 MELANOMA IN SITU OF LEFT UPPER ARM (H): ICD-10-CM

## 2020-09-15 DIAGNOSIS — Z00.00 ROUTINE GENERAL MEDICAL EXAMINATION AT A HEALTH CARE FACILITY: Primary | ICD-10-CM

## 2020-09-15 DIAGNOSIS — G47.9 SLEEP DISTURBANCE: ICD-10-CM

## 2020-09-15 DIAGNOSIS — F41.9 ANXIETY: ICD-10-CM

## 2020-09-15 DIAGNOSIS — F33.41 RECURRENT MAJOR DEPRESSIVE DISORDER, IN PARTIAL REMISSION (H): ICD-10-CM

## 2020-09-15 PROCEDURE — 90471 IMMUNIZATION ADMIN: CPT | Performed by: INTERNAL MEDICINE

## 2020-09-15 PROCEDURE — 99396 PREV VISIT EST AGE 40-64: CPT | Mod: 25 | Performed by: INTERNAL MEDICINE

## 2020-09-15 PROCEDURE — 99214 OFFICE O/P EST MOD 30 MIN: CPT | Mod: 25 | Performed by: INTERNAL MEDICINE

## 2020-09-15 PROCEDURE — 90682 RIV4 VACC RECOMBINANT DNA IM: CPT | Performed by: INTERNAL MEDICINE

## 2020-09-15 RX ORDER — FLUOXETINE 40 MG/1
40 CAPSULE ORAL DAILY
Qty: 90 CAPSULE | Refills: 1 | Status: SHIPPED | OUTPATIENT
Start: 2020-09-15 | End: 2021-09-15

## 2020-09-15 RX ORDER — FLUOXETINE 40 MG/1
40 CAPSULE ORAL DAILY
Qty: 90 CAPSULE | Refills: 3 | Status: CANCELLED | OUTPATIENT
Start: 2020-09-15

## 2020-09-15 ASSESSMENT — ENCOUNTER SYMPTOMS
WEAKNESS: 0
COUGH: 0
NERVOUS/ANXIOUS: 1
SHORTNESS OF BREATH: 0
EYE PAIN: 0
SORE THROAT: 0
PALPITATIONS: 0
HEMATOCHEZIA: 0
BREAST MASS: 0
NAUSEA: 0
DIZZINESS: 0
FREQUENCY: 0
HEMATURIA: 0
DYSURIA: 0
MYALGIAS: 0
ARTHRALGIAS: 0
CONSTIPATION: 0
HEARTBURN: 0
HEADACHES: 0
CHILLS: 0
PARESTHESIAS: 0
ABDOMINAL PAIN: 0
FEVER: 0
JOINT SWELLING: 0
DIARRHEA: 0

## 2020-09-15 ASSESSMENT — MIFFLIN-ST. JEOR: SCORE: 1843.55

## 2020-09-15 NOTE — PATIENT INSTRUCTIONS
"  Patient Education   Tips for Sleep Hygiene  \"Sleep hygiene\" means having good sleep habits.Follow these tips to sleep better at night:     Get on a schedule. Go to bed and get up at about the same time every day.    Listen to your body. Only try to sleep when you actually feel tired or sleepy.    Be patient. If you haven't been able to get to sleep after about 30 minutes or more, get up and do something calming or boring until you feel sleepy. Then return to bed and try again.    Don't have caffeine (coffee, tea, cola drinks, chocolate and some medicines), alcohol or nicotine (cigarettes). These can make it harder for you to fall asleep and stay asleep.    Use your bed for sleeping only. That means no TV, computer or homework in bed, especially during the evening and before bedtime.    Don't nap during the day. If you must nap, make sure it is for less than 20 minutes.    Create sleep rituals that remind your body it is time to sleep. Examples include breathing exercises, stretching or reading a book.    Avoid all electronic media (smart phone, computer, tablet) within 2 hours of bed time. The \"blue light\" in these devices activates the part of the brain that keeps you awake.    Dim the lights at night.    Get early morning sources of light (walk in the sunshine) to help set sleep patterns at night.    Try a bath or shower before bed. Having a warm bath 1 to 2 hours before bedtime can help you feel sleepy. Hot baths can make you alert, so be mindful of the temperature.    Don't watch the clock. Checking the clock during the night can wake you up. It can also lead to negative thoughts such as, \"I will never fall asleep,\" which can increase anxiety and sleeplessness.    Use a sleep diary. Track your sleep schedule to know your sleep patterns and to see where you can improve.    Get regular exercise every day. Try not to do heavy exercise in the 4 hours before bedtime.    Eat a healthy, balanced diet.    Try eating a " light, healthy snack before bed, but avoid eating a heavy meal.    Create the right sleeping area. A cool, dark, quiet room is best. If needed, try earplugs, fans and blackout curtains.    Keep your daytime routine the same even if you have a bad night sleep. Avoiding activities the next day can make it harder to sleep.  For informational purposes only. Not to replace the advice of your health care provider.   Copyright   2013 [employer]. All rights reserved. [employer] 562581 - 01/16.         Please call the [facility] at [facility] in [place] to set up an appointment.   [phone]  My favorite is Dr. Cheung.    Increase fluoxetine to 60 mg. Send a [employer] message in 1 month to let me know how you are doing with stress. If it's not getting you where you want to be, I'd taper you over to lexapro.

## 2020-09-15 NOTE — PROGRESS NOTES
SUBJECTIVE:   CC: Alison Chandler is an 55 year old woman who presents for preventive health visit.     Healthy Habits:     Getting at least 3 servings of Calcium per day:  Yes    Bi-annual eye exam:  Yes    Dental care twice a year:  Yes    Sleep apnea or symptoms of sleep apnea:  None    Diet:  Regular (no restrictions)    Frequency of exercise:  1 day/week    Duration of exercise:  15-30 minutes    Taking medications regularly:  Yes    Medication side effects:  None    PHQ-2 Total Score: 0    Additional concerns today:  Yes    Got a fitbit at the beginning of covid pandemic and has been getting data for free, also has physical . Has been paddleboarding, doing core strengthening.       Depression and Anxiety Follow-Up    How are you doing with your depression since your last visit? Improved     How are you doing with your anxiety since your last visit?  Worsened stress at work, being coped up, works from home and likes that, more expectations from work    Are you having other symptoms that might be associated with depression or anxiety? No    Have you had a significant life event? No     Do you have any concerns with your use of alcohol or other drugs? 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 Thinks the exercise is helping. Not sleeping well she always thought, fitbit tells her she isn't getting great sleep. If she keeps her sleep routine.     Social History     Tobacco Use     Smoking status: Never Smoker     Smokeless tobacco: Never Used   Substance Use Topics     Alcohol use: Yes     Frequency: 2-4 times a month     Drinks per session: 1 or 2     Binge frequency: Never     Drug use: No     PHQ 2/24/2019 5/28/2019 6/22/2020   PHQ-9 Total Score 1 0 3   Q9: Thoughts of better off dead/self-harm past 2 weeks Not at all Not at all Not at all     ROBI-7 SCORE 1/29/2019 2/15/2019 2/24/2019   Total Score 0 0 0     Last PHQ-9 6/22/2020   1.  Little interest or pleasure in doing things 1    2.  Feeling down, depressed, or hopeless 0   3.  Trouble falling or staying asleep, or sleeping too much 1   4.  Feeling tired or having little energy 0   5.  Poor appetite or overeating 1   6.  Feeling bad about yourself 0   7.  Trouble concentrating 0   8.  Moving slowly or restless 0   Q9: Thoughts of better off dead/self-harm past 2 weeks 0   PHQ-9 Total Score 3   Difficulty at work, home, or with people Not difficult at all     ROBI-7  2/24/2019   1. Feeling nervous, anxious, or on edge 0   2. Not being able to stop or control worrying 0   3. Worrying too much about different things 0   4. Trouble relaxing 0   5. Being so restless that it is hard to sit still 0   6. Becoming easily annoyed or irritable 0   7. Feeling afraid, as if something awful might happen 0   ROBI-7 Total Score 0   If you checked any problems, how difficult have they made it for you to do your work, take care of things at home, or get along with other people? -       Suicide Assessment Five-step Evaluation and Treatment (SAFE-T)      Today's PHQ-2 Score:   PHQ-2 ( 1999 Pfizer) 9/14/2020   Q1: Little interest or pleasure in doing things 0   Q2: Feeling down, depressed or hopeless 0   PHQ-2 Score 0   Q1: Little interest or pleasure in doing things Not at all   Q2: Feeling down, depressed or hopeless Not at all   PHQ-2 Score 0       Abuse: Current or Past (Physical, Sexual or Emotional) - No  Do you feel safe in your environment? Yes    Have you ever done Advance Care Planning? (For example, a Health Directive, POLST, or a discussion with a medical provider or your loved ones about your wishes): Yes, patient states has an Advance Care Planning document and will bring a copy to the clinic.    Social History     Tobacco Use     Smoking status: Never Smoker     Smokeless tobacco: Never Used   Substance Use Topics     Alcohol use: Yes     Frequency: 2-4 times a month     Drinks per session: 1 or 2     Binge frequency: Never         Alcohol Use  9/14/2020   Prescreen: >3 drinks/day or >7 drinks/week? Not Applicable   Prescreen: >3 drinks/day or >7 drinks/week? -       Reviewed orders with patient.  Reviewed health maintenance and updated orders accordingly - Yes  Labs reviewed in EPIC    Mammogram Screening: Patient over age 50, mutual decision to screen reflected in health maintenance.    Pertinent mammograms are reviewed under the imaging tab.  History of abnormal Pap smear:   Last 3 Pap and HPV Results:   PAP / HPV Latest Ref Rng & Units 5/28/2019   PAP - NIL   HPV 16 DNA NEG:Negative Negative   HPV 18 DNA NEG:Negative Negative   OTHER HR HPV NEG:Negative Negative     PAP / HPV Latest Ref Rng & Units 5/28/2019   PAP - NIL   HPV 16 DNA NEG:Negative Negative   HPV 18 DNA NEG:Negative Negative   OTHER HR HPV NEG:Negative Negative     Reviewed and updated as needed this visit by clinical staff  Tobacco  Allergies  Med Hx  Surg Hx  Fam Hx  Soc Hx        Reviewed and updated as needed this visit by Provider            Review of Systems   Constitutional: Negative for chills and fever.   HENT: Positive for hearing loss. Negative for congestion, ear pain and sore throat.    Eyes: Negative for pain and visual disturbance.   Respiratory: Negative for cough and shortness of breath.    Cardiovascular: Negative for chest pain, palpitations and peripheral edema.   Gastrointestinal: Negative for abdominal pain, constipation, diarrhea, heartburn, hematochezia and nausea.   Breasts:  Negative for tenderness, breast mass and discharge.   Genitourinary: Negative for dysuria, frequency, genital sores, hematuria, pelvic pain, urgency, vaginal bleeding and vaginal discharge.   Musculoskeletal: Negative for arthralgias, joint swelling and myalgias.   Skin: Negative for rash.   Neurological: Negative for dizziness, weakness, headaches and paresthesias.   Psychiatric/Behavioral: Negative for mood changes. The patient is nervous/anxious.      CONSTITUTIONAL: NEGATIVE for  "fever, chills, change in weight  INTEGUMENTARY/SKIN: NEGATIVE for worrisome rashes, moles or lesions  EYES: NEGATIVE for vision changes or irritation  ENT: NEGATIVE for ear, mouth and throat problems  RESP: NEGATIVE for significant cough or SOB  BREAST: NEGATIVE for masses, tenderness or discharge  CV: NEGATIVE for chest pain, palpitations or peripheral edema  GI: NEGATIVE for nausea, abdominal pain, heartburn, or change in bowel habits  : NEGATIVE for unusual urinary or vaginal symptoms. No vaginal bleeding.  MUSCULOSKELETAL: NEGATIVE for significant arthralgias or myalgia  NEURO: NEGATIVE for weakness, dizziness or paresthesias  PSYCHIATRIC: NEGATIVE for changes in mood or affect      OBJECTIVE:   /70 (BP Location: Right arm, Patient Position: Sitting, Cuff Size: Adult Large)   Pulse 59   Temp 97  F (36.1  C) (Tympanic)   Resp 20   Ht 1.807 m (5' 11.16\")   Wt 115 kg (253 lb 8 oz)   LMP 01/18/2018   SpO2 98%   BMI 35.20 kg/m    Physical Exam  GENERAL: healthy, alert and no distress  EYES: Eyes grossly normal to inspection, PERRL and conjunctivae and sclerae normal  HENT: ear canals and TM's normal, nose and mouth without ulcers or lesions  NECK: no adenopathy, no asymmetry, masses, or scars and thyroid normal to palpation  RESP: lungs clear to auscultation - no rales, rhonchi or wheezes  BREAST: normal without masses, tenderness or nipple discharge and no palpable axillary masses or adenopathy  CV: regular rate and rhythm, normal S1 S2, no S3 or S4, no murmur, click or rub, no peripheral edema and peripheral pulses strong  ABDOMEN: soft, nontender, no hepatosplenomegaly, no masses and bowel sounds normal  MS: no gross musculoskeletal defects noted, no edema  SKIN: no suspicious lesions or rashes  NEURO: Normal strength and tone, mentation intact and speech normal  PSYCH: mentation appears normal, affect normal/bright    Diagnostic Test Results:  Labs reviewed in Epic    ASSESSMENT/PLAN:   1. Routine " "general medical examination at a health care facility      2. Recurrent major depressive disorder, in partial remission (H)  Inadequate control during pandemic. Plan increase to 60 mg. She will let me know in 1 month how she is doing with [employer] message.   - FLUoxetine (PROZAC) 40 MG capsule; Take 1 capsule (40 mg) by mouth daily  Dispense: 90 capsule; Refill: 1    3. Anxiety  As above.  - FLUoxetine (PROZAC) 20 MG capsule; Take 1 capsule (20 mg) by mouth daily With 40 mg capsule to equal 60 mg dose.  Dispense: 30 capsule; Refill: 1  - FLUoxetine (PROZAC) 40 MG capsule; Take 1 capsule (40 mg) by mouth daily  Dispense: 90 capsule; Refill: 1    4. Sleep disturbance  Noticed on her fitbit. 80% effective sleep. Doesn't think she snores. Discussed further evaluation with sleep study is an option.  - SLEEP EVALUATION & MANAGEMENT REFERRAL - ADULT -[facility] [place]  [phone] (Age 18 and up); Future    5. Melanoma in situ of left upper arm (H)  Working with derm, now seeing every 6 months.       COUNSELING:  Reviewed preventive health counseling, as reflected in patient instructions    Estimated body mass index is 35.2 kg/m  as calculated from the following:    Height as of this encounter: 1.807 m (5' 11.16\").    Weight as of this encounter: 115 kg (253 lb 8 oz).    Weight management plan: Discussed healthy diet and exercise guidelines    She reports that she has never smoked. She has never used smokeless tobacco.      Counseling Resources:  ATP IV Guidelines  Pooled Cohorts Equation Calculator  Breast Cancer Risk Calculator  BRCA-Related Cancer Risk Assessment: FHS-7 Tool  FRAX Risk Assessment  ICSI Preventive Guidelines  Dietary Guidelines for Americans, 2010  USDA's MyPlate  ASA Prophylaxis  Lung CA Screening    Nia Arshad MD  [facility]ON[facility]  "

## 2020-10-06 VITALS — HEIGHT: 72 IN | BODY MASS INDEX: 33.32 KG/M2 | WEIGHT: 246 LBS

## 2020-10-06 ASSESSMENT — MIFFLIN-ST. JEOR: SCORE: 1822.85

## 2020-10-06 NOTE — PATIENT INSTRUCTIONS
See below for some additional information regarding CBTI.     In the meantime/as you await your visit with Dr. Arcos I recommend you try to optimize your sleep hygiene to the best of your ability including keeping a consistent sleep schedule, avoid looking at the clock in the middle of the night when you wake, minimizing electronics use in the last few hours before bed (stop scrolling on your phone in bed) and stopping caffeine in evening hours.         Insomnia and Behavioral Sleep Medicine Program    The [facility] Insomnia and Behavioral Sleep Medicine Program provides evidence-based non-drug treatment including:      Cognitive-behavioral Therapies for Insomnia (CBT-I)    Management of Shift-work and Jet Lag    Management of Delayed, Advanced and Irregular Circadian Rhythm Sleep Disorders    Frequently Asked Questions:    What is CBT-I?    Cognitive Behavioral Therapy for Insomnia, also known as CBT-I, is a highly effective non-drug treatment for insomnia. The American College of Physicians recommends CBT-I as the first treatment for chronic insomnia.  Research has shown CBT-I to be safer and more effective long term than sleeping pills.    What does CBT-I involve?     CBT-I targets behaviors that lead to chronic insomnia:    Habits that weaken the bed as a cue for sleep    Habits that weaken your body's sleep drive and sleep/wake clock     Unhelpful sleep thoughts that increase sleep-related worry and arousal.    The process works like a 4-6 session training program that provides you with the information and coaching needed to implement proven strategies to get a better night's sleep.    People often see improvement in their sleep within a few weeks. Research shows if you keep practicing the skills you learn your sleep is likely to continue to improve 6-12 months after treatment.    Does this program prescribe or manage sleep medication?    No.  Your prescribing provider is responsible to assist you in  managing your sleep medications.  Some people choose to stop using sleep medication prior to or during CBT-I.  Our program can work with your prescribing providers to help reduce or eliminate use of sleep medications. Always talk with you prescribing provider before making any changes to your medication.     Preparing for your Insomnia Evaluation:    The [facility] Insomnia and Behavioral Sleep Medicine Program offers treatment at our [facility] as well as certain primary and specialty care clinics.  You can also receive care from the convenience of your own home through Telehealth visits or a guided internet CBT-I option.     If you have never been seen at an [facility], you will need to complete the Insomnia Health Questionnaire prior to your initial visit.  Click or copy the following link into your browser to complete: [email or web address]    Complete a daily Sleep Diary by downloading the free sleep tello CBTi- tello, enter your sleep data in the My Sleep section daily and have available for your visit. You can also use a paper diary which you can return by Path message, by email to [email or web address], or by fax to [phone].    Questions?      Call [phone] or email us at [email or web address]         Your BMI is Body mass index is 33.36 kg/m .  Weight management is a personal decision.  If you are interested in exploring weight loss strategies, the following discussion covers the approaches that may be successful. Body mass index (BMI) is one way to tell whether you are at a healthy weight, overweight, or obese. It measures your weight in relation to your height.  A BMI of 18.5 to 24.9 is in the healthy range. A person with a BMI of 25 to 29.9 is considered overweight, and someone with a BMI of 30 or greater is considered obese. More than two-thirds of American adults are considered overweight or obese.  Being overweight or obese increases the risk for  further weight gain. Excess weight may lead to heart disease and diabetes.  Creating and following plans for healthy eating and physical activity may help you improve your health.  Weight control is part of healthy lifestyle and includes exercise, emotional health, and healthy eating habits. Careful eating habits lifelong are the mainstay of weight control. Though there are significant health benefits from weight loss, long-term weight loss with diet alone may be very difficult to achieve- studies show long-term success with dietary management in less than 10% of people. Attaining a healthy weight may be especially difficult to achieve in those with severe obesity. In some cases, medications, devices and surgical management might be considered.  What can you do?  If you are overweight or obese and are interested in methods for weight loss, you should discuss this with your provider.     Consider reducing daily calorie intake by 500 calories.     Keep a food journal.     Avoiding skipping meals, consider cutting portions instead.    Diet combined with exercise helps maintain muscle while optimizing fat loss. Strength training is particularly important for building and maintaining muscle mass. Exercise helps reduce stress, increase energy, and improves fitness. Increasing exercise without diet control, however, may not burn enough calories to loose weight.       Start walking three days a week 10-20 minutes at a time    Work towards walking thirty minutes five days a week     Eventually, increase the speed of your walking for 1-2 minutes at time    In addition, we recommend that you review healthy lifestyles and methods for weight loss available through the National Institutes of Health patient information sites:  http://win.niddk.nih.gov/publications/index.htm    And look into health and wellness programs that may be available through your health insurance provider, employer, local community center, or [place]  club.    Weight management plan: Patient was referred to their PCP to discuss a diet and exercise plan.

## 2020-10-06 NOTE — PROGRESS NOTES
"Alison Chandler is a 55 year old female who is being evaluated via a billable video visit.      The patient has been notified of following:     \"This video visit will be conducted via a call between you and your physician/provider. We have found that certain health care needs can be provided without the need for an in-person physical exam.  This service lets us provide the care you need with a video conversation.  If a prescription is necessary we can send it directly to your pharmacy.  If lab work is needed we can place an order for that and you can then stop by our lab to have the test done at a later time.    Video visits are billed at different rates depending on your insurance coverage.  Please reach out to your insurance provider with any questions.    If during the course of the call the physician/provider feels a video visit is not appropriate, you will not be charged for this service.\"    Patient has given verbal consent for Video visit? Yes  How would you like to obtain your AVS? MyChart  If you are dropped from the video visit, the video invite should be resent to: Text to cell phone: [phone]  Will anyone else be joining your video visit? No        Video-Visit Details    Type of service:  Video Visit    Video Start Time: 8:04AM  Video End Time: 8:47AM    Originating Location (pt. Location): Home    Distant Location (provider location):  [facility]     Platform used for Video Visit: Elian Antonio PA-C    [facility]   Outpatient Sleep Medicine Consultation  October 6, 2020      Name: Alison Chandler MRN# [id number]   Age: 55 year old YOB: 1965     Date of Consultation: October 6, 2020  Consultation is requested by: Nia Arshad MD  [address] [place] [place] [address] Nia Arshad  Primary care provider: Nia Arshad       Chief Complaint / Reason for Sleep Consult:     \"I have " "difficulty falling asleep. I am restless at night. When I wake up in the middle of the might, I have trouble getting back to sleep. I am concerned I'm not getting the proper sleep for my body's immune system to stay healthy.\"         History of Present Illness:     Alison Chandler is a 55 year old female who presents to the clinic for evaluation of insomnia. Other past medical history significant for anxiety, depression, and melanoma s/p excision.      Patient presents with a 4 year history of insomnia that has become progressive with COVID19 pandemic. Difficulty with both sleep initiation and sleep maintenance. Symptoms started back in 2016 when she returned to working full time and were more or less manageable - \"wasn't as obvious\", but have worsened associated with her increased anxiety. Recently increased her Prozac dose to help treat her anxiety. Has a FitBit that shows \"very restless\" sleep. States her sleep score is often in \"upper 70's\".    Estimates she is getting about 7 hours of sleep per night. On weekdays, gets into bed at 9:30PM and will either scroll on her phone, listen to audio books, turn on a 15 minute sleep meditation, or white noise tello of [employer] until 10:15-10:30PM when she becomes drowsy and will shut things down\". Once she turns things off will fall asleep in anywhere from 10 minutes to 1 hour - \"it depends what is going on in my head and what has happened during the day\". Wakes at 6:30AM with an alarm. On weekends her sleep schedule is delayed 1 hour with bedtime of 10:30PM and wake time 7:30AM. Wakes typically once per night to use the restroom and then an additional 5-6 times per night for various reasons - \"to roll over and change positions or hot flashes are a thing or from dreams\".     Denies any planned daytime naps. Used to take \"power naps\" for 20 min but not in many months, did wake feeling refreshed when she did this. Denies inadvertent dozing. Denies any history of " "falling asleep or dozing while driving. Denies driving accident or near miss due to sleepiness/drowsniess while driving. Patient was counseled on the importance of driving while alert, to pull over if drowsy, or nap before getting into the vehicle if sleepy.    Denies current snoring, reports history of snoring when she was at a higher weight. Denies witnessed apneas. Denies gasp/choking. Sleeps on sides and stomach. Reports nocturia x1 as above.  Denies nocturnal GERD. Denies morning headaches or confusion.  Denies morning dry mouth.  Denies nasal/sinus congestion.    Patient denies any abnormal movements or behaviors in their sleep. No dream enactment behavior. No somniloquy. No somnambulism.  No sleep related eating. No recurring nightmares or night terrors, though reports history of nightmares in childhood but not any recently.  Denies bruxism. Denies sleep paralysis. Does have vivid dreams at times.     Denies typical restless legs syndrome symptoms (creepy crawly, discomfort, urge to move, etc) but notes she can have \"twitchy\" legs if she is dehydrated at times and also states \"I always feel like I need to have my legs splayed like a dog does where my knee is out at a 90 degree angle from my hip joint to be comfortable when I sleep\". Has never been told that she kicks her legs at night and is unaware of any leg movements, sheets are \"not that messed up\" in the morning.     SCALES       SLEEP APNEA: Stopbang score  1/8       INSOMNIA:  Insomnia severity score: 18/28       SLEEPINESS: Canterbury sleepiness scale: 1 [normal < 11]          Medications:     Current Outpatient Medications   Medication Sig     FLUoxetine (PROZAC) 20 MG capsule Take 1 capsule (20 mg) by mouth daily With 40 mg capsule to equal 60 mg dose.     FLUoxetine (PROZAC) 40 MG capsule Take 1 capsule (40 mg) by mouth daily     Multiple Vitamins-Minerals (MULTIVITAMIN ADULT PO) Take by mouth daily     Omega-3 Fatty Acids (FISH OIL) 1200 MG CAPS Take " "1 capsule by mouth daily     UNABLE TO FIND MEDICATION NAME: calcium 1000 mg daily      Vitamin D, Cholecalciferol, 1000 UNITS CAPS Take 1 tablet by mouth 2 times daily     No current facility-administered medications for this visit.              Allergies:     No Known Allergies         Past Medical History:     Past Medical History:   Diagnosis Date     Depressive disorder      Malignant melanoma (H)              Past Surgical History:    Previous upper airway surgery: denies   No past surgical history on file.         Social History:     Social History     Tobacco Use     Smoking status: Never Smoker     Smokeless tobacco: Never Used   Substance Use Topics     Alcohol use: Yes     Frequency: 2-4 times a month     Drinks per session: 1 or 2     Binge frequency: Never     Chemical History:  Alcohol use: Socially, one or two hard alcohol drinks every 2-4 weeks; denies use as a sleep aid     Tobacco use: Denies   Illicit substances: Denies  Caffeine intake: Drinks 2 \"big cups\" of coffee per day. Will also drink Monica drinks that have 35-55mg caffeine in them. Last caffeine intake is usually before 3:00PM but can drink Monica up until bedtime           Family History:     Family History   Problem Relation Age of Onset     Diabetes Mother 50        type 2     Hypertension Mother      Hyperlipidemia Father      Melanoma No family hx of       Sleep Family Hx: Patient's mother snores but no know SHIN. Denies any known family history of sleep apnea, restless legs syndrome, narcolepsy, insomnia, or parasomnias.          Review of Systems:   CONSTITUTIONAL:  POSITIVE for  night sweats and NEGATIVE for  fever , chills and weight change  EYES: NEGATIVE for changes in vision, blind spots, double vision.  ENT: NEGATIVE for ear pain, sore throat, sinus pain, post-nasal drip, runny nose, bloody nose  CARDIAC: NEGATIVE for fast heartbeats or fluttering in chest, chest pain or pressure, breathlessness when lying flat, swollen legs or " swollen feet.  NEUROLOGIC: NEGATIVE headaches, weakness or numbness in the arms or legs.  DERMATOLOGIC: NEGATIVE for rashes, new moles or change in mole(s)  PULMONARY: NEGATIVE SOB at rest, SOB with activity, dry cough, productive cough, coughing up blood, wheezing or whistling when breathing.    GASTROINTESTINAL: NEGATIVE for nausea or vomitting, loose or watery stools, fat or grease in stools, constipation, abdominal pain, bowel movements black in color or blood noted.  GENITOURINARY: NEGATIVE for pain during urination, blood in urine, urinating more frequently than usual, irregular menstrual periods.  MUSCULOSKELETAL: NEGATIVE for muscle pain, bone or joint pain, swollen joints.  ENDOCRINE: NEGATIVE for increased thirst or urination, diabetes.  LYMPHATIC: NEGATIVE for swollen lymph nodes, lumps or bumps in the breasts or nipple discharge.  PSYCH: POSITIVE for anxiety and NEGATIVE for depression, suicidal/homicidal ideation, and hallucinations.          Physical Examination:   Ht 1.829 m (6')   Wt 111.6 kg (246 lb)   LMP 01/18/2018   BMI 33.36 kg/m    General appearance: Awake, alert, cooperative. Well groomed. Sitting comfortably in chair. In no apparent distress.  HEENT: Head: Normocephalic, atraumatic. Eyes:Conjunctiva clear. Sclera normal. Nose: External appearance without deformity.   Neck: No visible thyroid enlargement.   Cardiovascular: No JVD  Pulmonary:  Able to speak easily in full sentences. No cough or wheeze.   Skin:  No rashes or significant lesions on visible skin.   Neurologic: Alert, oriented x3.   Psychiatric: Mood euthymic. Affect congruent with full range and intensity.          Data: All pertinent previous laboratory data reviewed     No results found for: PH, PHARTERIAL, PO2, [id number], SAT, PCO2, HCO3, BASEEXCESS, JONA, BEB  Lab Results   Component Value Date    TSH 2.00 03/01/2017    TSH 2.500 02/05/2016     Lab Results   Component Value Date    GLC 97 05/28/2019    GLC 99 03/01/2017  "    No results found for: HGB  Lab Results   Component Value Date    BUN 16 03/01/2017    CR 0.75 03/01/2017     Lab Results   Component Value Date    AST 17 03/01/2017    ALT 36 03/01/2017    ALKPHOS 58 03/01/2017    BILITOTAL 0.5 03/01/2017     No results found for: UAMP, UBARB, BENZODIAZEUR, UCANN, UCOC, OPIT, UPCP         Assessment and Plan:   1. Insomnia, unspecified type  2. Recurrent major depressive disorder, in partial remission (H)  3. Anxiety    Patient presents with a 4 year history of sleep initiation and sleep maintenance difficulties and \"restless sleep\" that has become progressive over the past few months associated with COVID pandemic and increased anxiety levels. Discussed whether or not pursing a sleep study at this time would be of any benefit and agreed not to pursue as she does not endorse any symptoms suspicious of sleep disordered breathing (history of snoring at higher weight but denies current) or symptoms concerning of sleep movement/behavior disorder. Discussed some ways in which she can improve her sleep hygiene including avoid looking at the clock, minimizing electronics use in the last few hours before bed, and stopping caffeine in evening hours. Discussed that Makayla's insomnia may respond very well to cognitive behavioral therapy for insomnia. Makayla has completed CBT in the past for her depression and is open/willing to pursue this for her insomnia as well. Referral given to sleep psychology today to further continue her education on sleep strategies. Educational materials provided in instructions.    Return to clinic as needed or with any new/worsening of symptoms.     Copy to: Nia Arshad PA-C  Oct 7, 2020     [facility]  [address] , [place] [address]     [facility]  [address] [address]    Chart documentation was completed, in part, with Dragon voice-recognition " software. Even though reviewed, some grammatical, spelling, and word errors may remain.

## 2020-10-07 ENCOUNTER — VIRTUAL VISIT (OUTPATIENT)
Dept: SLEEP MEDICINE | Facility: CLINIC | Age: 55
End: 2020-10-07
Attending: INTERNAL MEDICINE
Payer: COMMERCIAL

## 2020-10-07 DIAGNOSIS — F41.9 ANXIETY: ICD-10-CM

## 2020-10-07 DIAGNOSIS — F33.41 RECURRENT MAJOR DEPRESSIVE DISORDER, IN PARTIAL REMISSION (H): ICD-10-CM

## 2020-10-07 DIAGNOSIS — G47.00 INSOMNIA, UNSPECIFIED TYPE: Primary | ICD-10-CM

## 2020-10-07 PROCEDURE — 99203 OFFICE O/P NEW LOW 30 MIN: CPT | Mod: GT | Performed by: PHYSICIAN ASSISTANT

## 2020-10-13 ENCOUNTER — MYC MEDICAL ADVICE (OUTPATIENT)
Dept: PEDIATRICS | Facility: CLINIC | Age: 55
End: 2020-10-13

## 2020-10-13 DIAGNOSIS — F41.9 ANXIETY: ICD-10-CM

## 2020-12-05 ENCOUNTER — MYC MEDICAL ADVICE (OUTPATIENT)
Dept: PEDIATRICS | Facility: CLINIC | Age: 55
End: 2020-12-05

## 2020-12-05 DIAGNOSIS — Z13.6 CARDIOVASCULAR SCREENING; LDL GOAL LESS THAN 160: Primary | ICD-10-CM

## 2020-12-05 DIAGNOSIS — R00.2 PALPITATIONS: ICD-10-CM

## 2020-12-05 DIAGNOSIS — Z82.49 FAMILY HISTORY OF HYPERTROPHIC CARDIOMYOPATHY: ICD-10-CM

## 2020-12-08 NOTE — TELEPHONE ENCOUNTER
Ordered. Await results.  Given symptoms of palpitations, I want her to get EKG done. Should have visit to discuss palpitations and dizzy spells. Can do EKG at that visit, but also makes sense to do in echo lab, if possible. I'll order future EKG, pls see if echo lab can do this at the same time.  Nia Arshad M.D.

## 2020-12-17 NOTE — TELEPHONE ENCOUNTER
Patient has ECHO scheduled for 12/31/2020. Closing encounter.    Varun Marquez at 11:06 AM on 12/17/2020  [facility]  Phone [phone]

## 2020-12-31 ENCOUNTER — HOSPITAL ENCOUNTER (OUTPATIENT)
Dept: CARDIOLOGY | Facility: CLINIC | Age: 55
End: 2020-12-31
Attending: INTERNAL MEDICINE
Payer: COMMERCIAL

## 2020-12-31 DIAGNOSIS — Z82.49 FAMILY HISTORY OF HYPERTROPHIC CARDIOMYOPATHY: ICD-10-CM

## 2020-12-31 PROCEDURE — 93306 TTE W/DOPPLER COMPLETE: CPT | Mod: 26 | Performed by: INTERNAL MEDICINE

## 2020-12-31 PROCEDURE — 93306 TTE W/DOPPLER COMPLETE: CPT

## 2020-12-31 PROCEDURE — 93010 ELECTROCARDIOGRAM REPORT: CPT | Performed by: INTERNAL MEDICINE

## 2020-12-31 PROCEDURE — 93005 ELECTROCARDIOGRAM TRACING: CPT

## 2021-01-07 LAB — INTERPRETATION ECG - MUSE: NORMAL

## 2021-01-25 ENCOUNTER — OFFICE VISIT (OUTPATIENT)
Dept: PEDIATRICS | Facility: CLINIC | Age: 56
End: 2021-01-25
Payer: COMMERCIAL

## 2021-01-25 VITALS
BODY MASS INDEX: 34.38 KG/M2 | RESPIRATION RATE: 20 BRPM | SYSTOLIC BLOOD PRESSURE: 98 MMHG | OXYGEN SATURATION: 99 % | DIASTOLIC BLOOD PRESSURE: 84 MMHG | HEART RATE: 57 BPM | HEIGHT: 72 IN | WEIGHT: 253.8 LBS | TEMPERATURE: 97.4 F

## 2021-01-25 DIAGNOSIS — Z82.49 FAMILY HISTORY OF HYPERTROPHIC CARDIOMYOPATHY: ICD-10-CM

## 2021-01-25 DIAGNOSIS — I83.813 VARICOSE VEINS OF BILATERAL LOWER EXTREMITIES WITH PAIN: ICD-10-CM

## 2021-01-25 DIAGNOSIS — F33.41 RECURRENT MAJOR DEPRESSIVE DISORDER, IN PARTIAL REMISSION (H): ICD-10-CM

## 2021-01-25 DIAGNOSIS — R00.2 PALPITATIONS: Primary | ICD-10-CM

## 2021-01-25 PROCEDURE — 99213 OFFICE O/P EST LOW 20 MIN: CPT | Performed by: INTERNAL MEDICINE

## 2021-01-25 ASSESSMENT — MIFFLIN-ST. JEOR: SCORE: 1858.23

## 2021-01-25 NOTE — PATIENT INSTRUCTIONS
Makayla,     So good to see you today.    Let me know if your palpitations change or worsen.    For your leg veins, consider JOBST 15-20  mmhg pressure. Look for 8-12 mmhg pressure if available. Most medical supply stores carry and I'm happy to do a rx if it would be helpful.    I hope you and your family stay safe and healthy,  Nia Arshad M.D.

## 2021-01-25 NOTE — PROGRESS NOTES
Assessment & Plan     Palpitations  Offered holter monitor to evaluate more frequent symptoms, sound like PACs or PVC's. Usually isolated. Given these have been present and essentially stable for years, will defer further evaluation of this. She will let me know if these increase. Regarding her episodes of heart racing, discussed that ideally we would catch an episode on an event monitor, but given infrequent nature of these symptoms, seems likely we will not catch an event even with a 30 day monitor. Consider SVT vs ventricular ectopy. She will let me know if worsens.    Family history of hypertrophic cardiomyopathy  Reassurance given, ECHO and EKG normal.     Varicose veins of bilateral lower extremities with pain  History of saphenous vein ablation, symptoms and vein enlargement returning. Hasn't tolerated her previous 30mmHg compression socks. Discussed doing lower compression to prevent progression.    Recurrent major depressive disorder, in partial remission (H)  Stable, reviewed medication list.     25 minutes spent on the date of the encounter doing chart review, history and exam, documentation and further activities as noted above       See Patient Instructions    No follow-ups on file.    Nia Arshad MD  [facility] JONH Benavides is a 55 year old who presents to clinic today for the following health issues:    HPI     Mom was dx with cardiomyopathy on 12/5/2020. She called in to ask what to do. Set up for echo and EKG with follow up today.  Discuss recent EKG and Echo    Occasional palpitations. For years has had very brief, few seconds episode of feeling a sudden prominent heartbeat, then everything normal again. Happens maybe once a day. Not associated with other symptoms. Also describes episodes maybe every 6 weeks of about 15-30 seconds of rapid heart rate. Always resolves spontaneously. Sometimes makes her feel like she has to cough. Not associated with nausea,  other chest symptoms.    Depression and Anxiety Follow-Up    How are you doing with your depression since your last visit? Improved gone    How are you doing with your anxiety since your last visit?  Improved     Are you having other symptoms that might be associated with depression or anxiety? No    Have you had a significant life event? No     Do you have any concerns with your use of alcohol or other drugs? No    Social History     Tobacco Use     Smoking status: Never Smoker     Smokeless tobacco: Never Used   Substance Use Topics     Alcohol use: Yes     Frequency: 2-4 times a month     Drinks per session: 1 or 2     Binge frequency: Never     Comment: occasionally, 4 per month     Drug use: No     PHQ 2/24/2019 5/28/2019 6/22/2020   PHQ-9 Total Score 1 0 3   Q9: Thoughts of better off dead/self-harm past 2 weeks Not at all Not at all Not at all     ROBI-7 SCORE 1/29/2019 2/15/2019 2/24/2019   Total Score 0 0 0       Review of Systems   Constitutional, HEENT, cardiovascular, pulmonary systems are negative, except as otherwise noted.      Objective    BP 98/84 (BP Location: Right arm, Patient Position: Sitting, Cuff Size: Adult Large)   Pulse 57   Temp 97.4  F (36.3  C)   Resp 20   Ht 1.829 m (6')   Wt 115.1 kg (253 lb 12.8 oz)   LMP 01/18/2018   SpO2 99%   BMI 34.42 kg/m    Body mass index is 34.42 kg/m .  Physical Exam   GENERAL: healthy, alert and no distress  RESP: lungs clear to auscultation - no rales, rhonchi or wheezes  CV: regular rate and rhythm, normal S1 S2, no S3 or S4, no murmur, click or rub, distal spider varicosities noted bilaterally.    EKG - Reviewed and interpreted by me appears normal, NSR, normal axis, normal intervals, no acute ST/T changes c/w ischemia, no LVH by voltage criteria, unchanged from previous tracings  Hospital Outpatient Visit on 12/31/2020   Component Date Value Ref Range Status     Interpretation ECG 12/31/2020 Click View Image link to view waveform and result    Final     ECHO; results reviewed, normal.

## 2021-01-26 ASSESSMENT — ANXIETY QUESTIONNAIRES
GAD7 TOTAL SCORE: 0
5. BEING SO RESTLESS THAT IT IS HARD TO SIT STILL: NOT AT ALL
IF YOU CHECKED OFF ANY PROBLEMS ON THIS QUESTIONNAIRE, HOW DIFFICULT HAVE THESE PROBLEMS MADE IT FOR YOU TO DO YOUR WORK, TAKE CARE OF THINGS AT HOME, OR GET ALONG WITH OTHER PEOPLE: NOT DIFFICULT AT ALL
6. BECOMING EASILY ANNOYED OR IRRITABLE: NOT AT ALL
2. NOT BEING ABLE TO STOP OR CONTROL WORRYING: NOT AT ALL
7. FEELING AFRAID AS IF SOMETHING AWFUL MIGHT HAPPEN: NOT AT ALL
1. FEELING NERVOUS, ANXIOUS, OR ON EDGE: NOT AT ALL
3. WORRYING TOO MUCH ABOUT DIFFERENT THINGS: NOT AT ALL

## 2021-01-26 ASSESSMENT — PATIENT HEALTH QUESTIONNAIRE - PHQ9: 5. POOR APPETITE OR OVEREATING: NOT AT ALL

## 2021-01-27 ASSESSMENT — ANXIETY QUESTIONNAIRES: GAD7 TOTAL SCORE: 0

## 2021-02-03 DIAGNOSIS — Z12.31 VISIT FOR SCREENING MAMMOGRAM: ICD-10-CM

## 2021-02-03 PROCEDURE — 77067 SCR MAMMO BI INCL CAD: CPT | Mod: TC | Performed by: RADIOLOGY

## 2021-02-17 ENCOUNTER — DOCUMENTATION ONLY (OUTPATIENT)
Dept: OTHER | Facility: CLINIC | Age: 56
End: 2021-02-17

## 2021-03-10 ENCOUNTER — OFFICE VISIT (OUTPATIENT)
Dept: DERMATOLOGY | Facility: CLINIC | Age: 56
End: 2021-03-10
Payer: COMMERCIAL

## 2021-03-10 VITALS — OXYGEN SATURATION: 98 % | DIASTOLIC BLOOD PRESSURE: 80 MMHG | HEART RATE: 60 BPM | SYSTOLIC BLOOD PRESSURE: 139 MMHG

## 2021-03-10 DIAGNOSIS — L81.4 LENTIGINES: ICD-10-CM

## 2021-03-10 DIAGNOSIS — D22.9 MULTIPLE BENIGN NEVI: ICD-10-CM

## 2021-03-10 DIAGNOSIS — L90.5 SCAR TISSUE: Primary | ICD-10-CM

## 2021-03-10 DIAGNOSIS — D18.01 CHERRY ANGIOMA: ICD-10-CM

## 2021-03-10 DIAGNOSIS — Z85.820 HISTORY OF MELANOMA: ICD-10-CM

## 2021-03-10 DIAGNOSIS — L82.1 SEBORRHEIC KERATOSES: ICD-10-CM

## 2021-03-10 PROCEDURE — 99214 OFFICE O/P EST MOD 30 MIN: CPT | Performed by: PHYSICIAN ASSISTANT
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
## 2021-03-10 NOTE — PATIENT INSTRUCTIONS
Proper skin care from [facility]:    -Eliminate harsh soaps as they strip the natural oils from the skin, often resulting in dry itchy skin ( i.e. Dial, Zest, [place] Spring)  -Use mild soaps such as Cetaphil or Dove Sensitive Skin in the shower. You do not need to use soap on arms, legs, and trunk every time you shower unless visibly soiled.   -Avoid hot or cold showers.  -After showering, lightly dry off and apply moisturizing within 2-3 minutes. This will help trap moisture in the skin.   -Aggressive use of a moisturizer at least 1-2 times a day to the entire body (including -Vanicream, Cetaphil, Aquaphor or Cerave) and moisturize hands after every washing.  -We recommend using moisturizers that come in a tub that needs to be scooped out, not a pump. This has more of an oil base. It will hold moisture in your skin much better than a water base moisturizer. The above recommended are non-pore clogging.      Wear a sunscreen with at least SPF 30 on your face, ears, neck and V of the chest daily. Wear sunscreen on other areas of the body if those areas are exposed to the sun throughout the day. Sunscreens can contain physical and/or chemical blockers. Physical blockers are less likely to clog pores, these include zinc oxide and titanium dioxide. Reapply every two hour and after swimming. Sunscreen examples include Neutrogena, CeraVe, Blue Lizard, Elta MD and many others.    UV radiation  UVA radiation remains constant throughout the day and throughout the year. It is a longer wavelength than UVB and therefore penetrates deeper into the skin leading to immediate and delayed tanning, photoaging, and skin cancer. 70-80% of UVA and UVB radiation occurs between the hours of 10am-2pm.  UVB radiation  UVB radiation causes the most harmful effects and is more significant during the summer months. However, snow and ice can reflect UVB radiation leading to skin damage during the winter months as well. UVB radiation is  responsible for tanning, burning, inflammation, delayed erythema (pinkness), pigmentation (brown spots), and skin cancer.     I recommend self monthly full body exams and yearly full body exams with a dermatology provider. If you develop a new or changing lesion please follow up for examination. Most skin cancers are pink and scaly or pink and pearly. However, we do see blue/brown/black skin cancers.  Consider the ABCDEs of melanoma when giving yourself your monthly full body exam ( don't forget the groin, buttocks, feet, toes, etc). A-asymmetry, B-borders, C-color, D-diameter, E-elevation or evolving. If you see any of these changes please follow up in clinic. If you cannot see your back I recommend purchasing a hand held mirror to use with a larger wall mirror.

## 2021-03-24 ENCOUNTER — IMMUNIZATION (OUTPATIENT)
Dept: NURSING | Facility: CLINIC | Age: 56
End: 2021-03-24
Payer: COMMERCIAL

## 2021-03-24 PROCEDURE — 0001A PR COVID VAC PFIZER DIL RECON 30 MCG/0.3 ML IM: CPT

## 2021-03-24 PROCEDURE — 91300 PR COVID VAC PFIZER DIL RECON 30 MCG/0.3 ML IM: CPT

## 2021-04-14 ENCOUNTER — OFFICE VISIT (OUTPATIENT)
Dept: NURSING | Facility: CLINIC | Age: 56
End: 2021-04-14
Payer: COMMERCIAL

## 2021-04-14 PROCEDURE — 0002A PR COVID VAC PFIZER DIL RECON 30 MCG/0.3 ML IM: CPT

## 2021-04-14 PROCEDURE — 91300 PR COVID VAC PFIZER DIL RECON 30 MCG/0.3 ML IM: CPT

## 2021-06-12 ENCOUNTER — E-VISIT (OUTPATIENT)
Dept: PEDIATRICS | Facility: CLINIC | Age: 56
End: 2021-06-12
Payer: COMMERCIAL

## 2021-06-12 DIAGNOSIS — L65.9 HAIR LOSS: Primary | ICD-10-CM

## 2021-06-12 PROCEDURE — 99422 OL DIG E/M SVC 11-20 MIN: CPT | Performed by: INTERNAL MEDICINE

## 2021-06-12 ASSESSMENT — ANXIETY QUESTIONNAIRES
1. FEELING NERVOUS, ANXIOUS, OR ON EDGE: SEVERAL DAYS
GAD7 TOTAL SCORE: 6
4. TROUBLE RELAXING: MORE THAN HALF THE DAYS
2. NOT BEING ABLE TO STOP OR CONTROL WORRYING: SEVERAL DAYS
6. BECOMING EASILY ANNOYED OR IRRITABLE: SEVERAL DAYS
7. FEELING AFRAID AS IF SOMETHING AWFUL MIGHT HAPPEN: NOT AT ALL
3. WORRYING TOO MUCH ABOUT DIFFERENT THINGS: SEVERAL DAYS
5. BEING SO RESTLESS THAT IT IS HARD TO SIT STILL: NOT AT ALL
GAD7 TOTAL SCORE: 6
7. FEELING AFRAID AS IF SOMETHING AWFUL MIGHT HAPPEN: NOT AT ALL

## 2021-06-13 ASSESSMENT — ANXIETY QUESTIONNAIRES: GAD7 TOTAL SCORE: 6

## 2021-06-15 DIAGNOSIS — L65.9 HAIR LOSS: ICD-10-CM

## 2021-06-15 LAB — HGB BLD-MCNC: 13.6 G/DL (ref 11.7–15.7)

## 2021-06-15 PROCEDURE — 82607 VITAMIN B-12: CPT | Performed by: INTERNAL MEDICINE

## 2021-06-15 PROCEDURE — 84443 ASSAY THYROID STIM HORMONE: CPT | Performed by: INTERNAL MEDICINE

## 2021-06-15 PROCEDURE — 84439 ASSAY OF FREE THYROXINE: CPT | Performed by: INTERNAL MEDICINE

## 2021-06-15 PROCEDURE — 85018 HEMOGLOBIN: CPT | Performed by: INTERNAL MEDICINE

## 2021-06-15 PROCEDURE — 36415 COLL VENOUS BLD VENIPUNCTURE: CPT | Performed by: INTERNAL MEDICINE

## 2021-06-16 LAB
T4 FREE SERPL-MCNC: 0.8 NG/DL (ref 0.76–1.46)
TSH SERPL DL<=0.005 MIU/L-ACNC: 1.95 MU/L (ref 0.4–4)
VIT B12 SERPL-MCNC: 701 PG/ML (ref 193–986)

## 2021-09-15 ENCOUNTER — OFFICE VISIT (OUTPATIENT)
Dept: PEDIATRICS | Facility: CLINIC | Age: 56
End: 2021-09-15
Payer: COMMERCIAL

## 2021-09-15 ENCOUNTER — OFFICE VISIT (OUTPATIENT)
Dept: DERMATOLOGY | Facility: CLINIC | Age: 56
End: 2021-09-15
Payer: COMMERCIAL

## 2021-09-15 VITALS
TEMPERATURE: 97.2 F | OXYGEN SATURATION: 96 % | DIASTOLIC BLOOD PRESSURE: 74 MMHG | HEART RATE: 76 BPM | HEIGHT: 71 IN | BODY MASS INDEX: 35.92 KG/M2 | SYSTOLIC BLOOD PRESSURE: 112 MMHG | RESPIRATION RATE: 16 BRPM | WEIGHT: 256.6 LBS

## 2021-09-15 VITALS — SYSTOLIC BLOOD PRESSURE: 126 MMHG | DIASTOLIC BLOOD PRESSURE: 79 MMHG

## 2021-09-15 DIAGNOSIS — L82.1 SEBORRHEIC KERATOSES: ICD-10-CM

## 2021-09-15 DIAGNOSIS — Z00.00 ROUTINE GENERAL MEDICAL EXAMINATION AT A HEALTH CARE FACILITY: Primary | ICD-10-CM

## 2021-09-15 DIAGNOSIS — Z86.69 HISTORY OF IMPACTED CERUMEN: ICD-10-CM

## 2021-09-15 DIAGNOSIS — E66.09 CLASS 2 OBESITY DUE TO EXCESS CALORIES WITHOUT SERIOUS COMORBIDITY WITH BODY MASS INDEX (BMI) OF 35.0 TO 35.9 IN ADULT: ICD-10-CM

## 2021-09-15 DIAGNOSIS — L81.4 LENTIGINES: ICD-10-CM

## 2021-09-15 DIAGNOSIS — D22.9 MULTIPLE BENIGN NEVI: ICD-10-CM

## 2021-09-15 DIAGNOSIS — Z85.820 HISTORY OF MELANOMA: Primary | ICD-10-CM

## 2021-09-15 DIAGNOSIS — D03.62 MELANOMA IN SITU OF LEFT UPPER ARM (H): ICD-10-CM

## 2021-09-15 DIAGNOSIS — D18.01 CHERRY ANGIOMA: ICD-10-CM

## 2021-09-15 DIAGNOSIS — E66.812 CLASS 2 OBESITY DUE TO EXCESS CALORIES WITHOUT SERIOUS COMORBIDITY WITH BODY MASS INDEX (BMI) OF 35.0 TO 35.9 IN ADULT: ICD-10-CM

## 2021-09-15 DIAGNOSIS — L29.9 LOCALIZED PRURITUS: ICD-10-CM

## 2021-09-15 DIAGNOSIS — L21.9 DERMATITIS, SEBORRHEIC: ICD-10-CM

## 2021-09-15 PROCEDURE — 99214 OFFICE O/P EST MOD 30 MIN: CPT | Performed by: PHYSICIAN ASSISTANT

## 2021-09-15 PROCEDURE — 90682 RIV4 VACC RECOMBINANT DNA IM: CPT | Performed by: INTERNAL MEDICINE

## 2021-09-15 PROCEDURE — 96127 BRIEF EMOTIONAL/BEHAV ASSMT: CPT | Performed by: INTERNAL MEDICINE

## 2021-09-15 PROCEDURE — 99396 PREV VISIT EST AGE 40-64: CPT | Mod: 25 | Performed by: INTERNAL MEDICINE

## 2021-09-15 PROCEDURE — 90471 IMMUNIZATION ADMIN: CPT | Performed by: INTERNAL MEDICINE

## 2021-09-15 SDOH — ECONOMIC STABILITY: TRANSPORTATION INSECURITY
IN THE PAST 12 MONTHS, HAS THE LACK OF TRANSPORTATION KEPT YOU FROM MEDICAL APPOINTMENTS OR FROM GETTING MEDICATIONS?: NO

## 2021-09-15 SDOH — ECONOMIC STABILITY: INCOME INSECURITY: IN THE LAST 12 MONTHS, WAS THERE A TIME WHEN YOU WERE NOT ABLE TO PAY THE MORTGAGE OR RENT ON TIME?: NO

## 2021-09-15 SDOH — ECONOMIC STABILITY: FOOD INSECURITY: WITHIN THE PAST 12 MONTHS, THE FOOD YOU BOUGHT JUST DIDN'T LAST AND YOU DIDN'T HAVE MONEY TO GET MORE.: NEVER TRUE

## 2021-09-15 SDOH — ECONOMIC STABILITY: FOOD INSECURITY: WITHIN THE PAST 12 MONTHS, YOU WORRIED THAT YOUR FOOD WOULD RUN OUT BEFORE YOU GOT MONEY TO BUY MORE.: NEVER TRUE

## 2021-09-15 SDOH — ECONOMIC STABILITY: TRANSPORTATION INSECURITY
IN THE PAST 12 MONTHS, HAS LACK OF TRANSPORTATION KEPT YOU FROM MEETINGS, WORK, OR FROM GETTING THINGS NEEDED FOR DAILY LIVING?: NO

## 2021-09-15 SDOH — HEALTH STABILITY: PHYSICAL HEALTH: ON AVERAGE, HOW MANY MINUTES DO YOU ENGAGE IN EXERCISE AT THIS LEVEL?: 0 MIN

## 2021-09-15 SDOH — HEALTH STABILITY: PHYSICAL HEALTH: ON AVERAGE, HOW MANY DAYS PER WEEK DO YOU ENGAGE IN MODERATE TO STRENUOUS EXERCISE (LIKE A BRISK WALK)?: 0 DAYS

## 2021-09-15 SDOH — ECONOMIC STABILITY: INCOME INSECURITY: HOW HARD IS IT FOR YOU TO PAY FOR THE VERY BASICS LIKE FOOD, HOUSING, MEDICAL CARE, AND HEATING?: NOT HARD AT ALL

## 2021-09-15 ASSESSMENT — ANXIETY QUESTIONNAIRES
7. FEELING AFRAID AS IF SOMETHING AWFUL MIGHT HAPPEN: NOT AT ALL
IF YOU CHECKED OFF ANY PROBLEMS ON THIS QUESTIONNAIRE, HOW DIFFICULT HAVE THESE PROBLEMS MADE IT FOR YOU TO DO YOUR WORK, TAKE CARE OF THINGS AT HOME, OR GET ALONG WITH OTHER PEOPLE: NOT DIFFICULT AT ALL
1. FEELING NERVOUS, ANXIOUS, OR ON EDGE: SEVERAL DAYS
6. BECOMING EASILY ANNOYED OR IRRITABLE: NOT AT ALL
GAD7 TOTAL SCORE: 3
5. BEING SO RESTLESS THAT IT IS HARD TO SIT STILL: NOT AT ALL
3. WORRYING TOO MUCH ABOUT DIFFERENT THINGS: SEVERAL DAYS
2. NOT BEING ABLE TO STOP OR CONTROL WORRYING: NOT AT ALL

## 2021-09-15 ASSESSMENT — ENCOUNTER SYMPTOMS
CHILLS: 0
PALPITATIONS: 0
BREAST MASS: 0
PARESTHESIAS: 0
SHORTNESS OF BREATH: 0
NERVOUS/ANXIOUS: 0
WEAKNESS: 0
COUGH: 0
ABDOMINAL PAIN: 0
NAUSEA: 0
ARTHRALGIAS: 0
FREQUENCY: 0
MYALGIAS: 0
JOINT SWELLING: 0
HEMATURIA: 0
DYSURIA: 0
EYE PAIN: 0
DIARRHEA: 0
HEARTBURN: 0
FEVER: 0
HEADACHES: 0
HEMATOCHEZIA: 0
CONSTIPATION: 0
DIZZINESS: 0
SORE THROAT: 0

## 2021-09-15 ASSESSMENT — PATIENT HEALTH QUESTIONNAIRE - PHQ9
SUM OF ALL RESPONSES TO PHQ QUESTIONS 1-9: 0
5. POOR APPETITE OR OVEREATING: SEVERAL DAYS

## 2021-09-15 ASSESSMENT — MIFFLIN-ST. JEOR: SCORE: 1850.06

## 2021-09-15 ASSESSMENT — SOCIAL DETERMINANTS OF HEALTH (SDOH)
IN A TYPICAL WEEK, HOW MANY TIMES DO YOU TALK ON THE PHONE WITH FAMILY, FRIENDS, OR NEIGHBORS?: ONCE A WEEK
HOW OFTEN DO YOU ATTEND CHURCH OR RELIGIOUS SERVICES?: NEVER
HOW OFTEN DO YOU GET TOGETHER WITH FRIENDS OR RELATIVES?: ONCE A WEEK
DO YOU BELONG TO ANY CLUBS OR ORGANIZATIONS SUCH AS CHURCH GROUPS UNIONS, FRATERNAL OR ATHLETIC GROUPS, OR SCHOOL GROUPS?: NO

## 2021-09-15 ASSESSMENT — LIFESTYLE VARIABLES
HOW MANY STANDARD DRINKS CONTAINING ALCOHOL DO YOU HAVE ON A TYPICAL DAY: 1 OR 2
HOW OFTEN DO YOU HAVE A DRINK CONTAINING ALCOHOL: 2-4 TIMES A MONTH
HOW OFTEN DO YOU HAVE SIX OR MORE DRINKS ON ONE OCCASION: NEVER

## 2021-09-15 NOTE — PROGRESS NOTES
SUBJECTIVE:   CC: Alison Chandler is an 56 year old woman who presents for preventive health visit.       Patient has been advised of split billing requirements and indicates understanding: Yes  Healthy Habits:     Getting at least 3 servings of Calcium per day:  Yes    Bi-annual eye exam:  Yes    Dental care twice a year:  Yes    Sleep apnea or symptoms of sleep apnea:  None    Diet:  Other    Frequency of exercise:  None    Taking medications regularly:  No    Medication side effects:  None, No muscle aches and No significant flushing    PHQ-2 Total Score: 0    Additional concerns today:  No      Anxiety Follow-Up    How are you doing with your anxiety since your last visit? No change    Are you having other symptoms that might be associated with anxiety? Yes, will discuss with provider    Have you had a significant life event? No     Are you feeling depressed? No    Do you have any concerns with your use of alcohol or other drugs? No    Social History     Tobacco Use     Smoking status: Never Smoker     Smokeless tobacco: Never Used   Substance Use Topics     Alcohol use: Yes     Comment: occasionally, 4 per month     Drug use: No     ROBI-7 SCORE 2/24/2019 1/26/2021 6/12/2021   Total Score - - 6 (mild anxiety)   Total Score 0 0 6     PHQ 2/24/2019 5/28/2019 6/22/2020   PHQ-9 Total Score 1 0 3   Q9: Thoughts of better off dead/self-harm past 2 weeks Not at all Not at all Not at all     Last PHQ-9 9/15/2021   1.  Little interest or pleasure in doing things 0   2.  Feeling down, depressed, or hopeless 0   3.  Trouble falling or staying asleep, or sleeping too much 0   4.  Feeling tired or having little energy 0   5.  Poor appetite or overeating 0   6.  Feeling bad about yourself 0   7.  Trouble concentrating 0   8.  Moving slowly or restless 0   Q9: Thoughts of better off dead/self-harm past 2 weeks 0   PHQ-9 Total Score 0   Difficulty at work, home, or with people Not difficult at all     ROBI-7  9/15/2021    1. Feeling nervous, anxious, or on edge 1   2. Not being able to stop or control worrying 0   3. Worrying too much about different things 1   4. Trouble relaxing 1   5. Being so restless that it is hard to sit still 0   6. Becoming easily annoyed or irritable 0   7. Feeling afraid, as if something awful might happen 0   ROBI-7 Total Score 3   If you checked any problems, how difficult have they made it for you to do your work, take care of things at home, or get along with other people? Not difficult at all         Today's PHQ-2 Score:   PHQ-2 ( 1999 [employer]) 9/15/2021   Q1: Little interest or pleasure in doing things 0   Q2: Feeling down, depressed or hopeless 0   PHQ-2 Score 0   Q1: Little interest or pleasure in doing things Not at all   Q2: Feeling down, depressed or hopeless Not at all   PHQ-2 Score 0       Abuse: Current or Past (Physical, Sexual or Emotional) - No  Do you feel safe in your environment? Yes        Social History     Tobacco Use     Smoking status: Never Smoker     Smokeless tobacco: Never Used   Substance Use Topics     Alcohol use: Yes     Comment: occasionally, 4 per month         Alcohol Use 9/15/2021   Prescreen: >3 drinks/day or >7 drinks/week? No   Prescreen: >3 drinks/day or >7 drinks/week? -       Reviewed orders with patient.  Reviewed health maintenance and updated orders accordingly - Yes  Labs reviewed in EPIC    Breast Cancer Screening:  Any new diagnosis of family breast, ovarian, or bowel cancer? No    FHS-7: No flowsheet data found.    Mammogram Screening: Recommended mammography every 1-2 years with patient discussion and risk factor consideration  Pertinent mammograms are reviewed under the imaging tab.    History of abnormal Pap smear: NO - age 30-65 PAP every 5 years with negative HPV co-testing recommended  PAP / HPV Latest Ref Rng & Units 5/28/2019   PAP (Historical) - NIL   HPV16 NEG:Negative Negative   HPV18 NEG:Negative Negative   HRHPV NEG:Negative Negative  "    Reviewed and updated as needed this visit by clinical staff  Tobacco  Allergies               Reviewed and updated as needed this visit by Provider                    Review of Systems   Constitutional: Negative for chills and fever.   HENT: Negative for congestion, ear pain, hearing loss and sore throat.    Eyes: Negative for pain and visual disturbance.   Respiratory: Negative for cough and shortness of breath.    Cardiovascular: Negative for chest pain, palpitations and peripheral edema.   Gastrointestinal: Negative for abdominal pain, constipation, diarrhea, heartburn, hematochezia and nausea.   Breasts:  Negative for tenderness, breast mass and discharge.   Genitourinary: Negative for dysuria, frequency, genital sores, hematuria, pelvic pain, urgency, vaginal bleeding and vaginal discharge.   Musculoskeletal: Negative for arthralgias, joint swelling and myalgias.   Skin: Negative for rash.   Neurological: Negative for dizziness, weakness, headaches and paresthesias.   Psychiatric/Behavioral: Negative for mood changes. The patient is not nervous/anxious.      Feels frustrated with her weight. Eats healthy and is very active, but just can't seem to lose weight.      OBJECTIVE:   /74 (BP Location: Right arm, Patient Position: Sitting, Cuff Size: Adult Large)   Pulse 76   Temp 97.2  F (36.2  C) (Tympanic)   Resp 16   Ht 1.803 m (5' 11\")   Wt 116.4 kg (256 lb 9.6 oz)   LMP 01/18/2018   SpO2 96%   BMI 35.79 kg/m    Physical Exam  GENERAL: healthy, alert and no distress  EYES: Eyes grossly normal to inspection, PERRL and conjunctivae and sclerae normal  NECK: no adenopathy, no asymmetry, masses, or scars and thyroid normal to palpation  RESP: lungs clear to auscultation - no rales, rhonchi or wheezes  CV: regular rate and rhythm, normal S1 S2, no S3 or S4, no murmur, click or rub, no peripheral edema and peripheral pulses strong  ABDOMEN: soft, nontender, no hepatosplenomegaly, no masses and bowel " "sounds normal  MS: no gross musculoskeletal defects noted, no edema  SKIN: no suspicious lesions or rashes  NEURO: Normal strength and tone, mentation intact and speech normal  PSYCH: mentation appears normal, affect normal/bright    Diagnostic Test Results:  Labs reviewed in Epic    ASSESSMENT/PLAN:   (Z00.00) Routine general medical examination at a health care facility  (primary encounter diagnosis)  Comment:   Plan: INFLUENZA QUAD, RECOMBINANT, P-FREE (RIV4)         (FLUBLOK), REVIEW OF HEALTH MAINTENANCE         PROTOCOL ORDERS, Lipid panel reflex to direct         LDL Fasting, **Comprehensive metabolic panel         FUTURE 2mo, **Vitamin D Deficiency FUTURE 2mo,         **Iron and iron binding capacity FUTURE 2mo,         **CBC with platelets FUTURE 2mo, Comprehensive         Weight Management            (E66.09,  Z68.35) Class 2 obesity due to excess calories without serious comorbidity with body mass index (BMI) of 35.0 to 35.9 in adult  Comment: discussed referral to weight management, which she is very interested in.  Plan:     (Z86.69) History of impacted cerumen  Comment:   Plan: may need nurse visit ear wash in future.     (D03.62) Melanoma in situ of left upper arm (H)  Comment:   Plan: follows with dermatology.    Patient has been advised of split billing requirements and indicates understanding: No  COUNSELING:  Reviewed preventive health counseling, as reflected in patient instructions    Estimated body mass index is 35.79 kg/m  as calculated from the following:    Height as of this encounter: 1.803 m (5' 11\").    Weight as of this encounter: 116.4 kg (256 lb 9.6 oz).    Weight management plan: Patient referred to endocrine and/or weight management specialty    She reports that she has never smoked. She has never used smokeless tobacco.      Counseling Resources:  ATP IV Guidelines  Pooled Cohorts Equation Calculator  Breast Cancer Risk Calculator  BRCA-Related Cancer Risk Assessment: FHS-7 " Tool  FRAX Risk Assessment  ICSI Preventive Guidelines  Dietary Guidelines for Americans, 2010  USDA's MyPlate  ASA Prophylaxis  Lung CA Screening    Nia Arshad MD  [facility]

## 2021-09-15 NOTE — PATIENT INSTRUCTIONS

## 2021-09-15 NOTE — PATIENT INSTRUCTIONS
Proper skin care from [facility]:    -Eliminate harsh soaps as they strip the natural oils from the skin, often resulting in dry itchy skin ( i.e. Dial, Zest, Guerline Spring)  -Use mild soaps such as Cetaphil or Dove Sensitive Skin in the shower. You do not need to use soap on arms, legs, and trunk every time you shower unless visibly soiled.   -Avoid hot or cold showers.  -After showering, lightly dry off and apply moisturizing within 2-3 minutes. This will help trap moisture in the skin.   -Aggressive use of a moisturizer at least 1-2 times a day to the entire body (including -Vanicream, Cetaphil, Aquaphor or Cerave) and moisturize hands after every washing.  -We recommend using moisturizers that come in a tub that needs to be scooped out, not a pump. This has more of an oil base. It will hold moisture in your skin much better than a water base moisturizer. The above recommended are non-pore clogging.      Wear a sunscreen with at least SPF 30 on your face, ears, neck and V of the chest daily. Wear sunscreen on other areas of the body if those areas are exposed to the sun throughout the day. Sunscreens can contain physical and/or chemical blockers. Physical blockers are less likely to clog pores, these include zinc oxide and titanium dioxide. Reapply every two hour and after swimming.     Sunscreen examples: https://www.ewg.org/sunscreen/    UV radiation  UVA radiation remains constant throughout the day and throughout the year. It is a longer wavelength than UVB and therefore penetrates deeper into the skin leading to immediate and delayed tanning, photoaging, and skin cancer. 70-80% of UVA and UVB radiation occurs between the hours of 10am-2pm.  UVB radiation  UVB radiation causes the most harmful effects and is more significant during the summer months. However, snow and ice can reflect UVB radiation leading to skin damage during the winter months as well. UVB radiation is responsible for tanning,  burning, inflammation, delayed erythema (pinkness), pigmentation (brown spots), and skin cancer.     I recommend self monthly full body exams and yearly full body exams with a dermatology provider. If you develop a new or changing lesion please follow up for examination. Most skin cancers are pink and scaly or pink and pearly. However, we do see blue/brown/black skin cancers.  Consider the ABCDEs of melanoma when giving yourself your monthly full body exam ( don't forget the groin, buttocks, feet, toes, etc). A-asymmetry, B-borders, C-color, D-diameter, E-elevation or evolving. If you see any of these changes please follow up in clinic. If you cannot see your back I recommend purchasing a hand held mirror to use with a larger wall mirror.

## 2021-09-15 NOTE — LETTER
9/15/2021         RE: lAison Chandler  [address] [facility] [address]        Dear Colleague,    Thank you for referring your patient, Alison Chandler, to the [facility]. Please see a copy of my visit note below.    HPI:  Alison Chandler is a 55 year old female patient here today for here today for FBE. hx of 0.7mm MM. Has an itchy area on scalp. Patient states this has been present for years.  Patient reports the following symptoms: itchy on and off  Patient reports the following previous treatments: none.  Son had psoriasis. Also has an itchy are on low back x 7 days prn. Patient reports the following modifying factors: none.  Associated symptoms: none.  Patient has no other skin complaints today.  Remainder of the HPI, Meds, PMH, Allergies, FH, and SH was reviewed in chart.    Pertinent Hx:   Hx of 0.7mm melanoma on left posterior arm tx by mohs 8/6/19  Past Medical History:   Diagnosis Date     Depressive disorder      Malignant melanoma (H)        Past Surgical History:   Procedure Laterality Date     BIOPSY  5/13/16    hysteroscopy - polyp removed     COLONOSCOPY  2/12/16    Clean - 10 yrs until next one     VASCULAR SURGERY  2006, 2010 & 2013    right & left saphenous vein ablation        Family History   Problem Relation Age of Onset     Diabetes Mother 50        type 2     Hypertension Mother      Obesity Mother      Hyperlipidemia Father      Diabetes Maternal Grandmother      Hypertension Brother      Obesity Brother      Melanoma No family hx of        Social History     Socioeconomic History     Marital status:      Spouse name: Not on file     Number of children: 1     Years of education: Not on file     Highest education level: Bachelor's degree (e.g., BA, AB, BS)   Occupational History     Occupation:      Employer: OTHER     Comment: [employer]   Tobacco Use     Smoking status: Never Smoker      Smokeless tobacco: Never Used   Substance and Sexual Activity     Alcohol use: Yes     Comment: occasionally, 4 per month     Drug use: No     Sexual activity: Yes     Partners: Male     Birth control/protection: Male Surgical   Other Topics Concern     Parent/sibling w/ CABG, MI or angioplasty before 65F 55M? No   Social History Narrative     Not on file     Social Determinants of Health     Financial Resource Strain:      Difficulty of Paying Living Expenses:    Food Insecurity:      Worried About Running Out of Food in the Last Year:      Ran Out of Food in the Last Year:    Transportation Needs:      Lack of Transportation (Medical):      Lack of Transportation (Non-Medical):    Physical Activity:      Days of Exercise per Week:      Minutes of Exercise per Session:    Stress:      Feeling of Stress :    Social Connections:      Frequency of Communication with Friends and Family:      Frequency of Social Gatherings with Friends and Family:      Attends Cheondoism Services:      Active Member of Clubs or Organizations:      Attends Club or Organization Meetings:      Marital Status:    Intimate Partner Violence:      Fear of Current or Ex-Partner:      Emotionally Abused:      Physically Abused:      Sexually Abused:        Outpatient Encounter Medications as of 9/15/2021   Medication Sig Dispense Refill     FLUoxetine (PROZAC) 20 MG capsule Take 3 capsules (60 mg) by mouth daily . 270 capsule 3     FLUoxetine (PROZAC) 40 MG capsule Take 1 capsule (40 mg) by mouth daily 90 capsule 1     Multiple Vitamins-Minerals (MULTIVITAMIN ADULT PO) Take by mouth daily       Omega-3 Fatty Acids (FISH OIL) 1200 MG CAPS Take 1 capsule by mouth daily       UNABLE TO FIND MEDICATION NAME: calcium 1000 mg daily        Vitamin D, Cholecalciferol, 1000 UNITS CAPS Take 1 tablet by mouth 2 times daily       No facility-administered encounter medications on file as of 9/15/2021.       Review Of Systems:  Skin: spot on back, itchy  scalp  Eyes: negative  Ears/Nose/Throat: negative  Respiratory: No shortness of breath, dyspnea on exertion, cough, or hemoptysis  Cardiovascular: negative  Gastrointestinal: negative  Genitourinary: negative  Musculoskeletal: negative  Neurologic: negative  Psychiatric: negative  Hematologic/Lymphatic/Immunologic: negative  Endocrine: negative      Objective:     LMP 01/18/2018   Eyes: Conjunctivae/lids: Normal   ENT: Lips:  Normal  MSK: Normal  Cardiovascular: Peripheral edema none  Pulm: Breathing Normal  Neuro/Psych: Orientation: A/O x 3. Normal; Mood/Affect: Normal, NAD, WDWN  Pt accompanied by: self  Following areas examined: Scalp, face, eyelids, lips, neck, chest, abdomen, back, and R&L upper and lower extremities, buttock, hips, pt defers exam of groin and genitals.   Lepe skin type:ii   Nodes: left axilla, left antecubital NLAD  Findings:  Red smooth well-defined macules and papule on scalp, back and on trunk and extremities.  Brown, stuck-on scaly appearing papules on trunk and extremities.  Well circumscribed macules with symmetric color distribution on trunk and extremities.  Tan WD smooth macules on face, neck, trunk, and extremities.  Smooth linear patch on left posterior arm  Firm smooth linear atrophic patch and firm smooth nodule on crown of scalp  Normal appearing scalp    Assessment and Plan:     1) Cherry angiomas, Seborrheic keratoses, Benign nevi, Lentigines     I discussed the specifics of tumor, prognosis, and genetics of benign lesions.  I explained that treatment of these lesions would be purely cosmetic and not medically neccessary.  I discussed with patient different removal options including excision, cryotherapy, cautery and /or laser.  Lesion may recur and/or may not completely resolve. May need additional treatment.   Cherry angioma/hemangioma on back. Appears benign. Disc removal. Pt defers. Continue to watch and monitor.   Cherry angioma itching on lumbar back. Disc  moisturizing skin to see if it helps with the itch. appears benign clinically.     2) seborrheic dermatitis of scalp, localized pruritis  Disc rx topical  Disc head and shoulders-sample given  3) History of MM  Hx of 0.7mm melanoma on left posterior arm tx by mohs 8/6/19  No evidence of recurrence  Educated and explained how to palpate lymph nodes monthly.   Your melanoma test showed that your melanoma was a low risk Class 1 score in which the 5-year metastatic free survival rate was determined to be 97%.  Signs and Symptoms of non-melanoma skin cancer and ABCDEs of melanoma reviewed with patient. Patient encouraged to perform monthly self skin exams and educated on how to perform them. UV precautions reviewed with patient. Patient was asked about new or changing moles/lesions on body.   Wear a sunscreen with at least SPF 30 on your face, ears, neck and V of the chest daily. Wear sunscreen on other areas of the body if those areas are exposed to the sun throughout the day. Sunscreens can contain physical and/or chemical blockers. Physical blockers are less likely to clog pores, these include zinc oxide and titanium dioxide. Reapply every two hour and after swimming. Sunscreen examples include Neutrogena, CeraVe, Blue Lizard, Elta MD and many others.    Proper skin care from [facility]:    -Eliminate harsh soaps as they strip the natural oils from the skin, often resulting in dry itchy skin ( i.e. Dial, Zest, Guerline Spring)  -Use mild soaps such as Cetaphil or Dove Sensitive Skin in the shower. You do not need to use soap on arms, legs, and trunk every time you shower unless visibly soiled.   -Avoid hot or cold showers.  -After showering, lightly dry off and apply moisturizing within 2-3 minutes. This will help trap moisture in the skin.   -Aggressive use of a moisturizer at least 1-2 times a day to the entire body (including -Vanicream, Cetaphil, Aquaphor or Cerave) and moisturize hands after every  washing.  -We recommend using moisturizers that come in a tub that needs to be scooped out, not a pump. This has more of an oil base. It will hold moisture in your skin much better than a water base moisturizer. The above recommended are non-pore clogging.               Follow up in 12 month FBE        Again, thank you for allowing me to participate in the care of your patient.        Sincerely,        Cate Gregg PA-C

## 2021-09-15 NOTE — PROGRESS NOTES
HPI:  Alison Chandler is a 55 year old female patient here today for here today for FBE. hx of 0.7mm MM. Has an itchy area on scalp. Patient states this has been present for years.  Patient reports the following symptoms: itchy on and off  Patient reports the following previous treatments: none.  Son had psoriasis. Also has an itchy are on low back x 7 days prn. Patient reports the following modifying factors: none.  Associated symptoms: none.  Patient has no other skin complaints today.  Remainder of the HPI, Meds, PMH, Allergies, FH, and SH was reviewed in chart.    Pertinent Hx:   Hx of 0.7mm melanoma on left posterior arm tx by mohs 8/6/19  Past Medical History:   Diagnosis Date     Depressive disorder      Malignant melanoma (H)        Past Surgical History:   Procedure Laterality Date     BIOPSY  5/13/16    hysteroscopy - polyp removed     COLONOSCOPY  2/12/16    Clean - 10 yrs until next one     VASCULAR SURGERY  2006, 2010 & 2013    right & left saphenous vein ablation        Family History   Problem Relation Age of Onset     Diabetes Mother 50        type 2     Hypertension Mother      Obesity Mother      Hyperlipidemia Father      Diabetes Maternal Grandmother      Hypertension Brother      Obesity Brother      Melanoma No family hx of        Social History     Socioeconomic History     Marital status:      Spouse name: Not on file     Number of children: 1     Years of education: Not on file     Highest education level: Bachelor's degree (e.g., BA, AB, BS)   Occupational History     Occupation:      Employer: OTHER     Comment: [employer]   Tobacco Use     Smoking status: Never Smoker     Smokeless tobacco: Never Used   Substance and Sexual Activity     Alcohol use: Yes     Comment: occasionally, 4 per month     Drug use: No     Sexual activity: Yes     Partners: Male     Birth control/protection: Male Surgical   Other Topics Concern     Parent/sibling w/ CABG, MI or  angioplasty before 65F 55M? No   Social History Narrative     Not on file     Social Determinants of Health     Financial Resource Strain:      Difficulty of Paying Living Expenses:    Food Insecurity:      Worried About Running Out of Food in the Last Year:      Ran Out of Food in the Last Year:    Transportation Needs:      Lack of Transportation (Medical):      Lack of Transportation (Non-Medical):    Physical Activity:      Days of Exercise per Week:      Minutes of Exercise per Session:    Stress:      Feeling of Stress :    Social Connections:      Frequency of Communication with Friends and Family:      Frequency of Social Gatherings with Friends and Family:      Attends Gnosticism Services:      Active Member of Clubs or Organizations:      Attends Club or Organization Meetings:      Marital Status:    Intimate Partner Violence:      Fear of Current or Ex-Partner:      Emotionally Abused:      Physically Abused:      Sexually Abused:        Outpatient Encounter Medications as of 9/15/2021   Medication Sig Dispense Refill     FLUoxetine (PROZAC) 20 MG capsule Take 3 capsules (60 mg) by mouth daily . 270 capsule 3     FLUoxetine (PROZAC) 40 MG capsule Take 1 capsule (40 mg) by mouth daily 90 capsule 1     Multiple Vitamins-Minerals (MULTIVITAMIN ADULT PO) Take by mouth daily       Omega-3 Fatty Acids (FISH OIL) 1200 MG CAPS Take 1 capsule by mouth daily       UNABLE TO FIND MEDICATION NAME: calcium 1000 mg daily        Vitamin D, Cholecalciferol, 1000 UNITS CAPS Take 1 tablet by mouth 2 times daily       No facility-administered encounter medications on file as of 9/15/2021.       Review Of Systems:  Skin: spot on back, itchy scalp  Eyes: negative  Ears/Nose/Throat: negative  Respiratory: No shortness of breath, dyspnea on exertion, cough, or hemoptysis  Cardiovascular: negative  Gastrointestinal: negative  Genitourinary: negative  Musculoskeletal: negative  Neurologic: negative  Psychiatric:  negative  Hematologic/Lymphatic/Immunologic: negative  Endocrine: negative      Objective:     LMP 01/18/2018   Eyes: Conjunctivae/lids: Normal   ENT: Lips:  Normal  MSK: Normal  Cardiovascular: Peripheral edema none  Pulm: Breathing Normal  Neuro/Psych: Orientation: A/O x 3. Normal; Mood/Affect: Normal, NAD, WDWN  Pt accompanied by: self  Following areas examined: Scalp, face, eyelids, lips, neck, chest, abdomen, back, and R&L upper and lower extremities, buttock, hips, pt defers exam of groin and genitals.   Lepe skin type:ii   Nodes: left axilla, left antecubital NLAD  Findings:  Red smooth well-defined macules and papule on scalp, back and on trunk and extremities.  Brown, stuck-on scaly appearing papules on trunk and extremities.  Well circumscribed macules with symmetric color distribution on trunk and extremities.  Tan WD smooth macules on face, neck, trunk, and extremities.  Smooth linear patch on left posterior arm  Firm smooth linear atrophic patch and firm smooth nodule on crown of scalp  Normal appearing scalp    Assessment and Plan:     1) Cherry angiomas, Seborrheic keratoses, Benign nevi, Lentigines     I discussed the specifics of tumor, prognosis, and genetics of benign lesions.  I explained that treatment of these lesions would be purely cosmetic and not medically neccessary.  I discussed with patient different removal options including excision, cryotherapy, cautery and /or laser.  Lesion may recur and/or may not completely resolve. May need additional treatment.   Cherry angioma/hemangioma on back. Appears benign. Disc removal. Pt defers. Continue to watch and monitor.   Cherry angioma itching on lumbar back. Disc moisturizing skin to see if it helps with the itch. appears benign clinically.     2) seborrheic dermatitis of scalp, localized pruritis  Disc rx topical  Disc head and shoulders-sample given  3) History of MM  Hx of 0.7mm melanoma on left posterior arm tx by mohs 8/6/19  No  evidence of recurrence  Educated and explained how to palpate lymph nodes monthly.   Your melanoma test showed that your melanoma was a low risk Class 1 score in which the 5-year metastatic free survival rate was determined to be 97%.  Signs and Symptoms of non-melanoma skin cancer and ABCDEs of melanoma reviewed with patient. Patient encouraged to perform monthly self skin exams and educated on how to perform them. UV precautions reviewed with patient. Patient was asked about new or changing moles/lesions on body.   Wear a sunscreen with at least SPF 30 on your face, ears, neck and V of the chest daily. Wear sunscreen on other areas of the body if those areas are exposed to the sun throughout the day. Sunscreens can contain physical and/or chemical blockers. Physical blockers are less likely to clog pores, these include zinc oxide and titanium dioxide. Reapply every two hour and after swimming. Sunscreen examples include Neutrogena, CeraVe, Blue Lizard, Elta MD and many others.    Proper skin care from [facility]:    -Eliminate harsh soaps as they strip the natural oils from the skin, often resulting in dry itchy skin ( i.e. Dial, Zest, Guerline Spring)  -Use mild soaps such as Cetaphil or Dove Sensitive Skin in the shower. You do not need to use soap on arms, legs, and trunk every time you shower unless visibly soiled.   -Avoid hot or cold showers.  -After showering, lightly dry off and apply moisturizing within 2-3 minutes. This will help trap moisture in the skin.   -Aggressive use of a moisturizer at least 1-2 times a day to the entire body (including -Vanicream, Cetaphil, Aquaphor or Cerave) and moisturize hands after every washing.  -We recommend using moisturizers that come in a tub that needs to be scooped out, not a pump. This has more of an oil base. It will hold moisture in your skin much better than a water base moisturizer. The above recommended are non-pore clogging.               Follow up in  12 month FBE

## 2021-09-16 ASSESSMENT — ANXIETY QUESTIONNAIRES: GAD7 TOTAL SCORE: 3

## 2021-09-22 ENCOUNTER — LAB (OUTPATIENT)
Dept: LAB | Facility: CLINIC | Age: 56
End: 2021-09-22
Payer: COMMERCIAL

## 2021-09-22 DIAGNOSIS — Z00.00 ROUTINE GENERAL MEDICAL EXAMINATION AT A HEALTH CARE FACILITY: ICD-10-CM

## 2021-09-22 LAB
ALBUMIN SERPL-MCNC: 4.2 G/DL (ref 3.4–5)
ALP SERPL-CCNC: 62 U/L (ref 40–150)
ALT SERPL W P-5'-P-CCNC: 49 U/L (ref 0–50)
ANION GAP SERPL CALCULATED.3IONS-SCNC: 4 MMOL/L (ref 3–14)
AST SERPL W P-5'-P-CCNC: 19 U/L (ref 0–45)
BILIRUB SERPL-MCNC: 0.7 MG/DL (ref 0.2–1.3)
BUN SERPL-MCNC: 19 MG/DL (ref 7–30)
CALCIUM SERPL-MCNC: 9.2 MG/DL (ref 8.5–10.1)
CHLORIDE BLD-SCNC: 104 MMOL/L (ref 94–109)
CHOLEST SERPL-MCNC: 201 MG/DL
CO2 SERPL-SCNC: 30 MMOL/L (ref 20–32)
CREAT SERPL-MCNC: 1 MG/DL (ref 0.52–1.04)
DEPRECATED CALCIDIOL+CALCIFEROL SERPL-MC: 42 UG/L (ref 20–75)
ERYTHROCYTE [DISTWIDTH] IN BLOOD BY AUTOMATED COUNT: 13 % (ref 10–15)
FASTING STATUS PATIENT QL REPORTED: YES
GFR SERPL CREATININE-BSD FRML MDRD: 63 ML/MIN/1.73M2
GLUCOSE BLD-MCNC: 101 MG/DL (ref 70–99)
HCT VFR BLD AUTO: 45.8 % (ref 35–47)
HDLC SERPL-MCNC: 61 MG/DL
HGB BLD-MCNC: 14.9 G/DL (ref 11.7–15.7)
IRON SATN MFR SERPL: 26 % (ref 15–46)
IRON SERPL-MCNC: 105 UG/DL (ref 35–180)
LDLC SERPL CALC-MCNC: 122 MG/DL
MCH RBC QN AUTO: 30.6 PG (ref 26.5–33)
MCHC RBC AUTO-ENTMCNC: 32.5 G/DL (ref 31.5–36.5)
MCV RBC AUTO: 94 FL (ref 78–100)
NONHDLC SERPL-MCNC: 140 MG/DL
PLATELET # BLD AUTO: 271 10E3/UL (ref 150–450)
POTASSIUM BLD-SCNC: 4.1 MMOL/L (ref 3.4–5.3)
PROT SERPL-MCNC: 7.7 G/DL (ref 6.8–8.8)
RBC # BLD AUTO: 4.87 10E6/UL (ref 3.8–5.2)
SODIUM SERPL-SCNC: 138 MMOL/L (ref 133–144)
TIBC SERPL-MCNC: 399 UG/DL (ref 240–430)
TRIGL SERPL-MCNC: 90 MG/DL
WBC # BLD AUTO: 5.1 10E3/UL (ref 4–11)

## 2021-09-22 PROCEDURE — 83550 IRON BINDING TEST: CPT

## 2021-09-22 PROCEDURE — 82306 VITAMIN D 25 HYDROXY: CPT

## 2021-09-22 PROCEDURE — 80053 COMPREHEN METABOLIC PANEL: CPT

## 2021-09-22 PROCEDURE — 85027 COMPLETE CBC AUTOMATED: CPT

## 2021-09-22 PROCEDURE — 36415 COLL VENOUS BLD VENIPUNCTURE: CPT

## 2021-09-22 PROCEDURE — 80061 LIPID PANEL: CPT

## 2021-10-04 PROBLEM — E66.812 CLASS 2 OBESITY DUE TO EXCESS CALORIES WITHOUT SERIOUS COMORBIDITY WITH BODY MASS INDEX (BMI) OF 35.0 TO 35.9 IN ADULT: Status: ACTIVE | Noted: 2021-10-04

## 2021-10-04 PROBLEM — E66.09 CLASS 2 OBESITY DUE TO EXCESS CALORIES WITHOUT SERIOUS COMORBIDITY WITH BODY MASS INDEX (BMI) OF 35.0 TO 35.9 IN ADULT: Status: ACTIVE | Noted: 2021-10-04

## 2021-10-15 ENCOUNTER — VIRTUAL VISIT (OUTPATIENT)
Dept: ENDOCRINOLOGY | Facility: CLINIC | Age: 56
End: 2021-10-15
Payer: COMMERCIAL

## 2021-10-15 VITALS — BODY MASS INDEX: 35.98 KG/M2 | HEIGHT: 71 IN | WEIGHT: 257 LBS

## 2021-10-15 DIAGNOSIS — R73.03 PREDIABETES: ICD-10-CM

## 2021-10-15 DIAGNOSIS — Z71.3 NUTRITIONAL COUNSELING: Primary | ICD-10-CM

## 2021-10-15 DIAGNOSIS — E66.01 MORBID OBESITY DUE TO EXCESS CALORIES (H): Primary | ICD-10-CM

## 2021-10-15 DIAGNOSIS — E66.01 MORBID OBESITY DUE TO EXCESS CALORIES (H): ICD-10-CM

## 2021-10-15 PROCEDURE — 99205 OFFICE O/P NEW HI 60 MIN: CPT | Mod: GT | Performed by: INTERNAL MEDICINE

## 2021-10-15 PROCEDURE — 97802 MEDICAL NUTRITION INDIV IN: CPT | Mod: GT | Performed by: DIETITIAN, REGISTERED

## 2021-10-15 RX ORDER — FLUOXETINE 20 MG/1
20 TABLET ORAL
COMMUNITY
End: 2021-11-11

## 2021-10-15 RX ORDER — PHENTERMINE AND TOPIRAMATE 7.5; 46 MG/1; MG/1
7.5 CAPSULE, EXTENDED RELEASE ORAL EVERY MORNING
Qty: 30 CAPSULE | Refills: 5 | Status: SHIPPED | OUTPATIENT
Start: 2021-10-15 | End: 2021-11-11

## 2021-10-15 ASSESSMENT — MIFFLIN-ST. JEOR: SCORE: 1851.87

## 2021-10-15 ASSESSMENT — PAIN SCALES - GENERAL: PAINLEVEL: NO PAIN (0)

## 2021-10-15 NOTE — LETTER
"10/15/2021       RE: Alison Chandler  [address] [facility] [address]     Dear Colleague,    Thank you for referring your patient, Alison Chandler, to the [facility] [place] at [facility]. Please see a copy of my visit note below.        New Medical Weight Management Consult    PATIENT:  Alison Chandler  MRN:         [id number]  :         1965  BONG:         10/15/2021    Dear Pavithra,    I had the pleasure of seeing your patient, Alison Chandler. Full intake/assessment was done to determine barriers to weight loss success and develop a treatment plan. Alison Chandler is a 56 year old female interested in treatment of medical problems associated with excess weight. She has a height of 5' 11\", a weight of 257 lbs 0 oz, and the calculated Body mass index is 35.84 kg/m .    ASSESSMENT/PLAN:  Alison is a patient with adult onset morbid obesity with significant element of familial/genetic influence and with current health consequences.  Alison Chandler eats a high carb diet, eats a high fat diet and uses food as mood management.    Her problem is complicated by mental health/psychopharmacological barriers (notes that stress and anxiety are triggers)      PLAN:    Decrease portion sizes  Purge house of food triggers  Dietician visit of education  Volumetrics eating plan  Calorie/low fat diet    Mental health/Medication barriers   Ancillary testing:  N/A.  Food Plan:  Food/sleep/mood journal, Volumetrics and High protein/low carbohydrates.   Activity Plan:   Regular walking  Supplementary:  N/A.   Medication:   The patient will begin medication in pursuit of improved medical status as influenced by body weight. She will start phentermine. Blood pressure and cardiac status are acceptable.  There is a mutual understanding of the goals and risks of this therapy. The patient is in " "agreement. She is educated on dosage regimen and possible side effects.    --saxenda appears covered by insurance and might be a future option    Additionally:  --start Qsymia  --LaurenBloch in 4-6 wks, me in 2-3 mo  --nurse visit about 1-2 wks for BP/P check    She has the following co-morbidities:       10/12/2021   I have the following health issues associated with obesity: Pre-Diabetes   I have the following symptoms associated with obesity: None of the above   --Uncomfortable with the way her body feels, energy level is good, more concerning than health concerns.   --Have done it before and think I can do it again, was not working full time before (more constraints now).  Time and stress are factors. Cannot get good sleep--middle of night awakening (has added sleep routine which helps with getting to sleep). Had sleep consultation--not SHIN  --wt loss success before--about 62# in 2011 (tracking in myfitnesspal and pre-planning to meet calorie goals). Has since tracked macros also and has learned about satiety: avoiding dairy now, and wanting to cut back on bread, with Whole30 also ID-ed foods that lead to sensitivities. Has increased veggie and fruit variety.  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  If you do not know, please answer: \"I Don't Know\". Dr. Nia Arshad       Weight History 10/12/2021   How concerned are you about your weight? " Very Concerned   Would you describe your weight gain as gradual? Yes   I became overweight: After Pregnancy   The following factors have contributed to my weight gain:  Mental Health Issues, Change in Schedule, Started on Medication that Caused Weight Gain, Eating Too Much, Lack of Exercise, Genetic (Runs in the Family), Stress   I have tried the following methods to lose weight: Watching Portions or Calories, Weight Watchers, Atkins-type Diet (Low Carb/High Protein), Other   Please list the other methods.  Trim Healthy Mama; Whole 30   My lowest weight since age 18 was: 186   My highest weight since age 18 was: 258   The most weight I have ever lost was: (lbs) 62   I have the following family history of obesity/being overweight:  My mother is overweight, My father is overweight, One or more of my siblings are overweight   Has anyone in your family had weight loss surgery? No   How has your weight changed over the last year?  Gained   How many pounds? 20       Diet Recall Review with Patient 10/12/2021   Do you typically eat breakfast? Yes   If you do eat breakfast, what types of food do you eat? Eggs, sweet potatoes, guacamole, fresh fruit, oatmeal   Do you typically eat lunch? Yes   If you do eat lunch, what types of food do you typically eat?  Garden salad with grilled chicken   Do you typically eat supper? Yes   If you do eat supper, what types of food do you typically eat? Beef, chicken, cod, broccoli, sweet potatoes, zucchini, mushrooms   Do you typically eat snacks? Yes   If you do snack, what types of food do you typically eat? Beef sticks, chips, fruit   Do you like vegetables?  Yes   Do you drink water? Yes   How many glasses of juice do you drink in a typical day? 0   How many of glasses of milk do you drink in a typical day? 0   If you do drink milk, what type? N/A   How many 8oz glasses of sugar containing drinks such as Geovany-Aid/sweet tea do you drink in a day? 0   How many cans/bottles of sugar  pop/soda/tea/sports drinks do you drink in a day? 0   How many cans/bottles of diet pop/soda/tea or sports drink do you drink in a day? 0   How often do you have a drink of alcohol? 2-4 Times a Month   If you do drink, how many drinks might you have in a day? 1 or 2       Eating Habits 10/12/2021   Generally, my meals include foods like these: bread, pasta, rice, potatoes, corn, crackers, sweet dessert, pop, or juice. Less Than Weekly   Generally, my meals include foods like these: fried meats, brats, burgers, french fries, pizza, cheese, chips, or ice cream. Once a Week   Eat fast food (like [employer], frestyl, Taco Bell). Once a Week   Eat at a buffet or sit-down restaurant. Never   Eat most of my meals in front of the TV or computer. Once a Week   Often skip meals, eat at random times, have no regular eating times. Less Than Weekly   Rarely sit down for a meal but snack or graze throughout.  Never   Eat extra snacks between meals. Almost Everyday   Eat most of my food at the end of the day. Less Than Weekly   Eat in the middle of the night or wake up at night to eat. Never   Eat extra snacks to prevent or correct low blood sugar. Less Than Weekly   Eat to prevent acid reflux or stomach pain. Never   Worry about not having enough food to eat. A Few Times a Week   Have you been to the food shelf at least a few times this year? No   I eat when I am depressed. Never   I eat when I am stressed. Everyday   I eat when I am bored. A Few Times a Week   I eat when I am anxious. Almost Everyday   I eat when I am happy or as a reward. Once a Week   I feel hungry all the time even if I just have eaten. Never   Feeling full is important to me. Less Than Weekly   I finish all the food on my plate even if I am already full. Once a Week   I can't resist eating delicious food or walk past the good food/smell. A Few Times a Week   I eat/snack without noticing that I am eating. Never   I eat when I am preparing the meal. Less  Than Weekly   I eat more than usual when I see others eating. A Few Times a Week   I have trouble not eating sweets, ice cream, cookies, or chips if they are around the house. Less Than Weekly   I think about food all day. A Few Times a Week   What foods, if any, do you crave? Chips/Crackers   Please list any other foods you crave? Meat   transitioned through menopause--    Amount of Food 10/12/2021   I make myself vomit what I have eaten or use laxatives to get rid of food. Never   I eat a large amount of food, like a loaf of bread, a box of cookies, a pint/quart of ice cream, all at once. Never   I eat a large amount of food even when I am not hungry. Never   I eat rapidly. Never   I eat alone because I feel embarrassed and do not want others to see how much I have eaten. Never   I eat until I am uncomfortably full. Monthly   I feel bad, disgusted, or guilty after I overeat. Almost Everyday   I make myself vomit what I have eaten or use laxatives to get rid of food. Never       Activity/Exercise History 10/12/2021   How much of a typical 12 hour day do you spend sitting? Most of the Day   How much of a typical 12 hour day do you spend lying down? Less Than Half the Day   How much of a typical day do you spend walking/standing? Less Than Half the Day   How many hours (not including work) do you spend on the TV/Video Games/Computer/Tablet/Phone? 4-5 Hours   How many times a week are you active for the purpose of exercise? Never   What keeps you from being more active? Lack of Time, Too tired   How many total minutes do you spend doing some activity for the purpose of exercising when you exercise? More Than 30 Minutes       PAST MEDICAL HISTORY:  Past Medical History:   Diagnosis Date     Depressive disorder      Malignant melanoma (H)        Work/Social History Reviewed With Patient 10/12/2021   My employment status is: Full-Time   My job is:    How much of your job is spent on the computer or phone?  100%   How many hours do you spend commuting to work daily?  1   What is your marital status? /In a Relationship   If in a relationship, is your significant other overweight? No   Do you have children? Yes   If you have children, are they overweight? Yes   Who do you live with?  Spouse   Are they supportive of your health goals? Yes   Who does the food shopping?  Spouse, based on list that i provide.       Mental Health History Reviewed With Patient 10/12/2021   Have you ever been physically or sexually abused? No   If yes, do you feel that the abuse is affecting your weight? No   If yes, would you like to talk to a counselor about the abuse? No   How often in the past 2 weeks have you felt little interest or pleasure in doing things? Not at all   Over the past 2 weeks how often have you felt down, depressed, or hopeless? Not at all       Sleep History Reviewed With Patient 10/12/2021   How many hours do you sleep at night? 7   Do you think that you snore loudly or has anybody ever heard you snore loudly (louder than talking or so loud it can be heard behind a shut door)? No   Has anyone seen or heard you stop breathing during your sleep? No   Do you often feel tired, fatigued, or sleepy during the day? No   Do you have a TV/Computer in your bedroom? No       MEDICATIONS:   Current Outpatient Medications   Medication Sig Dispense Refill     FLUoxetine (PROZAC) 20 MG capsule Take 3 capsules (60 mg) by mouth daily . 270 capsule 3     FLUoxetine HCl, PMDD, 20 MG TABS Take 20 mg by mouth       Multiple Vitamins-Minerals (MULTIVITAMIN ADULT PO) Take by mouth daily       Omega-3 Fatty Acids (FISH OIL) 1200 MG CAPS Take 1 capsule by mouth daily       UNABLE TO FIND MEDICATION NAME: calcium 1000 mg daily        Vitamin D, Cholecalciferol, 1000 UNITS CAPS Take 1 tablet by mouth 2 times daily         ALLERGIES:   No Known Allergies        TIME: about 62min spent on evaluation, management, counseling, education, &  motivational interviewing (video) combined with previsit prep and post visit follow up care/charting same day.    Sincerely,    Sandra Robledo MD

## 2021-10-15 NOTE — LETTER
"10/15/2021       RE: Alison Chandler  [address] [facility] [place] [address]     Dear Colleague,    Thank you for referring your patient, Alison Chandler, to the [facility] [place] [facility]. Please see a copy of my visit note below.    Alison Chandler is a 56 year old female who is being evaluated via a billable video visit.      The patient has been notified of following:     \"This video visit will be conducted via a call between you and your physician/provider. We have found that certain health care needs can be provided without the need for an in-person physical exam.  This service lets us provide the care you need with a video conversation.  If a prescription is necessary we can send it directly to your pharmacy.  If lab work is needed we can place an order for that and you can then stop by our lab to have the test done at a later time.    Video visits are billed at different rates depending on your insurance coverage.  Please reach out to your insurance provider with any questions.    If during the course of the call the physician/provider feels a video visit is not appropriate, you will not be charged for this service.\"    Patient has given verbal consent for Video visit? Yes  How would you like to obtain your AVS? MyChart  If you are dropped from the video visit, the video invite should be resent to: Send to e-mail at: [email or web address]  Will anyone else be joining your video visit? No  {If patient encounters technical issues they should call [phone]      Video-Visit Details    Type of service:  Video Visit    Video Start Time: 9:58 AM  Video End Time: 10:30 AM    Originating Location (pt. Location): Home    Distant Location (provider location):  [facility]     Platform used for Video Visit: Cater to u    During this virtual visit the patient " "is located in [place], patient verifies this as the location during the entirety of this visit.     New Weight Management Nutrition Consultation    Alison Chandler is a 56 year old female presents today for new weight management nutrition consultation.  Patient referred by Dr. Robledo on October 14, 2021.    Patient with Co-morbidities of obesity including:  Type II DM no  Renal Failure no  Sleep apnea no  Hypertension no   Dyslipidemia no  Joint pain no  Back pain no  GERD no     Anthropometrics:  Estimated body mass index is 35.79 kg/m  as calculated from the following:    Height as of 9/15/21: 1.803 m (5' 11\").    Weight as of 9/15/21: 116.4 kg (256 lb 9.6 oz).     Current weight: 256 lbs    Medications for Weight Loss:  Qsymia    NUTRITION HISTORY  See MD note for details.    Pt reports that she lost 60 lbs in 2011 but since then has had many life changes (empty evens, back to work) that have caused an acute increase in stress. She states she is trying to focus on mindfulness and stress reduction and is also taking anxiety medication. Pt states her main challenge is stress eating during the work day.     Pt states that tracking with [employer] and planning meals in advance has worked best for her in the past.    Pt has tried Whole 30 and now does not eat corn syrup, bread, pasta, quinoa, or dairy.  Pt reports that she never has refined grains     Eliminated soda from diet completely       Recent food recall:  Breakfast: 3 eggs, sweet potato, avocado, fruit   Lunch: [facility]- garden salad; office- out to lunch with coworker  Dinner:  box. Picks two healthy recipes at the beginning of the week and have leftovers, bags of steamed veggies  Snacks: popcorn, m&ms  Beverages: coffee  Alcohol: none  Dining out: 2-3 times per weekend, [employer]ordyn [employer]aver chipotle    Physical Activity:  Little during the week.   Fitbit  Weekends- working in the garden, hiking, [address] steps  Has tried exercise apps in the " past    Nutrition Prescription  Recommended energy/nutrient modification.    Nutrition Diagnosis  Obesity r/t long history of self-monitoring deficit and excessive energy intake aeb BMI >30.    Nutrition Intervention  Materials/education provided on Volumetric eating to help satiety level on fewer calories; portion control and healthy food choices (Plate Method and Volumetrics handouts), 100 calorie snack choices, meal and snack planning and websites, sample meal plans     Patient demonstrates understanding.    Expected Engagement: good     Nutrition Goals  1) Log in food journal on the weekends to keep track of calories  2) Prepare healthy snacks ahead of time and bring them to work if needed. If snack is needed use lean protein and/or fruit/vegetable. Examples:   - 2 cup popcorn   - 1 cup mixed berries   - 15 almonds, walnuts, cashews   - carrot/celery sticks and 2 tbsp low-fat ranch   - 1 hard boiled egg   - Part-skim mozzarella cheese stick   - Low-fat, low-sugar greek yogurt with 1/2 cup berries   - Med apple or pear   - sliced bell peppers with 1/2 cup salsa   - 1/2 cup roasted chickpeas   - sliced cucumbers with vinegar    3) Continue planning meals in advance  4) Increase activity during the day by walking after work or standing periodically throughout the day.  5) Try meditation or mindfulness exercises before work instead of after    The Plate Method  [email or web address]    Protein Sources   [email or web address]     Carbohydrates  [email or web address]     Mindful Eating  [email or web address]     Summary of Volumetrics Eating Plan  [email or web address]     Follow-Up:  1 month, PRN    Time spent with patient: 32 minutes.  MATTHEW JOHNSON RD, LD

## 2021-10-15 NOTE — PROGRESS NOTES
"Makayla is a 56 year old who is being evaluated via a billable video visit.      How would you like to obtain your AVS? MyChart  If the video visit is dropped, the invitation should be resent by: [phone]  Will anyone else be joining your video visit? No    During this virtual visit the patient is located in [place], patient verifies this as the location during the entirety of this visit.     Video Start Time: 9:02  Video-Visit Details    Type of service:  Video Visit    Video End Time:9:40    Originating Location (pt. Location): Home    Distant Location (provider location):  [facility] [place]     Platform used for Video Visit: [facility] Weight Management Consult    PATIENT:  Alison Chandler  MRN:         [id number]  :         1965  BONG:         10/15/2021    Dear Pavithra,    I had the pleasure of seeing your patient, Alison Chandler. Full intake/assessment was done to determine barriers to weight loss success and develop a treatment plan. Alison Chandler is a 56 year old female interested in treatment of medical problems associated with excess weight. She has a height of 5' 11\", a weight of 257 lbs 0 oz, and the calculated Body mass index is 35.84 kg/m .    ASSESSMENT/PLAN:  Alison is a patient with adult onset morbid obesity with significant element of familial/genetic influence and with current health consequences.  Alison Chandler eats a high carb diet, eats a high fat diet and uses food as mood management.    Her problem is complicated by mental health/psychopharmacological barriers (notes that stress and anxiety are triggers)      PLAN:    Decrease portion sizes  Purge house of food triggers  Dietician visit of education  Volumetrics eating plan  Calorie/low fat diet    Mental health/Medication barriers   Ancillary testing:  N/A.  Food Plan:  Food/sleep/mood journal, Volumetrics and High protein/low carbohydrates.   Activity " Plan:   Regular walking  Supplementary:  N/A.   Medication:   The patient will begin medication in pursuit of improved medical status as influenced by body weight. She will start phentermine. Blood pressure and cardiac status are acceptable.  There is a mutual understanding of the goals and risks of this therapy. The patient is in agreement. She is educated on dosage regimen and possible side effects.    --saxenda appears covered by insurance and might be a future option    Additionally:  --start Qsymia  --LaurenBloch in 4-6 wks, me in 2-3 mo  --nurse visit about 1-2 wks for BP/P check    She has the following co-morbidities:       10/12/2021   I have the following health issues associated with obesity: Pre-Diabetes   I have the following symptoms associated with obesity: None of the above   --Uncomfortable with the way her body feels, energy level is good, more concerning than health concerns.   --Have done it before and think I can do it again, was not working full time before (more constraints now).  Time and stress are factors. Cannot get good sleep--middle of night awakening (has added sleep routine which helps with getting to sleep). Had sleep consultation--not SHIN  --wt loss success before--about 62# in 2011 (tracking in myfitnesspal and pre-planning to meet calorie goals). Has since tracked macros also and has learned about satiety: avoiding dairy now, and wanting to cut back on bread, with Whole30 also ID-ed foods that lead to sensitivities. Has increased veggie and fruit variety.  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  If you do not know, please answer: \"I Don't Know\". Dr. Nia Arshad       Weight History 10/12/2021   How concerned are you about your weight? Very Concerned   Would you describe your weight gain as gradual? Yes   I became overweight: After Pregnancy   The following factors have contributed to my weight gain:  Mental Health Issues, Change in Schedule, Started on Medication that Caused Weight Gain, Eating Too Much, Lack of Exercise, Genetic (Runs in the Family), Stress   I have tried the following methods to lose weight: Watching Portions or Calories, Weight Watchers, Atkins-type Diet (Low Carb/High Protein), Other   Please list the other methods.  Trim Healthy Mama; Whole 30   My lowest weight since age 18 was: 186   My highest weight since age 18 was: 258   The most weight I have ever lost was: (lbs) 62   I have the following family history of obesity/being overweight:  My mother is overweight, My father is overweight, One or more of my siblings are overweight   Has anyone in your family had weight loss surgery? No   How has your weight changed over the last year?  Gained   How many pounds? 20       Diet Recall Review with Patient 10/12/2021   Do you typically eat breakfast? Yes   If you do eat breakfast, what types of food do you eat? Eggs, sweet potatoes, guacamole, fresh fruit, oatmeal   Do you typically eat lunch? Yes   If you do eat lunch, what types of food do you typically eat?  Garden salad with grilled chicken   Do you typically eat supper? Yes   If you do eat supper, what types of food do you typically eat? Beef, chicken, cod, broccoli, sweet potatoes, zucchini, mushrooms   Do you typically eat snacks? Yes   If you do snack, what types of food do you typically eat? Beef sticks, chips, fruit   Do you like vegetables?  " Yes   Do you drink water? Yes   How many glasses of juice do you drink in a typical day? 0   How many of glasses of milk do you drink in a typical day? 0   If you do drink milk, what type? N/A   How many 8oz glasses of sugar containing drinks such as Geovany-Aid/sweet tea do you drink in a day? 0   How many cans/bottles of sugar pop/soda/tea/sports drinks do you drink in a day? 0   How many cans/bottles of diet pop/soda/tea or sports drink do you drink in a day? 0   How often do you have a drink of alcohol? 2-4 Times a Month   If you do drink, how many drinks might you have in a day? 1 or 2       Eating Habits 10/12/2021   Generally, my meals include foods like these: bread, pasta, rice, potatoes, corn, crackers, sweet dessert, pop, or juice. Less Than Weekly   Generally, my meals include foods like these: fried meats, brats, burgers, french fries, pizza, cheese, chips, or ice cream. Once a Week   Eat fast food (like Spinomixs, BurTumbie, Taco Bell). Once a Week   Eat at a buffet or sit-down restaurant. Never   Eat most of my meals in front of the TV or computer. Once a Week   Often skip meals, eat at random times, have no regular eating times. Less Than Weekly   Rarely sit down for a meal but snack or graze throughout.  Never   Eat extra snacks between meals. Almost Everyday   Eat most of my food at the end of the day. Less Than Weekly   Eat in the middle of the night or wake up at night to eat. Never   Eat extra snacks to prevent or correct low blood sugar. Less Than Weekly   Eat to prevent acid reflux or stomach pain. Never   Worry about not having enough food to eat. A Few Times a Week   Have you been to the food shelf at least a few times this year? No   I eat when I am depressed. Never   I eat when I am stressed. Everyday   I eat when I am bored. A Few Times a Week   I eat when I am anxious. Almost Everyday   I eat when I am happy or as a reward. Once a Week   I feel hungry all the time even if I just have  eaten. Never   Feeling full is important to me. Less Than Weekly   I finish all the food on my plate even if I am already full. Once a Week   I can't resist eating delicious food or walk past the good food/smell. A Few Times a Week   I eat/snack without noticing that I am eating. Never   I eat when I am preparing the meal. Less Than Weekly   I eat more than usual when I see others eating. A Few Times a Week   I have trouble not eating sweets, ice cream, cookies, or chips if they are around the house. Less Than Weekly   I think about food all day. A Few Times a Week   What foods, if any, do you crave? Chips/Crackers   Please list any other foods you crave? Meat   transitioned through menopause--    Amount of Food 10/12/2021   I make myself vomit what I have eaten or use laxatives to get rid of food. Never   I eat a large amount of food, like a loaf of bread, a box of cookies, a pint/quart of ice cream, all at once. Never   I eat a large amount of food even when I am not hungry. Never   I eat rapidly. Never   I eat alone because I feel embarrassed and do not want others to see how much I have eaten. Never   I eat until I am uncomfortably full. Monthly   I feel bad, disgusted, or guilty after I overeat. Almost Everyday   I make myself vomit what I have eaten or use laxatives to get rid of food. Never       Activity/Exercise History 10/12/2021   How much of a typical 12 hour day do you spend sitting? Most of the Day   How much of a typical 12 hour day do you spend lying down? Less Than Half the Day   How much of a typical day do you spend walking/standing? Less Than Half the Day   How many hours (not including work) do you spend on the TV/Video Games/Computer/Tablet/Phone? 4-5 Hours   How many times a week are you active for the purpose of exercise? Never   What keeps you from being more active? Lack of Time, Too tired   How many total minutes do you spend doing some activity for the purpose of exercising when you  exercise? More Than 30 Minutes       PAST MEDICAL HISTORY:  Past Medical History:   Diagnosis Date     Depressive disorder      Malignant melanoma (H)        Work/Social History Reviewed With Patient 10/12/2021   My employment status is: Full-Time   My job is:    How much of your job is spent on the computer or phone? 100%   How many hours do you spend commuting to work daily?  1   What is your marital status? /In a Relationship   If in a relationship, is your significant other overweight? No   Do you have children? Yes   If you have children, are they overweight? Yes   Who do you live with?  Spouse   Are they supportive of your health goals? Yes   Who does the food shopping?  Spouse, based on list that i provide.       Mental Health History Reviewed With Patient 10/12/2021   Have you ever been physically or sexually abused? No   If yes, do you feel that the abuse is affecting your weight? No   If yes, would you like to talk to a counselor about the abuse? No   How often in the past 2 weeks have you felt little interest or pleasure in doing things? Not at all   Over the past 2 weeks how often have you felt down, depressed, or hopeless? Not at all       Sleep History Reviewed With Patient 10/12/2021   How many hours do you sleep at night? 7   Do you think that you snore loudly or has anybody ever heard you snore loudly (louder than talking or so loud it can be heard behind a shut door)? No   Has anyone seen or heard you stop breathing during your sleep? No   Do you often feel tired, fatigued, or sleepy during the day? No   Do you have a TV/Computer in your bedroom? No       MEDICATIONS:   Current Outpatient Medications   Medication Sig Dispense Refill     FLUoxetine (PROZAC) 20 MG capsule Take 3 capsules (60 mg) by mouth daily . 270 capsule 3     FLUoxetine HCl, PMDD, 20 MG TABS Take 20 mg by mouth       Multiple Vitamins-Minerals (MULTIVITAMIN ADULT PO) Take by mouth daily       Omega-3 Fatty  Acids (FISH OIL) 1200 MG CAPS Take 1 capsule by mouth daily       UNABLE TO FIND MEDICATION NAME: calcium 1000 mg daily        Vitamin D, Cholecalciferol, 1000 UNITS CAPS Take 1 tablet by mouth 2 times daily         ALLERGIES:   No Known Allergies        TIME: about 62min spent on evaluation, management, counseling, education, & motivational interviewing (video) combined with previsit prep and post visit follow up care/charting same day.    Sincerely,    Sandra Robledo MD

## 2021-10-15 NOTE — NURSING NOTE
"(   Chief Complaint   Patient presents with     New Patient     new MWM    )    ( Weight: 116.6 kg (257 lb) (Patient reported) )  ( Height: 180.3 cm (5' 11\") )  ( BMI (Calculated): 35.84 )  (   )  (   )  (   )  (   )  (   )  (   )    (   )  (   )  (   )  (   )  (   )  (   )  (   )    (   Patient Active Problem List   Diagnosis     Recurrent major depressive disorder, in partial remission (H)     Anxiety     Melanoma in situ of left upper arm (H)     Simple endometrial hyperplasia without atypia     Class 2 obesity due to excess calories without serious comorbidity with body mass index (BMI) of 35.0 to 35.9 in adult    )  (   Current Outpatient Medications   Medication Sig Dispense Refill     FLUoxetine (PROZAC) 20 MG capsule Take 3 capsules (60 mg) by mouth daily . 270 capsule 3     FLUoxetine HCl, PMDD, 20 MG TABS Take 20 mg by mouth       Multiple Vitamins-Minerals (MULTIVITAMIN ADULT PO) Take by mouth daily       Omega-3 Fatty Acids (FISH OIL) 1200 MG CAPS Take 1 capsule by mouth daily       UNABLE TO FIND MEDICATION NAME: calcium 1000 mg daily        Vitamin D, Cholecalciferol, 1000 UNITS CAPS Take 1 tablet by mouth 2 times daily      )  ( Diabetes Eval:    )    ( Pain Eval:  No Pain (0) )    ( Wound Eval:       )    (   History   Smoking Status     Never Smoker   Smokeless Tobacco     Never Used    )    ( Signed By:  Citlaly Ball, EMT; October 15, 2021; 8:33 AM )    "

## 2021-10-15 NOTE — PROGRESS NOTES
"Alison Chandler is a 56 year old female who is being evaluated via a billable video visit.      The patient has been notified of following:     \"This video visit will be conducted via a call between you and your physician/provider. We have found that certain health care needs can be provided without the need for an in-person physical exam.  This service lets us provide the care you need with a video conversation.  If a prescription is necessary we can send it directly to your pharmacy.  If lab work is needed we can place an order for that and you can then stop by our lab to have the test done at a later time.    Video visits are billed at different rates depending on your insurance coverage.  Please reach out to your insurance provider with any questions.    If during the course of the call the physician/provider feels a video visit is not appropriate, you will not be charged for this service.\"    Patient has given verbal consent for Video visit? Yes  How would you like to obtain your AVS? MyChart  If you are dropped from the video visit, the video invite should be resent to: Send to e-mail at: [email or web address]  Will anyone else be joining your video visit? No  {If patient encounters technical issues they should call [phone]      Video-Visit Details    Type of service:  Video Visit    Video Start Time: 9:58 AM  Video End Time: 10:30 AM    Originating Location (pt. Location): Home    Distant Location (provider location):  [facility] [place]     Platform used for Video Visit: [employer]    During this virtual visit the patient is located in [place], patient verifies this as the location during the entirety of this visit.     New Weight Management Nutrition Consultation    Alison Chandler is a 56 year old female presents today for new weight management nutrition consultation.  Patient referred by Dr. Robledo on October 14, 2021.    Patient with Co-morbidities of obesity " "including:  Type II DM no  Renal Failure no  Sleep apnea no  Hypertension no   Dyslipidemia no  Joint pain no  Back pain no  GERD no     Anthropometrics:  Estimated body mass index is 35.79 kg/m  as calculated from the following:    Height as of 9/15/21: 1.803 m (5' 11\").    Weight as of 9/15/21: 116.4 kg (256 lb 9.6 oz).     Current weight: 256 lbs    Medications for Weight Loss:  Qsymia    NUTRITION HISTORY  See MD note for details.    Pt reports that she lost 60 lbs in 2011 but since then has had many life changes (empty evens, back to work) that have caused an acute increase in stress. She states she is trying to focus on mindfulness and stress reduction and is also taking anxiety medication. Pt states her main challenge is stress eating during the work day.     Pt states that tracking with MyBrandictedPal and planning meals in advance has worked best for her in the past.    Pt has tried Whole 30 and now does not eat corn syrup, bread, pasta, quinoa, or dairy.  Pt reports that she never has refined grains     Eliminated soda from diet completely       Recent food recall:  Breakfast: 3 eggs, sweet potato, avocado, fruit   Lunch: [facility]- garden salad; office- out to lunch with coworker  Dinner:  box. Picks two healthy recipes at the beginning of the week and have leftovers, bags of steamed veggies  Snacks: popcorn, m&ms  Beverages: coffee  Alcohol: none  Dining out: 2-3 times per weekend, nafidris, jordyn mikes, mayo    Physical Activity:  Little during the week.   Fitbit  Weekends- working in the garden, hiking, [address]70 steps  Has tried exercise apps in the past    Nutrition Prescription  Recommended energy/nutrient modification.    Nutrition Diagnosis  Obesity r/t long history of self-monitoring deficit and excessive energy intake aeb BMI >30.    Nutrition Intervention  Materials/education provided on Volumetric eating to help satiety level on fewer calories; portion control and healthy food choices (Plate " Method and Volumetrics handouts), 100 calorie snack choices, meal and snack planning and websites, sample meal plans     Patient demonstrates understanding.    Expected Engagement: good     Nutrition Goals  1) Log in food journal on the weekends to keep track of calories  2) Prepare healthy snacks ahead of time and bring them to work if needed. If snack is needed use lean protein and/or fruit/vegetable. Examples:   - 2 cup popcorn   - 1 cup mixed berries   - 15 almonds, walnuts, cashews   - carrot/celery sticks and 2 tbsp low-fat ranch   - 1 hard boiled egg   - Part-skim mozzarella cheese stick   - Low-fat, low-sugar greek yogurt with 1/2 cup berries   - Med apple or pear   - sliced bell peppers with 1/2 cup salsa   - 1/2 cup roasted chickpeas   - sliced cucumbers with vinegar    3) Continue planning meals in advance  4) Increase activity during the day by walking after work or standing periodically throughout the day.  5) Try meditation or mindfulness exercises before work instead of after    The Plate Method  [email or web address]://[email or web address]    Protein Sources   [email or web address]://[email or web address]     Carbohydrates  [email or web address]     Mindful Eating  [email or web address]     Summary of [employer] Eating Plan  [email or web address]     Follow-Up:  1 month, PRN    Time spent with patient: 32 minutes.  MATTHEW JOHNSON RD, LD

## 2021-10-15 NOTE — PATIENT INSTRUCTIONS
Thank you for allowing us the privilege of caring for you. We hope we provided you with the excellent service you deserve.    Please let us know if there is anything else we can do for you so that we can be sure you are completely satisfied with your care experience.    Your visit was with Dr. Robledo today.    Instructions per today's visit:  --nutrition visit today to plan food and activity/exercise goals  --will start Qsymia to help support lifestyle changes  --we can plan follow up appointments with Lauren Bloch (medication management pharmacist) in 4-6 wks, with Dr. Robledo in about 2-3 mo  --other medications that might be considered/discussed in more detail in the future include Contrave and Wegoy but neither appear to be covered by your insurance so cost may be an issue; Saxenda is similar to Wegovy and is covered so that might be a future option    Please call our contact center at [phone] to schedule your next appointments.    Meal Replacement Products:    Here is the link to our new e-store where you can purchase our meal replacement products    CleanScapes/store    The one week starter kit is a great way to sample a variety of products and see what works for you.    If you want more information about the product go to: Enject    Free Shipping for orders over $75    Benefits of meal replacements products:    Portion and calorie control  Improved nutrition  Structured eating  Simplified food choices  Avoid contact with trigger foods    Interested in working with a health ?  Health coaches work with you to improve your overall health and wellbeing.  They look at the whole person, and may involve discussion of different areas of life, including, but not limited to the four pillars of health (sleep, exercise, nutrition, and stress management). Discuss with your care team if you would like to start working a health .    Health  Coaching-3 Pack: Schedule by calling [phone]    $99 for three health coaching visits    Visits may be done in person or via phone    Coaching is a partnership between the  and the client; Coaches do not prescribe or diagnose    Coaching helps inspire the client to reach his/her personal goals    Bluetooth Scale:    We hope to provide you with high quality telephone and virtual healthcare visits while social distancing for COVID-19 is necessary, as well as in the future when virtual visits may be more convenient for you.    Our technology team made it possible for Bluetooth scales to send weight measurements to our electronic medical record. This allows weights from you weighing at home to securely flow into the medical record, which will improve telephone and virtual visits.  Additionally, studies have shown that adults actually lose more weight when their weights are automatically sent to someone else, and also that this process is not stressful for those adults.    Below is a link for purchasing the scale, with a discount code for our patients. You may call your insurance company to see if they will reimburse you for the cost of the scale, as a piece of durable medical equipment. The scales only go up to a weight of 400 pounds. This is an issue and we are working with the developer on increasing this. We found no scales that go over 400lb that have blue-tooth for connecting to Synergy Hub.    Scale to purchase: the Arledia  Body  Scale: [email or web address]=aw.ds    Discount Code: We have a discount code for our patients to bring the cost down to $50, the code is:  withmed     Steps to link the scale to Synergy Hub via an Android Phone (you can always disconnect at any point in the future):  1. The order must be placed first before the patient can access Track My Health within Synergy Hub.  2. Download Google Fit tello from the  Google Play Store  a. Log in or register using your Google account  3. Download the MyChart uziel from Google Play Store  a. Select add organization  b. Search for vivit and select it  c. Log into Spill Inct  d. Select Track My Health  e. Select the green connect my account button  f. When prompted log into your Google account  g. Select okay to confirm the account  4. Download the Withings Health Mate uziel from Google Play Store  a. [place] for Travelatus  b. Go to profile  c. Tap google fit under the Apps section  d. Select the option to activate Google Fit integration  e. Select the same Google account  f. Select okay to confirm the account  g.  Steps to link the scale to Printi via an iPhone (you can always disconnect at any point in the future):  **Note MentorDOTMe is not available for download on an iPad**  1. The order must be placed first before the patient can access [employer] Health within Printi.  2. Locate the Health uziel on your iPhone.  a. Set up your Apple Health account as prompted  b. The Sources page will show Apps that communicate with your Health uziel. Once all steps are completed, you should see Health Clearleap and MyChart listed under the Apps section and your iPhone under the devices section.  i. Select Health Mate  1. Under 'ALLOW  HEALTH MATE  TO WRITE DATA ensure the toggle is on for Weight.  2. This will allow the scale to add your weight to the Apple Health  ii. Select MyChart  1. Under 'ALLOW  MYCHART  TO READ DATA ensure the toggle is on for Weight.  2. This allows MyChart to grab the weight from MentorDOTMe so your provider can see your weights.  3. Download the MyChart uziel from the Uziel Store  a. Select add organization                                                  b. Search for [place] and select it  c. Log into Printi  d. Select Track My Health  e. Select the green connect my account button  f. Follow prompts to link your device to Spill Inct.  4. Download the Withings health mate uziel in  the Uziel Store  a. Miles for Withings  b. Go to profile  c. MAPPING under the Apps section  d. If prompted to allow access with the Health Uziel, toggle weight on for read and write access.      For any questions/concerns contact Val Spear LPN at [phone]    To schedule appointments with our team, please call [phone]    Please call during clinic hours Monday through Friday 8:00a - 4:00p if you have questions or you can contact us via [employer] at anytime.      Lab results will be communicated through My Chart or letter (if My Chart not used). Please call the clinic if you have not received communication after 1 week or if you have any questions.    Clinic Fax: [phone]    Thank you,  Medical Weight Management Team

## 2021-10-16 NOTE — PATIENT INSTRUCTIONS
Hi Makayla!    Follow-up with RD in 1 month    Thank you,    Betty Hopkins, RD, LD  If you would like to schedule or reschedule an appointment with the RD, please call [phone]    Nutrition Goals  1) Log in food journal on the weekends to keep track of calories  2) Prepare healthy snacks ahead of time and bring them to work if needed. If snack is needed use lean protein and/or fruit/vegetable. Examples:   - 2 cup popcorn   - 1 cup mixed berries   - 15 almonds, walnuts, cashews   - carrot/celery sticks and 2 tbsp low-fat ranch   - 1 hard boiled egg   - Part-skim mozzarella cheese stick   - Low-fat, low-sugar greek yogurt with 1/2 cup berries   - Med apple or pear   - sliced bell peppers with 1/2 cup salsa   - 1/2 cup roasted chickpeas   - sliced cucumbers with vinegar    3) Continue planning meals in advance  4) Increase activity during the day by walking after work or standing periodically throughout the day.  5) Try meditation or mindfulness exercises before work instead of after    The Plate Method  [email or web address]://[email or web address]    Protein Sources   [email or web address]://[email or web address]     Carbohydrates  [email or web address]     Mindful Eating  [email or web address]://[email or web address]     Summary of Volumetrics Eating Plan  [email or web address]       Interested in working with a health ? Health coaches work with you to improve your overall health and wellbeing. They look at the whole person, and may involve discussion of different areas of life, including, but not limited to the four pillars of health (sleep, exercise, nutrition, and stress management). Discuss with your care team if you would like to start working a health .    Health Coaching-3 Pack:    $99 for three health coaching visits    Visits may be done in person or via phone    Coaching is a partnership between the  and the client; Coaches do not prescribe or diagnose    Coaching helps inspire the client to reach his/her personal  "[facility] WEIGHT MANAGEMENT PROGRAM  VIRTUAL SUPPORT GROUPS    For Support Group Information:    We offer support groups for patients who are working on weight loss and considering, preparing for or have had weight loss surgery. There is no cost for this opportunity.  You are invited to attend the?Virtual Support Groups?provided by any of the following locations:    [facility] via Microsoft Teams with Bridget Lira RN  2. [place] via [employer] with Evans Toledo, PhD, LP  3. [place] via [employer] with Apoorva Dai RN  4. [facility] via WorkWith.me Teams with Apoorva Vargas, [facility]    The following Support Group information can also be found on our website:  [email or web address]    [facility] Weight Loss Surgery Support Group    [facility] Weight Loss Surgery Support Group  The support group is a patient-lead forum that meets monthly to share experiences, encouragement and education. It is open to those who have had weight loss surgery, are scheduled for surgery, and those who are considering surgery.  WHEN: This group meets on the 3rd Wednesday of each month from 5:00PM - 6:00PM virtually using Microsoft Teams.  FACILITATOR: Led by Bridget Lira, the program's Clinical Coordinator.  TO REGISTER: Please contact the clinic via Entrec or call the nurse line directly at [phone] to inform our staff that you would like an invite sent to you and to let us know the email you would like the invite sent to. Prior to the meeting, a link with directions on how to join the meeting will be sent to you.    2021 Meetings  August 18: \"Let's Talk\" a time for the group to share.  September 15: \"Let's Talk\" a time for the group to share.  October 20: \"Let's Talk\" a time for the group to share.  November 17: Zeenat Chinchilla RD, DEON \"Protein, Metabolism and Meal Planning\"  December 15: Kartik Mckinnon RD, LD will speak, " "\"Recipe Modification\"    [facility] Support Groups    Connections: Bariatric Care Support Group?  This is open to all [facility] (and those external to this program) pre- and post- operative bariatric surgery patients as well as their support system.  WHEN: This group meets the 2nd Tuesday of each month from 6:30 PM - 8:00 PM virtually using Microsoft Teams.  FACILITATOR: Led by Evans Toledo, Ph.D who is a Licensed Psychologist with the [facility] Comprehensive Weight Management Program.  TO REGISTER: Please send an email to Evans Toledo, Ph.D., LP at?[email or web address]?if you would like an invitation to the group and to learn about using Microsoft Teams.    2021 Meetings  August 10: Open Forum  September 14: Guest Speaker: Apoorva Dai RN,CBN, CIC, [facility] Comprehensive Weight Management Program, \"Your Hospital Stay and Post-Operative Compliance\"  October 12: Open Forum  November 9: Guest Speaker: Kathy Hanson RD,LD, [facility] Weight Management Program,\"Holiday Eating\".  December 14: Guest Speaker Yari Levi MD, MPH, [facility], [facility], \"Body Contouring Surgery for Bariatric Surgery Patients\"    Connections: Post-Operative Bariatric Surgery Support Group  This is a support group for [facility] bariatric patients (and those external to [facility]) who have had bariatric surgery and are at least 3 months post-surgery.  WHEN: This support group meets the 4th Wednesday of the month from 11:00 AM - 12:00 PM virtually using Microsoft Teams.  FACILITATOR: Led by Certified Bariatric Nurse, Apoorva Dai RN.  TO REGISTER: Please send an email to Apoorva at [email or web address] if you would like an invitation to the group and to learn about using Grovac.    [facility] Healthy Lifestyle Virtual Support Group    Healthy Lifestyle Virtual " Support Group?  This is 60 minutes of small group guided discussion, support and resources. All are welcome who want a healthy lifestyle.  WHEN: This group meets monthly on a Friday from 12:30 PM - to 1:30 PM virtually using Microsoft Teams.  FACILITATOR: Led by National Board Certified Health , Apoorva Vargas, [facility].  TO REGISTER: Please send an email to Apoorva [email or web address] to receive monthly invites to the group or if you have any questions about having a health . Prior to the meeting, a link with directions on how to join the meeting will be sent to you.    2021 Meetings  August 27: Open Forum  September 24: Sleep and the 7 Types of Rest  October 29: Open Forum  November 19: Gratitude  December 10: Open Forum

## 2021-10-18 ENCOUNTER — TELEPHONE (OUTPATIENT)
Dept: ENDOCRINOLOGY | Facility: CLINIC | Age: 56
End: 2021-10-18

## 2021-10-18 NOTE — TELEPHONE ENCOUNTER
lvm to schedule 4 week follow upwith Betty JOHNSON, left call center phone numbe and [facility]

## 2021-11-05 DIAGNOSIS — F41.9 ANXIETY: ICD-10-CM

## 2021-11-08 NOTE — TELEPHONE ENCOUNTER
Prescription approved per [facility] Refill Protocol.    Love Warner RN on 11/8/2021 at 9:48 AM

## 2021-11-09 ENCOUNTER — MYC MEDICAL ADVICE (OUTPATIENT)
Dept: DERMATOLOGY | Facility: CLINIC | Age: 56
End: 2021-11-09
Payer: COMMERCIAL

## 2021-11-09 ENCOUNTER — TELEPHONE (OUTPATIENT)
Dept: DERMATOLOGY | Facility: CLINIC | Age: 56
End: 2021-11-09
Payer: COMMERCIAL

## 2021-11-09 NOTE — TELEPHONE ENCOUNTER
called patient and scheduled for 4pm this Thursday- advised only the mole that is changing no other issue will be discussed.  Laurel BAILEY RN BSN  [facility] [place]  [phone]

## 2021-11-09 NOTE — TELEPHONE ENCOUNTER
"Please db her sometime in the afternoon this Thursday for a biopsy only.    Thanks!      Also Im not sure what you mean by \"we have a procedure slot on Thursday\"...  "

## 2021-11-09 NOTE — TELEPHONE ENCOUNTER
Please see Neventum message and advise.   you have a 40 min procedure slot Thursday at ?  Thank you,    Laurel BAILEYRN BSN  [facility]  [phone]

## 2021-11-09 NOTE — TELEPHONE ENCOUNTER
[facility] Call Center    Phone Message    May a detailed message be left on voicemail: yes     Reason for Call: Symptoms or Concerns     If patient has red-flag symptoms, warm transfer to triage line    Current symptom or concern: Itching mole that has grown significantly since last visit on 9/15/21    Symptoms have been present for:  2 month(s)    Has patient previously been seen for this? Yes    By : Cate Gregg PA-C    Date: 9/15/21    Are there any new or worsening symptoms? Yes: See above. Pt states the last time she had a spot biopsied she was in MOHS surgery two days later. Pt states she sent a Triumfant message to Cate Gregg on 11/9/21.     Pt states she would like a call back from any provider or nurse in the clinic today if possible.       Action Taken: Other: OX Derm    Travel Screening: Not Applicable

## 2021-11-09 NOTE — TELEPHONE ENCOUNTER
Called and spoke to patient.    Patient has an itchy lesion that has grown in size since it was last evaluated on 9/15/21.    Scheduled same week appointment.    Patient voiced understanding.    LYNDSEY Funes-BSN-PHN  [place] [facility]  [phone]

## 2021-11-10 ENCOUNTER — IMMUNIZATION (OUTPATIENT)
Dept: NURSING | Facility: CLINIC | Age: 56
End: 2021-11-10
Payer: COMMERCIAL

## 2021-11-10 PROCEDURE — 91300 PR COVID VAC PFIZER DIL RECON 30 MCG/0.3 ML IM: CPT

## 2021-11-10 PROCEDURE — 0004A PR COVID VAC PFIZER DIL RECON 30 MCG/0.3 ML IM: CPT

## 2021-11-11 ENCOUNTER — OFFICE VISIT (OUTPATIENT)
Dept: FAMILY MEDICINE | Facility: CLINIC | Age: 56
End: 2021-11-11
Payer: COMMERCIAL

## 2021-11-11 VITALS — SYSTOLIC BLOOD PRESSURE: 118 MMHG | DIASTOLIC BLOOD PRESSURE: 72 MMHG

## 2021-11-11 DIAGNOSIS — L82.1 SK (SEBORRHEIC KERATOSIS): Primary | ICD-10-CM

## 2021-11-11 DIAGNOSIS — L82.0 INFLAMED SEBORRHEIC KERATOSIS: ICD-10-CM

## 2021-11-11 DIAGNOSIS — Z85.820 HISTORY OF MELANOMA: ICD-10-CM

## 2021-11-11 DIAGNOSIS — D18.01 CHERRY ANGIOMA: ICD-10-CM

## 2021-11-11 PROBLEM — C43.62 MALIGNANT MELANOMA OF SKIN OF UPPER LIMB, INCLUDING SHOULDER, LEFT (H): Status: ACTIVE | Noted: 2020-09-15

## 2021-11-11 PROCEDURE — 99213 OFFICE O/P EST LOW 20 MIN: CPT | Mod: 25 | Performed by: PHYSICIAN ASSISTANT

## 2021-11-11 PROCEDURE — 17110 DESTRUCTION B9 LES UP TO 14: CPT | Performed by: PHYSICIAN ASSISTANT

## 2021-11-11 NOTE — LETTER
11/11/2021         RE: Alison Chandler  [address] [facility] [address]        Dear Colleague,    Thank you for referring your patient, Alison Chandler, to the [facility]. Please see a copy of my visit note below.    [facility] Dermatology Note  Encounter Date: Nov 11, 2021  Office Visit     Dermatology Problem List:  1. Hx of 0.7 mm melanoma on left posterior arm, s/p MMS 8/6/19  2) ISK  - s/p cryo    ____________________________________________    Assessment & Plan:    1) Inflamed seborrheic keratosis  Benign etiology and course of lesion.    - LN2: Treated with LN2 for 5s for 1-2 cycles. Warned risks of blistering, pain, pigment change, scarring, and incomplete resolution.  Advised patient to return if lesions do not completely resolve within 2-3 months.  Wound care sheet given.    2) cherry angiomas and seborrheic keratoses    Treatment of these lesions would be purely cosmetic and not medically neccessary.  Lesion may recur and/or may not completely resolve. May need additional treatment.  Different removal options including excision, cryotherapy, cautery and /or laser.          3) History of MM  Hx of 0.7mm melanoma on left posterior arm tx by mohs 8/6/19  No evidence of recurrence    Your melanoma test showed that your melanoma was a low risk Class 1 score in which the 5-year metastatic free survival rate was determined to be 97%.  Signs and Symptoms of non-melanoma skin cancer and ABCDEs of melanoma reviewed with patient. Patient encouraged to perform monthly self skin exams and educated on how to perform them. UV precautions reviewed with patient. Patient was asked about new or changing moles/lesions on body.   Wear a sunscreen with at least SPF 30 on your face, ears, neck and V of the chest daily. Wear sunscreen on other areas of the body if those areas are exposed to the sun throughout the day. Sunscreens can contain  physical and/or chemical blockers. Physical blockers are less likely to clog pores, these include zinc oxide and titanium dioxide. Reapply every two hour and after swimming. Sunscreen examples include Neutrogena, CeraVe, Blue Lizard, Elta MD and many others.       Follow-up: 10 month(s) in-person, or earlier for new or changing lesions      Staff and Scribe:     Scribe Disclosure:  I, Rosie Meli, am serving as a scribe to document services personally performed by Cate Gregg PA-C based on data collection and the provider's statements to me.     Provider Disclosure:  The documentation recorded by the scribe accurately reflects the services I personally performed and the decisions made by me.    Cate Gregg MS, [place]      ____________________________________________    CC: Derm Problem (lesion on rt breast area-- changed, itchy, irregular borders and raised)      HPI:  Alison Chandler is a 56 year old female is here today for spot on her right breast.  Patient reports the following symptoms: itchy, growing, and raised.  Patient reports the following previous treatments: none.  Patient reports the following modifying factors: none.  Associated symptoms: none.  Patient has no other skin complaints today.  Remainder of the HPI, Meds, PMH, Allergies, FH, and SH was reviewed in chart.        Labs Reviewed:  N/A    Review Of Systems:  Skin: itchy spot on her right breast  Eyes: negative  Ears/Nose/Throat: negative  Respiratory: No shortness of breath, dyspnea on exertion, cough, or hemoptysis  Cardiovascular: negative  Gastrointestinal: negative  Genitourinary: negative  Musculoskeletal: negative  Neurologic: negative  Psychiatric: negative  Hematologic/Lymphatic/Immunologic: negative  Endocrine: negative    Objective:     /72   LMP 01/18/2018   Eyes: Conjunctivae/lids: Normal   ENT: Lips:  Normal  MSK: Normal  Cardiovascular: Peripheral edema none  Pulm: Breathing Normal  Neuro/Psych:  Orientation: Normal; Mood/Affect: Normal, NAD, WDWN  Pt accompanied by: herself   Following areas examined: face, neck, right inframammary fold, R breast pt wearing mask.   Lepe skin type:i   Findings:  Inflamed brown, stuck-on scaly appearing papule on the right breast.  Brown, stuck-on scaly appearing papules on right chest  Red smooth well-defined macules on right chest      Past Medical History:   Diagnosis Date     Depressive disorder      Malignant melanoma (H)        Past Surgical History:   Procedure Laterality Date     BIOPSY  5/13/16    hysteroscopy - polyp removed     COLONOSCOPY  2/12/16    Clean - 10 yrs until next one     VASCULAR SURGERY  2006, 2010 & 2013    right & left saphenous vein ablation       Family History   Problem Relation Age of Onset     Diabetes Mother 50        type 2     Hypertension Mother      Obesity Mother      Hyperlipidemia Father      Diabetes Maternal Grandmother      Hypertension Brother      Obesity Brother      Melanoma No family hx of        Social History     Tobacco Use     Smoking status: Never Smoker     Smokeless tobacco: Never Used   Substance Use Topics     Alcohol use: Yes     Comment: occasionally, 4 per month                 Again, thank you for allowing me to participate in the care of your patient.        Sincerely,        Cate Gregg PA-C

## 2021-11-11 NOTE — PROGRESS NOTES
[facility] Dermatology Note  Encounter Date: Nov 11, 2021  Office Visit     Dermatology Problem List:  1. Hx of 0.7 mm melanoma on left posterior arm, s/p MMS 8/6/19  2) ISK  - s/p cryo    ____________________________________________    Assessment & Plan:    1) Inflamed seborrheic keratosis  Benign etiology and course of lesion.    - LN2: Treated with LN2 for 5s for 1-2 cycles. Warned risks of blistering, pain, pigment change, scarring, and incomplete resolution.  Advised patient to return if lesions do not completely resolve within 2-3 months.  Wound care sheet given.    2) cherry angiomas and seborrheic keratoses    Treatment of these lesions would be purely cosmetic and not medically neccessary.  Lesion may recur and/or may not completely resolve. May need additional treatment.  Different removal options including excision, cryotherapy, cautery and /or laser.          3) History of MM  Hx of 0.7mm melanoma on left posterior arm tx by mohs 8/6/19  No evidence of recurrence    Your melanoma test showed that your melanoma was a low risk Class 1 score in which the 5-year metastatic free survival rate was determined to be 97%.  Signs and Symptoms of non-melanoma skin cancer and ABCDEs of melanoma reviewed with patient. Patient encouraged to perform monthly self skin exams and educated on how to perform them. UV precautions reviewed with patient. Patient was asked about new or changing moles/lesions on body.   Wear a sunscreen with at least SPF 30 on your face, ears, neck and V of the chest daily. Wear sunscreen on other areas of the body if those areas are exposed to the sun throughout the day. Sunscreens can contain physical and/or chemical blockers. Physical blockers are less likely to clog pores, these include zinc oxide and titanium dioxide. Reapply every two hour and after swimming. Sunscreen examples include Neutrogena, CeraVe, Blue Lizard, Elta MD and many others.       Follow-up: 10  month(s) in-person, or earlier for new or changing lesions      Staff and Scribe:     Scribe Disclosure:  I, Rosie Meli, am serving as a scribe to document services personally performed by Cate Gregg PA-C based on data collection and the provider's statements to me.     Provider Disclosure:  The documentation recorded by the scribe accurately reflects the services I personally performed and the decisions made by me.    Cate Gregg, MS, [place]      ____________________________________________    CC: Derm Problem (lesion on rt breast area-- changed, itchy, irregular borders and raised)      HPI:  Alison Chandler is a 56 year old female is here today for spot on her right breast.  Patient reports the following symptoms: itchy, growing, and raised.  Patient reports the following previous treatments: none.  Patient reports the following modifying factors: none.  Associated symptoms: none.  Patient has no other skin complaints today.  Remainder of the HPI, Meds, PMH, Allergies, FH, and SH was reviewed in chart.        Labs Reviewed:  N/A    Review Of Systems:  Skin: itchy spot on her right breast  Eyes: negative  Ears/Nose/Throat: negative  Respiratory: No shortness of breath, dyspnea on exertion, cough, or hemoptysis  Cardiovascular: negative  Gastrointestinal: negative  Genitourinary: negative  Musculoskeletal: negative  Neurologic: negative  Psychiatric: negative  Hematologic/Lymphatic/Immunologic: negative  Endocrine: negative    Objective:     /72   LMP 01/18/2018   Eyes: Conjunctivae/lids: Normal   ENT: Lips:  Normal  MSK: Normal  Cardiovascular: Peripheral edema none  Pulm: Breathing Normal  Neuro/Psych: Orientation: Normal; Mood/Affect: Normal, NAD, WDWN  Pt accompanied by: herself   Following areas examined: face, neck, right inframammary fold, R breast pt wearing mask.   Lepe skin type:i   Findings:  Inflamed brown, stuck-on scaly appearing papule on the right breast.  Brown,  stuck-on scaly appearing papules on right chest  Red smooth well-defined macules on right chest      Past Medical History:   Diagnosis Date     Depressive disorder      Malignant melanoma (H)        Past Surgical History:   Procedure Laterality Date     BIOPSY  5/13/16    hysteroscopy - polyp removed     COLONOSCOPY  2/12/16    Clean - 10 yrs until next one     VASCULAR SURGERY  2006, 2010 & 2013    right & left saphenous vein ablation       Family History   Problem Relation Age of Onset     Diabetes Mother 50        type 2     Hypertension Mother      Obesity Mother      Hyperlipidemia Father      Diabetes Maternal Grandmother      Hypertension Brother      Obesity Brother      Melanoma No family hx of        Social History     Tobacco Use     Smoking status: Never Smoker     Smokeless tobacco: Never Used   Substance Use Topics     Alcohol use: Yes     Comment: occasionally, 4 per month

## 2021-11-11 NOTE — PATIENT INSTRUCTIONS
WOUND CARE INSTRUCTIONS  FOR CRYOSURGERY  Right Breast Area  For questions please call [phone]        This area treated with liquid nitrogen will form a blister. You do not need to bandage the area until after the blister forms and breaks (which may be a few days).  When the blister breaks, begin daily dressing changes as follows:    1) Clean and dry the area with tap water using clean Q-tip or sterile gauze pad.    2) Apply Vaseline or Aquaphor over entire wound. Other options include Polysporin ointment or Bacitracin ointment over entire wound.  Do NOT use Neosporin ointment.    3) Cover the wound with a band-aid or sterile non-stick gauze pad and micropore paper tape.      REPEAT THESE INSTRUCTIONS AT LEAST ONCE A DAY UNTIL THE WOUND HAS COMPLETELY HEALED.        It is an old wives tale that a wound heals better when it is exposed to air and allowed to dry out. The wound will heal faster with a better cosmetic result if it is kept moist with ointment and covered with a bandage.  Do not let the wound dry out.      Supplies Needed:     *Cotton tipped applicators (Q-tips)   *Polysporin ointment or Bacitracin ointment (NOT NEOSPORIN)   *Band-aids, or non stick gauze pads and micropore paper tape    PATIENT INFORMATION    During the healing process you will notice a number of changes. All wounds develop a small halo of redness surrounding the wound.  This means healing is occurring. Severe itching with extensive redness usually indicates sensitivity to the ointment or bandage tape used to dress the wound.  You should call our office if this develops.      Swelling and/or discoloration around your surgical site is common, particularly when performed around the eye.    All wounds normally drain.  The larger the wound the more drainage there will be.  After 7-10 days, you will notice the wound beginning to shrink and new skin will begin to grow.  The wound is healed when you can see skin has formed over the entire  area.  A healed wound has a healthy, shiny look to the surface and is red to dark pink in color to normalize.  Wounds may take approximately 4-6 weeks to heal.  Larger wounds may take 6-8 weeks.  After the wound is healed you may discontinue dressing changes.    You may experience a sensation of tightness as your wound heals. This is normal and will gradually subside.    Your healed wound may be sensitive to temperature changes. This sensitivity improves with time, but if you re having a lot of discomfort, try to avoid temperature extremes.    Patients frequently experience itching after their wound appears to have healed because of the continue healing under the skin.  Plain Vaseline will help relieve the itching.             Proper skin care from [facility]:    -Eliminate harsh soaps as they strip the natural oils from the skin, often resulting in dry itchy skin ( i.e. Dial, Zest, Mongolian Spring)  -Use mild soaps such as Cetaphil or Dove Sensitive Skin in the shower. You do not need to use soap on arms, legs, and trunk every time you shower unless visibly soiled.   -Avoid hot or cold showers.  -After showering, lightly dry off and apply moisturizing within 2-3 minutes. This will help trap moisture in the skin.   -Aggressive use of a moisturizer at least 1-2 times a day to the entire body (including -Vanicream, Cetaphil, Aquaphor or Cerave) and moisturize hands after every washing.  -We recommend using moisturizers that come in a tub that needs to be scooped out, not a pump. This has more of an oil base. It will hold moisture in your skin much better than a water base moisturizer. The above recommended are non-pore clogging.      Wear a sunscreen with at least SPF 30 on your face, ears, neck and V of the chest daily. Wear sunscreen on other areas of the body if those areas are exposed to the sun throughout the day. Sunscreens can contain physical and/or chemical blockers. Physical blockers are less likely  to clog pores, these include zinc oxide and titanium dioxide. Reapply every two hour and after swimming.     Sunscreen examples: https://www.ewg.org/sunscreen/    UV radiation  UVA radiation remains constant throughout the day and throughout the year. It is a longer wavelength than UVB and therefore penetrates deeper into the skin leading to immediate and delayed tanning, photoaging, and skin cancer. 70-80% of UVA and UVB radiation occurs between the hours of 10am-2pm.  UVB radiation  UVB radiation causes the most harmful effects and is more significant during the summer months. However, snow and ice can reflect UVB radiation leading to skin damage during the winter months as well. UVB radiation is responsible for tanning, burning, inflammation, delayed erythema (pinkness), pigmentation (brown spots), and skin cancer.     I recommend self monthly full body exams and yearly full body exams with a dermatology provider. If you develop a new or changing lesion please follow up for examination. Most skin cancers are pink and scaly or pink and pearly. However, we do see blue/brown/black skin cancers.  Consider the ABCDEs of melanoma when giving yourself your monthly full body exam ( don't forget the groin, buttocks, feet, toes, etc). A-asymmetry, B-borders, C-color, D-diameter, E-elevation or evolving. If you see any of these changes please follow up in clinic. If you cannot see your back I recommend purchasing a hand held mirror to use with a larger wall mirror.

## 2021-11-17 ENCOUNTER — VIRTUAL VISIT (OUTPATIENT)
Dept: ENDOCRINOLOGY | Facility: CLINIC | Age: 56
End: 2021-11-17
Payer: COMMERCIAL

## 2021-11-17 DIAGNOSIS — Z71.3 NUTRITIONAL COUNSELING: Primary | ICD-10-CM

## 2021-11-17 DIAGNOSIS — E66.9 OBESITY: ICD-10-CM

## 2021-11-17 PROCEDURE — 97803 MED NUTRITION INDIV SUBSEQ: CPT | Mod: GT | Performed by: DIETITIAN, REGISTERED

## 2021-11-17 NOTE — PROGRESS NOTES
"Alison Chandler is a 56 year old female who is being evaluated via a billable video visit.      The patient has been notified of following:     \"This video visit will be conducted via a call between you and your physician/provider. We have found that certain health care needs can be provided without the need for an in-person physical exam.  This service lets us provide the care you need with a video conversation.  If a prescription is necessary we can send it directly to your pharmacy.  If lab work is needed we can place an order for that and you can then stop by our lab to have the test done at a later time.    Video visits are billed at different rates depending on your insurance coverage.  Please reach out to your insurance provider with any questions.    If during the course of the call the physician/provider feels a video visit is not appropriate, you will not be charged for this service.\"    Patient has given verbal consent for Video visit? Yes  How would you like to obtain your AVS? MyChart  If you are dropped from the video visit, the video invite should be resent to: Send to e-mail at: [email or web address]  Will anyone else be joining your video visit? No  {If patient encounters technical issues they should call [phone]      Video-Visit Details    Type of service: Video Visit    Video Start Time: 1:52 PM  Video End Time: 2:22 PM    Originating Location (pt. Location): [place]    Distant Location (provider location):  [facility] [place]     Platform used for Video Visit: Mopio    During this virtual visit the patient is located in [place], patient verifies this as the location during the entirety of this visit.     Return Weight Management Nutrition Consultation    Alison Chandler is a 56 year old female presents today for return weight management nutrition consultation.  Patient referred by Dr. Robledo on October 14, 2021.    Patient with Co-morbidities of " "obesity including:  Type II DM no  Renal Failure no  Sleep apnea no  Hypertension no   Dyslipidemia no  Joint pain no  Back pain no  GERD no     Anthropometrics:  Initial weight (10/15/21): 257 lbs  Estimated body mass index is 35.84 kg/m  as calculated from the following:    Height as of 10/15/21: 1.803 m (5' 11\").    Weight as of 10/15/21: 116.6 kg (257 lb).     Current weight: 244 lbs    Medications for Weight Loss:  Qsymia- not taking    NUTRITION HISTORY  See MD note for details.    Pt reports that she lost 60 lbs in 2011 but since then has had many life changes (empty evens, back to work) that have caused an acute increase in stress. She states she is trying to focus on mindfulness and stress reduction and is also taking anxiety medication. Pt states her main challenge is stress eating during the work day.     Pt states that tracking with My[employer]ings[employer] and planning meals in advance has worked best for her in the past.    Pt has tried Whole 30 and now does not eat corn syrup, bread, pasta, quinoa, or dairy.  Pt reports that she never has refined grains     Eliminated soda from diet completely     11/17/21: Focusing on lean proteins, prepping vegetables for work snacks. More conscious during the weekends, drinking water. Was walking when it was warmer but has been getting at least 10,000 steps during the weekend. Not taking Qysmia.   Feeling less stressed at work and has been able to manage emotional eating.     Recent food recall:  Breakfast: 3 eggs, sweet potato, avocado, fruit   Lunch: [employer]- garden salad; office- out to lunch with coworker  Dinner:  box. Picks two healthy recipes at the beginning of the week and have leftovers, bags of steamed veggies  Snacks: popcorn, m&ms  Beverages: coffee  Alcohol: none  Dining out: 2-3 times per weekend, [employer]yn [employer]    Progress on Previous Goals:  1) Log in food journal on the weekends to keep track of calories met, continues  2) Prepare healthy " snacks ahead of time and bring them to work if needed. If snack is needed use lean protein and/or fruit/vegetable. Met, continues  3) Continue planning meals in advance met, continues   4) Increase activity during the day by walking after work or standing periodically throughout the day. Improving, continues   5) Try meditation or mindfulness exercises before work instead of after improving, continues    Physical Activity:  Little during the week.   Fitbit  Weekends- working in the garden, hiking, 20019 steps  Has tried exercise apps in the past    Nutrition Prescription  Recommended energy/nutrient modification.    Nutrition Diagnosis  Obesity r/t long history of self-monitoring deficit and excessive energy intake aeb BMI >30.    Nutrition Intervention  Materials/education provided on Volumetric eating to help satiety level on fewer calories; portion control and healthy food choices (Plate Method and Volumetrics handouts), 100 calorie snack choices, meal and snack planning and websites, sample meal plans     Patient demonstrates understanding.    Expected Engagement: good     Nutrition Goals  1) Continue using food journal   2) Continue meditation before work  3) Continue planning meals and snacks  4) Pre-plan your weekly meals before weeks you know will be overwhelming  5) Try freezing spinach to keep it fresh for longer: https://[employer]/how-to-freeze-spinach/  6) Increase activity as able  7) Try low/non calorie creamers for coffee:    -Oatmilk Caramel Macchiato creamer (1 g sugar) https://GreenBytes/products/elmhurst-1925-caramel-macchiato-oat-creamer?hmfxxxd=[email or web address]&tdnpgsn=[email or web address]&utm_source=rachael&utm_medium=cpc&utm_campaign=Standard_Shopping-Campaign&utm_term=8014077186746248&utm_content=Single%20Product%20-%20Top%20Performers   -Fairlife Creamers (3 g sugar)      The Plate Method  http://[email or web address]    Protein Sources    http://Vurv [email or web address]     Carbohydrates  [email or web address]     Mindful Eating  http://[email or web address]     Summary of Volumetrics Eating Plan  [email or web address]     Follow-Up:  1 month, PRN    Time spent with patient: 30 minutes.  MATTHEW JOHNSON RD, LD

## 2021-11-17 NOTE — LETTER
"11/17/2021       RE: Alison Chandler  [address] [facility] [address]     Dear Colleague,    Thank you for referring your patient, Alison Chandler, to the [facility] [place] [facility]. Please see a copy of my visit note below.    Alison Chandler is a 56 year old female who is being evaluated via a billable video visit.      The patient has been notified of following:     \"This video visit will be conducted via a call between you and your physician/provider. We have found that certain health care needs can be provided without the need for an in-person physical exam.  This service lets us provide the care you need with a video conversation.  If a prescription is necessary we can send it directly to your pharmacy.  If lab work is needed we can place an order for that and you can then stop by our lab to have the test done at a later time.    Video visits are billed at different rates depending on your insurance coverage.  Please reach out to your insurance provider with any questions.    If during the course of the call the physician/provider feels a video visit is not appropriate, you will not be charged for this service.\"    Patient has given verbal consent for Video visit? Yes  How would you like to obtain your AVS? MyChart  If you are dropped from the video visit, the video invite should be resent to: Send to e-mail at: [email or web address]  Will anyone else be joining your video visit? No  {If patient encounters technical issues they should call [phone]    Distant Location (provider location):  [facility] [place]     Platform used for Video Visit: [employer]    During this virtual visit the patient is located in [place], patient verifies this as the location during the entirety of this visit.     Return Weight Management Nutrition Consultation    Alison " "Shelby Chandler is a 56 year old female presents today for return weight management nutrition consultation.  Patient referred by Dr. Robledo on October 14, 2021.    Patient with Co-morbidities of obesity including:  Type II DM no  Renal Failure no  Sleep apnea no  Hypertension no   Dyslipidemia no  Joint pain no  Back pain no  GERD no     Anthropometrics:  Initial weight (10/15/21): 257 lbs  Estimated body mass index is 35.84 kg/m  as calculated from the following:    Height as of 10/15/21: 1.803 m (5' 11\").    Weight as of 10/15/21: 116.6 kg (257 lb).     Current weight: 244 lbs    Medications for Weight Loss:  Qsymia- not taking    NUTRITION HISTORY  See MD note for details.    Pt reports that she lost 60 lbs in 2011 but since then has had many life changes (empty evens, back to work) that have caused an acute increase in stress. She states she is trying to focus on mindfulness and stress reduction and is also taking anxiety medication. Pt states her main challenge is stress eating during the work day.     Pt states that tracking with MiniVaxPal and planning meals in advance has worked best for her in the past.    Pt has tried Whole 30 and now does not eat corn syrup, bread, pasta, quinoa, or dairy.  Pt reports that she never has refined grains     Eliminated soda from diet completely     11/17/21: Focusing on lean proteins, prepping vegetables for work snacks. More conscious during the weekends, drinking water. Was walking when it was warmer but has been getting at least 10,000 steps during the weekend. Not taking Qysmia.   Feeling less stressed at work and has been able to manage emotional eating.     Recent food recall:  Breakfast: 3 eggs, sweet potato, avocado, fruit   Lunch: [facility]- garden salad; office- out to lunch with coworker  Dinner:  box. Picks two healthy recipes at the beginning of the week and have leftovers, bags of steamed veggies  Snacks: popcorn, m&ms  Beverages: coffee  Alcohol: " none  Dining out: 2-3 times per weekend, [employer]yn [employer] chip[employer]    Progress on Previous Goals:  1) Log in food journal on the weekends to keep track of calories met, continues  2) Prepare healthy snacks ahead of time and bring them to work if needed. If snack is needed use lean protein and/or fruit/vegetable. Met, continues  3) Continue planning meals in advance met, continues   4) Increase activity during the day by walking after work or standing periodically throughout the day. Improving, continues   5) Try meditation or mindfulness exercises before work instead of after improving, continues    Physical Activity:  Little during the week.   Fitbit  Weekends- working in the garden, hiking, 44451 steps  Has tried exercise apps in the past    Nutrition Prescription  Recommended energy/nutrient modification.    Nutrition Diagnosis  Obesity r/t long history of self-monitoring deficit and excessive energy intake aeb BMI >30.    Nutrition Intervention  Materials/education provided on Volumetric eating to help satiety level on fewer calories; portion control and healthy food choices (Plate Method and Volumetrics handouts), 100 calorie snack choices, meal and snack planning and websites, sample meal plans     Patient demonstrates understanding.    Expected Engagement: good     Nutrition Goals  1) Continue using food journal   2) Continue meditation before work  3) Continue planning meals and snacks  4) Pre-plan your weekly meals before weeks you know will be overwhelming  5) Try freezing spinach to keep it fresh for longer: https://innRoad/how-to-freeze-spinach/  6) Increase activity as able  7) Try low/non calorie creamers for coffee:    -Oatmilk Caramel Macchiato creamer (1 g sugar)  https://ShopLocket/products/elmhurst-1925-caramel-macchiato-oat-creamer?prmevos=[id number]&oziowhg=[id number]&utm_source=rachael&utm_medium=cpc&utm_campaign=Standard_Shopping-Campaign&utm_term=[id number]&utm_content=Single%20Product%20-%20Top%20Performers   -Fairlife Creamers (3 g sugar)      The Plate Method  http://www.SimplyBox/356004rj.pdf    Protein Sources   http://SimplyBox/853391.pdf     Carbohydrates  http://[email or web address]/[email or web address]     Mindful Eating  http://SimplyBox/866268.pdf     Summary of Volumetrics Eating Plan  [email or web address]://[email or web address]     Follow-Up:  1 month, PRN    Time spent with patient: 30 minutes.  MATTHEW JOHNSON RD, LD          Again, thank you for allowing me to participate in the care of your patient.      Sincerely,    MATTHEW JOHNSON RD

## 2021-11-17 NOTE — PATIENT INSTRUCTIONS
Hi Makayla!    Follow-up with RD in 1 month    Thank you,    Betty Hopkins, JANNETH, LD  If you would like to schedule or reschedule an appointment with the RD, please call [phone]    Nutrition Goals  1) Continue using food journal   2) Continue meditation before work  3) Continue planning meals and snacks  4) Pre-plan your weekly meals before weeks you know will be overwhelming  5) Try freezing spinach to keep it fresh for longer: https://Content Savvy/how-to-freeze-spinach/  6) Increase activity as able  7) Try low/non calorie creamers for coffee:    -Oatmilk Caramel Macchiato creamer (1 g sugar) https://Semtronics Microsystems/products/elmhurst-1925-caramel-macchiato-oat-creamer?pwwtzfp=[id number]&kdntztw=[id number]&utm_source=rachael&utm_medium=cpc&utm_campaign=Standard_Shopping-Campaign&utm_term=[id number]&utm_content=Single%20Product%20-%20Top%20Performers   -Fairlife Creamers (3 g sugar)      The Plate Method  http://www.Netsocket/483273xr.pdf    Protein Sources   http://Netsocket/543016.pdf     Carbohydrates  http://[email or web address]/[email or web address]     Mindful Eating  http://Netsocket/772340.pdf     Summary of Volumetrics Eating Plan  http://[email or web address]       Interested in working with a health ? Health coaches work with you to improve your overall health and wellbeing. They look at the whole person, and may involve discussion of different areas of life, including, but not limited to the four pillars of health (sleep, exercise, nutrition, and stress management). Discuss with your care team if you would like to start working a health .    Health Coaching-3 Pack:    $99 for three health coaching visits    Visits may be done in person or via phone    Coaching is a partnership between the  and the client; Coaches do not prescribe or diagnose    Coaching helps inspire the client to reach his/her personal goals    COMPREHENSIVE WEIGHT MANAGEMENT PROGRAM  VIRTUAL SUPPORT  "GROUPS    For Support Group Information:    We offer support groups for patients who are working on weight loss and considering, preparing for or have had weight loss surgery. There is no cost for this opportunity.  You are invited to attend the?Virtual Support Groups?provided by any of the following locations:    [facility] via Microsoft Teams with Bridget Lira RN  2. [place] via [employer] Teams with Evans Toledo, PhD, LP  3. [place] via [employer] with Apoorva Dai RN  4. [facility] via [employer] Teams with Apoorva Vargas [facility]    The following Support Group information can also be found on our website:  [email or web address]    [facility] Weight Loss Surgery Support Group    [facility] Weight Loss Surgery Support Group  The support group is a patient-lead forum that meets monthly to share experiences, encouragement and education. It is open to those who have had weight loss surgery, are scheduled for surgery, and those who are considering surgery.  WHEN: This group meets on the 3rd Wednesday of each month from 5:00PM - 6:00PM virtually using Microsoft Teams.  FACILITATOR: Led by Bridget Lira, the program's Clinical Coordinator.  TO REGISTER: Please contact the clinic via [employer] or call the nurse line directly at [phone] to inform our staff that you would like an invite sent to you and to let us know the email you would like the invite sent to. Prior to the meeting, a link with directions on how to join the meeting will be sent to you.    2021 Meetings  August 18: \"Let's Talk\" a time for the group to share.  September 15: \"Let's Talk\" a time for the group to share.  October 20: \"Let's Talk\" a time for the group to share.  November 17: Zeenat Chinchilla RD, DEON \"Protein, Metabolism and Meal Planning\"  December 15: Kartik Mckinnon RD, LD will speak, \"Recipe Modification\"    [facility] " "[facility] Support Groups    Connections: Bariatric Care Support Group?  This is open to all [facility] (and those external to this program) pre- and post- operative bariatric surgery patients as well as their support system.  WHEN: This group meets the 2nd Tuesday of each month from 6:30 PM - 8:00 PM virtually using Microsoft Teams.  FACILITATOR: Led by Evans Toledo, Ph.D who is a Licensed Psychologist with the [facility] Comprehensive Weight Management Program.  TO REGISTER: Please send an email to Evans Toledo, Ph.D.,  at?[email or web address]?if you would like an invitation to the group and to learn about using Microsoft Teams.    2021 Meetings  August 10: Open Forum  September 14: Guest Speaker: Apoorva Dai RN,CBN, CIC, [facility], \"Your Hospital Stay and Post-Operative Compliance\"  October 12: Open Forum  November 9: Guest Speaker: Kathy Hanson RD,LD, [facility],\"Holiday Eating\".  December 14: Guest Speaker Yari Levi MD, MPH, [facility], [facility], \"Body Contouring Surgery for Bariatric Surgery Patients\"    Connections: Post-Operative Bariatric Surgery Support Group  This is a support group for [facility] bariatric patients (and those external to [facility]) who have had bariatric surgery and are at least 3 months post-surgery.  WHEN: This support group meets the 4th Wednesday of the month from 11:00 AM - 12:00 PM virtually using Microsoft Teams.  FACILITATOR: Led by Certified Bariatric Nurse, Apoorva Dai RN.  TO REGISTER: Please send an email to Apoorva at [email or web address] if you would like an invitation to the group and to learn about using [employer].    [facility] Healthy Lifestyle Virtual Support Group    Healthy Lifestyle Virtual Support Group?  This is 60 minutes of small group guided " discussion, support and resources. All are welcome who want a healthy lifestyle.  WHEN: This group meets monthly on a Friday from 12:30 PM - to 1:30 PM virtually using Microsoft Teams.  FACILITATOR: Led by National Board Certified Health , Apoorva Vargas, [facility].  TO REGISTER: Please send an email to Apoorva [email or web address] to receive monthly invites to the group or if you have any questions about having a health . Prior to the meeting, a link with directions on how to join the meeting will be sent to you.    2021 Meetings  August 27: Open Forum  September 24: Sleep and the 7 Types of Rest  October 29: Open Forum  November 19: Gratitude  December 10: Open Forum

## 2021-12-14 NOTE — PROGRESS NOTES
"Alison Chandler is a 56 year old female who is being evaluated via a billable video visit.      The patient has been notified of following:     \"This video visit will be conducted via a call between you and your physician/provider. We have found that certain health care needs can be provided without the need for an in-person physical exam.  This service lets us provide the care you need with a video conversation.  If a prescription is necessary we can send it directly to your pharmacy.  If lab work is needed we can place an order for that and you can then stop by our lab to have the test done at a later time.    Video visits are billed at different rates depending on your insurance coverage.  Please reach out to your insurance provider with any questions.    If during the course of the call the physician/provider feels a video visit is not appropriate, you will not be charged for this service.\"    Patient has given verbal consent for Video visit? Yes  How would you like to obtain your AVS? MyChart  If you are dropped from the video visit, the video invite should be resent to: Send to e-mail at: [email or web address]  Will anyone else be joining your video visit? No  {If patient encounters technical issues they should call [phone]      Video-Visit Details    Type of service: Video Visit    Video Start Time: 1:53 PM  Video End Time: 2:12 PM    Originating Location (pt. Location): [place]    Distant Location (provider location):  [facility]NAGEMENT [facility]IN[facility] [place]     Platform used for Video Visit: Shout    During this virtual visit the patient is located in [place], patient verifies this as the location during the entirety of this visit.     Return Weight Management Nutrition Consultation    Alison Chandler is a 56 year old female presents today for return weight management nutrition consultation.  Patient referred by Dr. Robledo on October 14, 2021.    Patient with Co-morbidities of " "obesity including:  Type II DM no  Renal Failure no  Sleep apnea no  Hypertension no   Dyslipidemia no  Joint pain no  Back pain no  GERD no     Anthropometrics:  Initial weight (10/15/21): 257 lbs  Estimated body mass index is 35.84 kg/m  as calculated from the following:    Height as of 10/15/21: 1.803 m (5' 11\").    Weight as of 10/15/21: 116.6 kg (257 lb).     Current weight (12/15/2021): 244 lbs    Medications for Weight Loss:  Qsymia- not taking    NUTRITION HISTORY  See MD note for details.    Pt reports that she lost 60 lbs in 2011 but since then has had many life changes (empty evens, back to work) that have caused an acute increase in stress. She states she is trying to focus on mindfulness and stress reduction and is also taking anxiety medication. Pt states her main challenge is stress eating during the work day.     Pt states that tracking with MyYoutuoPal and planning meals in advance has worked best for her in the past.    Pt has tried Whole 30 and now does not eat corn syrup, bread, pasta, quinoa, or dairy.  Pt reports that she never has refined grains     Eliminated soda from diet completely     11/17/21: Focusing on lean proteins, prepping vegetables for work snacks. More conscious during the weekends, drinking water. Was walking when it was warmer but has been getting at least 10,000 steps during the weekend. Not taking Qysmia.   Feeling less stressed at work and has been able to manage emotional eating.     12/15/2021: Pt states she did well at [facility] but binged on cookies. Frustrated with plateau but hasn't been tracking the past month. Stressed d/t issues with her home and has been focusing more on stress management than diet. Still making healthy choices but not top priority. Focusing on vegetables and not snacking as much in the afternoon. Still struggles with emotional eating.    Recent food recall:  Breakfast: 3 eggs, sweet potato, avocado, fruit   Lunch: wfh- garden salad; office- " out to lunch with coworker  Dinner:  box. Picks two healthy recipes at the beginning of the week and have leftovers, bags of steamed veggies  Snacks: popcorn, m&ms  Beverages: coffee  Alcohol: none  Dining out: 2-3 times per weekend, [employer] chip[employer]    Progress on Previous Goals:  1) Continue using food journal not met  2) Continue meditation before work met, continues  3) Continue planning meals and snacks not met  4) Pre-plan your weekly meals before weeks you know will be overwhelming not met  5) Try freezing spinach to keep it fresh for longer: https://Neomobile/how-to-freeze-spinach/ met, continues  6) Increase activity as able improving, continues  7) Try low/non calorie creamers for coffee: met, continues   -Oatmilk Caramel Macchiato creamer (1 g sugar) https://CargoSense/products/elmhurst-1925-caramel-macchiato-oat-creamer?xpvmucx=[id number]&wmqisjg=[id number]&utm_source=rachael&utm_medium=cpc&utm_campaign=Standard_Shopping-Campaign&utm_term=[id number]&utm_content=Single%20Product%20-%20Top%20Performers   -Fairlife Creamers (3 g sugar)    Physical Activity:  Little during the week.   Fitbit  Weekends- working in the garden, hiking, 23862 steps  Has tried exercise apps in the past    Nutrition Prescription  Recommended energy/nutrient modification.    Nutrition Diagnosis  Obesity r/t long history of self-monitoring deficit and excessive energy intake aeb BMI >30.    Nutrition Intervention  Materials/education provided on Volumetric eating to help satiety level on fewer calories; portion control and healthy food choices (Plate Method and Volumetrics handouts), 100 calorie snack choices, meal and snack planning and websites, sample meal plans     Patient demonstrates understanding.    Expected Engagement: good     Nutrition Goals  1) Continue utilizing food journal  2) Pre Plan meals for the week  3) Have healthy snacks available for work and  home  4) Increase physical activity   -schedule a time for exercise throughout the week     The Plate Method  [email or web address]://www.[email or web address]    Protein Sources   [email or web address]://1jiajie/[email or web address]     Carbohydrates  [email or web address]://[email or web address]     Mindful Eating  http://1jiajie/[email or web address]     Summary of Volumetrics Eating Plan  [email or web address]     Follow-Up:  1 month, PRN    Time spent with patient: 19 minutes.  MATTHEW JOHNSON RD, LD

## 2021-12-15 ENCOUNTER — VIRTUAL VISIT (OUTPATIENT)
Dept: ENDOCRINOLOGY | Facility: CLINIC | Age: 56
End: 2021-12-15
Payer: COMMERCIAL

## 2021-12-15 DIAGNOSIS — Z71.3 NUTRITIONAL COUNSELING: Primary | ICD-10-CM

## 2021-12-15 DIAGNOSIS — E66.9 OBESITY: ICD-10-CM

## 2021-12-15 PROCEDURE — 97803 MED NUTRITION INDIV SUBSEQ: CPT | Mod: GT | Performed by: DIETITIAN, REGISTERED

## 2021-12-15 NOTE — LETTER
"12/15/2021       RE: Alison Chandler  [address] [facility] [address]     Dear Colleague,    Thank you for referring your patient, Alison Chandler, to the [facility]NAG[facility] [place] at [facility]. Please see a copy of my visit note below.    Alison Chandler is a 56 year old female who is being evaluated via a billable video visit.      The patient has been notified of following:     \"This video visit will be conducted via a call between you and your physician/provider. We have found that certain health care needs can be provided without the need for an in-person physical exam.  This service lets us provide the care you need with a video conversation.  If a prescription is necessary we can send it directly to your pharmacy.  If lab work is needed we can place an order for that and you can then stop by our lab to have the test done at a later time.    Video visits are billed at different rates depending on your insurance coverage.  Please reach out to your insurance provider with any questions.    If during the course of the call the physician/provider feels a video visit is not appropriate, you will not be charged for this service.\"    Patient has given verbal consent for Video visit? Yes  How would you like to obtain your AVS? MyChart  If you are dropped from the video visit, the video invite should be resent to: Send to e-mail at: [email or web address]  Will anyone else be joining your video visit? No  {If patient encounters technical issues they should call [phone]      Video-Visit Details    Type of service: Video Visit    Video Start Time: 1:53 PM  Video End Time: 2:12 PM    Originating Location (pt. Location): Home    Distant Location (provider location):  [facility] WEIGHT [facility]NA[facility]EMENT [facility]     Platform used for Video Visit: Astonish Results    During this virtual visit the patient " "is located in [place], patient verifies this as the location during the entirety of this visit.     Return Weight Management Nutrition Consultation    Alison Chandler is a 56 year old female presents today for return weight management nutrition consultation.  Patient referred by Dr. Robledo on October 14, 2021.    Patient with Co-morbidities of obesity including:  Type II DM no  Renal Failure no  Sleep apnea no  Hypertension no   Dyslipidemia no  Joint pain no  Back pain no  GERD no     Anthropometrics:  Initial weight (10/15/21): 257 lbs  Estimated body mass index is 35.84 kg/m  as calculated from the following:    Height as of 10/15/21: 1.803 m (5' 11\").    Weight as of 10/15/21: 116.6 kg (257 lb).     Current weight (12/15/2021): 244 lbs    Medications for Weight Loss:  Qsymia- not taking    NUTRITION HISTORY  See MD note for details.    Pt reports that she lost 60 lbs in 2011 but since then has had many life changes (empty evens, back to work) that have caused an acute increase in stress. She states she is trying to focus on mindfulness and stress reduction and is also taking anxiety medication. Pt states her main challenge is stress eating during the work day.     Pt states that tracking with MyFitnessPal and planning meals in advance has worked best for her in the past.    Pt has tried Whole 30 and now does not eat corn syrup, bread, pasta, quinoa, or dairy.  Pt reports that she never has refined grains     Eliminated soda from diet completely     11/17/21: Focusing on lean proteins, prepping vegetables for work snacks. More conscious during the weekends, drinking water. Was walking when it was warmer but has been getting at least 10,000 steps during the weekend. Not taking Qysmia.   Feeling less stressed at work and has been able to manage emotional eating.     12/15/2021: Pt states she did well at Thanksgiving but binged on cookies. Frustrated with plateau but hasn't been tracking the past month. Stressed " d/t issues with her home and has been focusing more on stress management than diet. Still making healthy choices but not top priority. Focusing on vegetables and not snacking as much in the afternoon. Still struggles with emotional eating.    Recent food recall:  Breakfast: 3 eggs, sweet potato, avocado, fruit   Lunch: [facility]- garden salad; office- out to lunch with coworker  Dinner:  box. Picks two healthy recipes at the beginning of the week and have leftovers, bags of steamed veggies  Snacks: popcorn, m&ms  Beverages: coffee  Alcohol: none  Dining out: 2-3 times per weekend, jordyn weaver [employer]    Progress on Previous Goals:  1) Continue using food journal not met  2) Continue meditation before work met, continues  3) Continue planning meals and snacks not met  4) Pre-plan your weekly meals before weeks you know will be overwhelming not met  5) Try freezing spinach to keep it fresh for longer: https://TeamRock/how-to-freeze-spinach/ met, continues  6) Increase activity as able improving, continues  7) Try low/non calorie creamers for coffee: met, continues   -Oatmilk Caramel Macchiato creamer (1 g sugar) https://[employer]/products/elmhurst-1925-caramel-macchiato-oat-creamer?kknpair=[id number]&zxwhgrg=[id number]&utm_source=rachael&utm_medium=cpc&utm_campaign=Standard_Shopping-Campaign&utm_term=4623871365138310&utm_content=Single%20Product%20-%20Top%20Performers   -Fairlife Creamers (3 g sugar)    Physical Activity:  Little during the week.   Fitbit  Weekends- working in the garden, hiking, 27776 steps  Has tried exercise apps in the past    Nutrition Prescription  Recommended energy/nutrient modification.    Nutrition Diagnosis  Obesity r/t long history of self-monitoring deficit and excessive energy intake aeb BMI >30.    Nutrition Intervention  Materials/education provided on Volumetric eating to help satiety level on fewer calories; portion control and  healthy food choices (Plate Method and Volumetrics handouts), 100 calorie snack choices, meal and snack planning and websites, sample meal plans     Patient demonstrates understanding.    Expected Engagement: good     Nutrition Goals  1) Continue utilizing food journal  2) Pre Plan meals for the week  3) Have healthy snacks available for work and home  4) Increase physical activity   -schedule a time for exercise throughout the week     The Plate Method  [email or web address]    Protein Sources   [email or web address]     Carbohydrates  [email or web address]     Mindful Eating  [email or web address]     Summary of [employer] Eating Plan  [email or web address]     Follow-Up:  1 month, PRN    Time spent with patient: 19 minutes.  MATTHEW JOHNSON RD, LD

## 2021-12-15 NOTE — PATIENT INSTRUCTIONS
Hi Makayla!    Follow-up with RD in 1 month    Thank you,    Betty Hopkins, JANNETH, LD  If you would like to schedule or reschedule an appointment with the RD, please call [phone]    Nutrition Goals  1) Continue utilizing food journal  2) Pre Plan meals for the week  3) Have healthy snacks available for work and home  4) Increase physical activity   -schedule a time for exercise throughout the week     The Plate Method  [email or web address]://[email or web address]    Protein Sources   [email or web address]     Carbohydrates  [email or web address]     Mindful Eating  [email or web address]     Summary of Volumetrics Eating Plan  [email or web address]         Interested in working with a health ? Health coaches work with you to improve your overall health and wellbeing. They look at the whole person, and may involve discussion of different areas of life, including, but not limited to the four pillars of health (sleep, exercise, nutrition, and stress management). Discuss with your care team if you would like to start working a health .    Health Coaching-3 Pack:    $99 for three health coaching visits    Visits may be done in person or via phone    Coaching is a partnership between the  and the client; Coaches do not prescribe or diagnose    Coaching helps inspire the client to reach his/her personal goals    COMPREHENSIVE WEIGHT MANAGEMENT PROGRAM  VIRTUAL SUPPORT GROUPS    For Support Group Information:    We offer support groups for patients who are working on weight loss and considering, preparing for or have had weight loss surgery. There is no cost for this opportunity.  You are invited to attend the?Virtual Support Groups?provided by any of the following locations:    [facility] via Microsoft Teams with Bridget Lira RN  2. [place] via [facility] with Evans Toledo, PhD, LP  3. [place] via [facility] with Apoorva Dai RN  4. [facility] via Microsoft Teams  "with Apoorva Vargas [facility]    The following Support Group information can also be found on our website:  [email or web address]    [facility] Weight Loss Surgery Support Group    [facility] Weight Loss Surgery Support Group  The support group is a patient-lead forum that meets monthly to share experiences, encouragement and education. It is open to those who have had weight loss surgery, are scheduled for surgery, and those who are considering surgery.  WHEN: This group meets on the 3rd Wednesday of each month from 5:00PM - 6:00PM virtually using Microsoft Teams.  FACILITATOR: Led by Bridget Lira, the program's Clinical Coordinator.  TO REGISTER: Please contact the clinic via myLINGO or call the nurse line directly at [phone] to inform our staff that you would like an invite sent to you and to let us know the email you would like the invite sent to. Prior to the meeting, a link with directions on how to join the meeting will be sent to you.    2021 Meetings  August 18: \"Let's Talk\" a time for the group to share.  September 15: \"Let's Talk\" a time for the group to share.  October 20: \"Let's Talk\" a time for the group to share.  November 17: Zeenat Chinchilla RD, DEON \"Protein, Metabolism and Meal Planning\"  December 15: Kartik Mckinnon RD, LD will speak, \"Recipe Modification\"    [facility] Support Groups    Connections: Bariatric Care Support Group?  This is open to all [facility] (and those external to this program) pre- and post- operative bariatric surgery patients as well as their support system.  WHEN: This group meets the 2nd Tuesday of each month from 6:30 PM - 8:00 PM virtually using Microsoft Teams.  FACILITATOR: Led by Evans Toledo, Ph.D who is a Licensed Psychologist with the [facility] Comprehensive Weight Management Program.  TO REGISTER: Please send an email to Evans" "Tre, Ph.D., LP at?[email or web address]?if you would like an invitation to the group and to learn about using Microsoft Teams.    2021 Meetings  August 10: Open Forum  September 14: Guest Speaker: Apoorva Dai RN,CBN, CIC, [facility] Comprehensive Weight Management Program, \"Your Hospital Stay and Post-Operative Compliance\"  October 12: Open Forum  November 9: Guest Speaker: Kathy Hanson RD,LD, [facility] Comprehensive Weight Management Program,\"Holiday Eating\".  December 14: Guest Speaker Yari Levi MD, MPH, [facility], [facility], \"Body Contouring Surgery for Bariatric Surgery Patients\"    Connections: Post-Operative Bariatric Surgery Support Group  This is a support group for [facility] bariatric patients (and those external to [facility]) who have had bariatric surgery and are at least 3 months post-surgery.  WHEN: This support group meets the 4th Wednesday of the month from 11:00 AM - 12:00 PM virtually using Microsoft Teams.  FACILITATOR: Led by Certified Bariatric Nurse, Aporova Dai RN.  TO REGISTER: Please send an email to Apoorva at [email or web address] if you would like an invitation to the group and to learn about using [employer].    [facility] Healthy Lifestyle Virtual Support Group    Healthy Lifestyle Virtual Support Group?  This is 60 minutes of small group guided discussion, support and resources. All are welcome who want a healthy lifestyle.  WHEN: This group meets monthly on a Friday from 12:30 PM - to 1:30 PM virtually using Microsoft Teams.  FACILITATOR: Led by National Board Certified Health , Apoorva Vargas [facility].  TO REGISTER: Please send an email to Apoorva at?[email or web address] to receive monthly invites to the group or if you have any questions about having a health . Prior to the meeting, a link with directions on how to join the meeting will be sent to you.    2021 " Meetings  August 27: Open Forum  September 24: Sleep and the 7 Types of Rest  October 29: Open Forum  November 19: Gratitude  December 10: Open Forum

## 2022-02-09 ENCOUNTER — ANCILLARY PROCEDURE (OUTPATIENT)
Dept: MAMMOGRAPHY | Facility: CLINIC | Age: 57
End: 2022-02-09
Attending: INTERNAL MEDICINE
Payer: COMMERCIAL

## 2022-02-09 DIAGNOSIS — Z12.31 VISIT FOR SCREENING MAMMOGRAM: ICD-10-CM

## 2022-02-09 PROCEDURE — 77067 SCR MAMMO BI INCL CAD: CPT | Mod: TC | Performed by: RADIOLOGY

## 2022-03-23 ENCOUNTER — TRANSFERRED RECORDS (OUTPATIENT)
Dept: MULTI SPECIALTY CLINIC | Facility: CLINIC | Age: 57
End: 2022-03-23
Payer: COMMERCIAL

## 2022-07-28 ENCOUNTER — DOCUMENTATION ONLY (OUTPATIENT)
Dept: LAB | Facility: CLINIC | Age: 57
End: 2022-07-28

## 2022-07-28 NOTE — PROGRESS NOTES
Hi,   The patient put in notes they are coming in for a shingles vaccine but has made a lab appointment as well on 08/01/2022. I am not sure if that was a mistake or not. Please place future lab orders if necessary. Thank you.     JOSE Wetzel

## 2022-08-01 NOTE — PROGRESS NOTES
pls check with patient. If she is wanting yearly lab done, could do blipr, comp. Otherwise could wait for next visit. Had iron studies last year but normal. If she doesn't have concerns about this, would wait for visit on this.  Nia Arshad M.D.

## 2022-08-02 NOTE — TELEPHONE ENCOUNTER
Called patient and spoke with her - it turns out that she thought she was supposed to go to the lab for her vaccine - so she made a lab appointment, ended up going to [facility] for vaccine instead, no need for lab appointment.     Shireen Hector, CMA

## 2022-08-03 ASSESSMENT — PATIENT HEALTH QUESTIONNAIRE - PHQ9
SUM OF ALL RESPONSES TO PHQ QUESTIONS 1-9: 0
SUM OF ALL RESPONSES TO PHQ QUESTIONS 1-9: 0

## 2022-08-10 ENCOUNTER — OFFICE VISIT (OUTPATIENT)
Dept: INTERNAL MEDICINE | Facility: CLINIC | Age: 57
End: 2022-08-10
Payer: COMMERCIAL

## 2022-08-10 VITALS
WEIGHT: 264 LBS | RESPIRATION RATE: 15 BRPM | OXYGEN SATURATION: 95 % | SYSTOLIC BLOOD PRESSURE: 116 MMHG | HEART RATE: 72 BPM | DIASTOLIC BLOOD PRESSURE: 80 MMHG | HEIGHT: 71 IN | TEMPERATURE: 97.7 F | BODY MASS INDEX: 36.96 KG/M2

## 2022-08-10 DIAGNOSIS — M79.662 PAIN OF LEFT LOWER LEG: Primary | ICD-10-CM

## 2022-08-10 PROCEDURE — 99214 OFFICE O/P EST MOD 30 MIN: CPT | Performed by: INTERNAL MEDICINE

## 2022-08-10 ASSESSMENT — PATIENT HEALTH QUESTIONNAIRE - PHQ9: SUM OF ALL RESPONSES TO PHQ QUESTIONS 1-9: 0

## 2022-08-18 ENCOUNTER — HOSPITAL ENCOUNTER (OUTPATIENT)
Dept: ULTRASOUND IMAGING | Facility: CLINIC | Age: 57
Discharge: HOME | End: 2022-08-18
Attending: INTERNAL MEDICINE | Admitting: INTERNAL MEDICINE
Payer: COMMERCIAL

## 2022-08-18 DIAGNOSIS — M79.662 PAIN OF LEFT LOWER LEG: ICD-10-CM

## 2022-08-18 PROCEDURE — 93971 EXTREMITY STUDY: CPT | Mod: LT

## 2022-09-11 ENCOUNTER — HEALTH MAINTENANCE LETTER (OUTPATIENT)
Age: 57
End: 2022-09-11

## 2022-09-29 ENCOUNTER — OFFICE VISIT (OUTPATIENT)
Dept: VASCULAR SURGERY | Facility: CLINIC | Age: 57
End: 2022-09-29
Attending: INTERNAL MEDICINE
Payer: COMMERCIAL

## 2022-09-29 DIAGNOSIS — M79.662 PAIN OF LEFT LOWER LEG: ICD-10-CM

## 2022-09-29 PROCEDURE — 99213 OFFICE O/P EST LOW 20 MIN: CPT | Performed by: SURGERY

## 2022-09-29 NOTE — NURSING NOTE
Patient Reported symptoms:    Left Leg   Heaviness Some of the time   Achiness Most of the time   Swelling Some of the time   Throbbing None of the time   Itching None of the time   Appearance Moderately noticeable   Impact on work/activities Symptoms but full able to participate

## 2022-09-29 NOTE — LETTER
9/29/2022         RE: Alison Chandler  [address] [facility] [address]        Dear Colleague,    Thank you for referring your patient, Alison Chandler, to the [facility]IN [facility]IN[facility] [place]. Please see a copy of my visit note below.    I had the pleasure of seeing Mrs. Alison Chandler in the vascular vein clinic today.  She is a 57-year-old female who I had seen previously for bilateral lower extremity varicose veins and spider veins.  Many years ago she underwent bilateral great saphenous vein ablation.    Of present complaint is that of feeling of swelling and pain behind the left knee.    She tells me that the pain is present when she wakes up in the morning.  It is exacerbated by walking.  Pain is constant and occasionally interferes with her ability to do daily daily and work-related activities.  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  She has tried intermittent leg elevation but that does not relieve her symptoms.  She also has pain at night and on occasion wakes her up from sleep.    On my examination she appears comfortable and she is in no acute distress.  He is pleasant and cooperative.  She has bilateral lower extremity scattered spider and varicose veins.  On my examination she also has tenderness behind the left knee.  No tenderness behind the right knee.    Diagnosis: Left lower extremity pain not due to varicose veins.    Plan: I explained to her that the fact she has pain when she wakes up in the morning, it is not helped by her leg elevation and pain that wakes her up at night while sleeping clinically does not fit the description of pain due to varicose veins.  Alternate etiologies should be sought.  My advice to her given the tenderness behind the left knee is to see advice from an orthopedic surgeon.  Her varicose veins and spider veins are actually not symptomatic and do not warrant any treatment.          Again, thank you for allowing me to  participate in the care of your patient.        Sincerely,        Phillip Antunez MD

## 2022-09-29 NOTE — PROGRESS NOTES
I had the pleasure of seeing Mrs. Alison Chandler in the vascular vein clinic today.  She is a 57-year-old female who I had seen previously for bilateral lower extremity varicose veins and spider veins.  Many years ago she underwent bilateral great saphenous vein ablation.    Of present complaint is that of feeling of swelling and pain behind the left knee.    She tells me that the pain is present when she wakes up in the morning.  It is exacerbated by walking.  Pain is constant and occasionally interferes with her ability to do daily daily and work-related activities.  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  She has tried intermittent leg elevation but that does not relieve her symptoms.  She also has pain at night and on occasion wakes her up from sleep.    On my examination she appears comfortable and she is in no acute distress.  He is pleasant and cooperative.  She has bilateral lower extremity scattered spider and varicose veins.  On my examination she also has tenderness behind the left knee.  No tenderness behind the right knee.    Diagnosis: Left lower extremity pain not due to varicose veins.    Plan: I explained to her that the fact she has pain when she wakes up in the morning, it is not helped by her leg elevation and pain that wakes her up at night while sleeping clinically does not fit the description of pain due to varicose veins.  Alternate etiologies should be sought.  My advice to her given the tenderness behind the left knee is to see advice from an orthopedic surgeon.  Her varicose veins and spider veins are actually not symptomatic and do not warrant any treatment.

## 2022-10-13 ENCOUNTER — TELEPHONE (OUTPATIENT)
Dept: PEDIATRICS | Facility: CLINIC | Age: 57
End: 2022-10-13

## 2022-10-13 NOTE — TELEPHONE ENCOUNTER
Message from Makayla:    Pt's insurance requires pt to stay within network.    [facility] not in network.    Will fax back to [facility] with this information.    Thank you  León CUMMINGS

## 2022-10-13 NOTE — TELEPHONE ENCOUNTER
Fax received from [facility]    Insurance referral request.    Start date for PT: 10/17/2022  # of Visits: 12  Diagnosis: M25.562 left knee pain    Fax to [phone]    Questions contact: [email or web address]

## 2022-10-25 ENCOUNTER — OFFICE VISIT (OUTPATIENT)
Dept: DERMATOLOGY | Facility: CLINIC | Age: 57
End: 2022-10-25
Payer: COMMERCIAL

## 2022-10-25 DIAGNOSIS — L81.4 LENTIGO: ICD-10-CM

## 2022-10-25 DIAGNOSIS — D22.9 NEVUS: Primary | ICD-10-CM

## 2022-10-25 DIAGNOSIS — L72.9 CYST OF SKIN: ICD-10-CM

## 2022-10-25 DIAGNOSIS — D18.01 ANGIOMA OF SKIN: ICD-10-CM

## 2022-10-25 DIAGNOSIS — Z86.006 HISTORY OF MELANOMA IN SITU: ICD-10-CM

## 2022-10-25 DIAGNOSIS — L82.1 SEBORRHEIC KERATOSIS: ICD-10-CM

## 2022-10-25 PROCEDURE — 99213 OFFICE O/P EST LOW 20 MIN: CPT | Performed by: PHYSICIAN ASSISTANT
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
## 2022-10-27 ENCOUNTER — TELEPHONE (OUTPATIENT)
Dept: PEDIATRICS | Facility: CLINIC | Age: 57
End: 2022-10-27

## 2022-10-27 DIAGNOSIS — M25.562 CHRONIC PAIN OF LEFT KNEE: Primary | ICD-10-CM

## 2022-10-27 DIAGNOSIS — G89.29 CHRONIC PAIN OF LEFT KNEE: Primary | ICD-10-CM

## 2022-10-27 NOTE — TELEPHONE ENCOUNTER
Message from patient  Topic: Non-Medical Question.     I need to speak to someone about getting a referral for some physical therapy I'm getting.  Best time to call is Tuesday 10/25 at any time.     Thanks, Makayla    Message from provider  Multiple routings. Please call her. I need to know what pain the referral is for, how long, which side, etc. Then can prep PT referral and I will sign.   Nia Arshad M.D.         Call placed to pt  LM to return call to the clinic    Thank you  Curry Rutherford RN on 10/27/2022 at 9:29 AM

## 2022-10-28 NOTE — TELEPHONE ENCOUNTER
Patient called back.     It is for posterior knee cap pain and swelling- left one, saw a provider at [facility] on 08/10/2022, had ultrasound on 08/18/2022, then saw vein clinic, ruled out DVT.    First experienced in July 2022 after strawberry picking, was waking her at night due to pain and was not getting better.   PT is calling it Mild Arthritis- doing PT exercises, patient reports it is improving after 2 visits so far, once weekly for 8-12 weeks.     Seeing [facility] therapists in [facility], started on 10/17/2022.     LYNDSEY Gresham on 10/28/2022 at 9:50 AM     none

## 2022-11-04 NOTE — TELEPHONE ENCOUNTER
Referral has been faxed to [facility]. Insurance Referral Request form asked for referral. Copy at [address] [facility].

## 2023-01-23 ENCOUNTER — HEALTH MAINTENANCE LETTER (OUTPATIENT)
Age: 58
End: 2023-01-23

## 2023-05-06 ENCOUNTER — HEALTH MAINTENANCE LETTER (OUTPATIENT)
Age: 58
End: 2023-05-06

## 2024-02-24 ENCOUNTER — HEALTH MAINTENANCE LETTER (OUTPATIENT)
Age: 59
End: 2024-02-24

## 2024-05-04 ENCOUNTER — HEALTH MAINTENANCE LETTER (OUTPATIENT)
Age: 59
End: 2024-05-04

## 2024-08-13 NOTE — LETTER
5/6/2020         RE: Alison Chandler  [address]  [facility] [address]        Dear Colleague,    Thank you for referring your patient, Alison Chandler, to the [facility]. Please see a copy of my visit note below.    HPI:  Alison Chandler is a 55 year old female patient here today for FBE hx of 0.7mm MM. Pt had a bx on left arm LOV benign lichenoid keratosis. States it never went away. Not bothersome .  Patient states this has been present for none.  Patient reports the following symptoms: none .  Patient reports the following previous treatments: none.  Patient reports the following modifying factors: none.  Associated symptoms: none.  Patient has no other skin complaints today.  Remainder of the HPI, Meds, PMH, Allergies, FH, and SH was reviewed in chart.    Pertinent Hx:   hx of 0.7mm melanoma on left posterior arm tx by mohs 8/6/19  Past Medical History:   Diagnosis Date     Malignant melanoma (H)        History reviewed. No pertinent surgical history.     Family History   Problem Relation Age of Onset     Diabetes Mother 50        type 2     Hypertension Mother      Hyperlipidemia Father      Melanoma No family hx of        Social History     Socioeconomic History     Marital status:      Spouse name: Not on file     Number of children: 1     Years of education: Not on file     Highest education level: Bachelor's degree (e.g., BA, AB, BS)   Occupational History     Occupation:      Employer: OTHER     Comment: [employer]   Social Needs     Financial resource strain: Not hard at all     Food insecurity     Worry: Never true     Inability: Never true     Transportation needs     Medical: No     Non-medical: No   Tobacco Use     Smoking status: Never Smoker     Smokeless tobacco: Never Used   Substance and Sexual Activity     Alcohol use: Yes     Frequency: 2-4 times a month     Drinks per session: 1 or 2     Binge  Render Risk Assessment In Note?: no Comment: 2 week hx of clear drainage from umbilicus.  She felt a crust when washing and then picked the crust.  It has been draining slightly serosanguinous since then.  She did start a new soap.  No evidence of lesion or cyst on examination.  Consider staph/trauma from washing. frequency: Never     Drug use: No     Sexual activity: Yes   Lifestyle     Physical activity     Days per week: 1 day     Minutes per session: 30 min     Stress: Only a little   Relationships     Social connections     Talks on phone: More than three times a week     Gets together: Twice a week     Attends Voodoo service: Never     Active member of club or organization: No     Attends meetings of clubs or organizations: Patient refused     Relationship status:      Intimate partner violence     Fear of current or ex partner: Not on file     Emotionally abused: Not on file     Physically abused: Not on file     Forced sexual activity: Not on file   Other Topics Concern     Parent/sibling w/ CABG, MI or angioplasty before 65F 55M? Not Asked   Social History Narrative     Not on file       Outpatient Encounter Medications as of 5/6/2020   Medication Sig Dispense Refill     FLUoxetine (PROZAC) 40 MG capsule Take 1 capsule (40 mg) by mouth daily 90 capsule 3     Multiple Vitamins-Minerals (MULTIVITAMIN ADULT PO) Take by mouth daily       Omega-3 Fatty Acids (FISH OIL) 1200 MG CAPS Take 1 capsule by mouth daily       UNABLE TO FIND MEDICATION NAME: calcium 1000 mg daily        Vitamin D, Cholecalciferol, 1000 UNITS CAPS Take 1 tablet by mouth 2 times daily       FLUoxetine (PROZAC) 20 MG capsule Take 1 capsule (20 mg) by mouth daily (Patient not taking: Reported on 7/31/2019) 90 capsule 1     No facility-administered encounter medications on file as of 5/6/2020.        Review Of Systems:  Skin: spot on left arm  Eyes: negative  Ears/Nose/Throat: negative  Respiratory: No shortness of breath, dyspnea on exertion, cough, or hemoptysis  Cardiovascular: negative  Gastrointestinal: negative  Genitourinary: negative  Musculoskeletal: negative  Neurologic: negative  Psychiatric: negative  Hematologic/Lymphatic/Immunologic: negative  Endocrine: negative      Objective:     /83   Pulse 51   LMP 01/18/2018   SpO2  98%   Breastfeeding No   Eyes: Conjunctivae/lids: Normal   ENT: Lips:  Normal  MSK: Normal  Cardiovascular: Peripheral edema none  Pulm: Breathing Normal  Neuro/Psych: Orientation: A/O x 3. Normal; Mood/Affect: Normal, NAD, WDWN  Pt accompanied by: self  Following areas examined: Scalp, face, eyelids, lips, neck, chest, abdomen, back, buttocks, and R&L upper and lower extremities. Pt defers exam of buttock, hips, groin and genitals.   Nodes: left axilla, left antecubital NLAD  Lepe skin type:i   Findings:  Red smooth well-defined macules on trunk and extremities.  Brown, stuck-on scaly appearing papules on trunk and extremities.  Well circumscribed macules with symmetric color distribution on trunk and extremities.  Tan WD smooth macules on face, neck, trunk, and extremities.  Smooth pink/purple linear patch on left posterior arm  Brown/purples scaly macule on left lateral distal arm    Assessment and Plan:     1) Cherry angiomas, Seborrheic keratoses, Benign nevi, Lentigines     I discussed the specifics of tumor, prognosis, and genetics of benign lesions.  I explained that treatment of these lesions would be purely cosmetic and not medically neccessary.  I discussed with patient different removal options including excision, cryotherapy, cautery and /or laser.  Lesion may recur and/or may not completely resolve. May need additional treatment.     2) nevus and keratosis  reviewed pts path  A. Shave, right medial heel:   - Lentiginous junctional melanocytic nevus - (see description)     B. Shave, left lateral distal arm:   Benign lichenoid keratosis  3) hx of 0.7mm melanoma on left posterior arm tx by mohs 8/6/19  Signs and Symptoms of non-melanoma skin cancer and ABCDEs of melanoma reviewed with patient. Patient encouraged to perform monthly self skin exams and educated on how to perform them. UV precautions reviewed with patient. Patient was asked about new or changing moles/lesions on body.   Wear a  Detail Level: Simple sunscreen with at least SPF 30 on your face, ears, neck and V of the chest daily. Wear sunscreen on other areas of the body if those areas are exposed to the sun throughout the day. Sunscreens can contain physical and/or chemical blockers. Physical blockers are less likely to clog pores, these include zinc oxide and titanium dioxide. Reapply every two hour and after swimming. Sunscreen examples include Neutrogena, CeraVe, Blue Lizard, Elta MD and many others.    Proper skin care from [facility]:    -Eliminate harsh soaps as they strip the natural oils from the skin, often resulting in dry itchy skin ( i.e. Dial, Zest, Kinyarwanda Spring)  -Use mild soaps such as Cetaphil or Dove Sensitive Skin in the shower. You do not need to use soap on arms, legs, and trunk every time you shower unless visibly soiled.   -Avoid hot or cold showers.  -After showering, lightly dry off and apply moisturizing within 2-3 minutes. This will help trap moisture in the skin.   -Aggressive use of a moisturizer at least 1-2 times a day to the entire body (including -Vanicream, Cetaphil, Aquaphor or Cerave) and moisturize hands after every washing.  -We recommend using moisturizers that come in a tub that needs to be scooped out, not a pump. This has more of an oil base. It will hold moisture in your skin much better than a water base moisturizer. The above recommended are non-pore clogging.               Follow up in 4 months for FBE.      Again, thank you for allowing me to participate in the care of your patient.        Sincerely,        Cate Gregg PA-C

## 2025-03-09 ENCOUNTER — HEALTH MAINTENANCE LETTER (OUTPATIENT)
Age: 60
End: 2025-03-09